# Patient Record
Sex: FEMALE | Race: WHITE | NOT HISPANIC OR LATINO | Employment: OTHER | URBAN - NONMETROPOLITAN AREA
[De-identification: names, ages, dates, MRNs, and addresses within clinical notes are randomized per-mention and may not be internally consistent; named-entity substitution may affect disease eponyms.]

---

## 2017-03-03 ENCOUNTER — OFFICE VISIT (OUTPATIENT)
Dept: MEDICAL GROUP | Facility: PHYSICIAN GROUP | Age: 67
End: 2017-03-03
Payer: MEDICARE

## 2017-03-03 VITALS
BODY MASS INDEX: 28 KG/M2 | OXYGEN SATURATION: 95 % | DIASTOLIC BLOOD PRESSURE: 84 MMHG | TEMPERATURE: 97.3 F | WEIGHT: 158 LBS | HEIGHT: 63 IN | SYSTOLIC BLOOD PRESSURE: 110 MMHG | RESPIRATION RATE: 16 BRPM | HEART RATE: 85 BPM

## 2017-03-03 DIAGNOSIS — R10.13 EPIGASTRIC PRESSURE: ICD-10-CM

## 2017-03-03 DIAGNOSIS — E55.9 VITAMIN D DEFICIENCY DISEASE: ICD-10-CM

## 2017-03-03 DIAGNOSIS — N18.30 CHRONIC KIDNEY DISEASE, STAGE 3 (HCC): ICD-10-CM

## 2017-03-03 PROCEDURE — G8420 CALC BMI NORM PARAMETERS: HCPCS | Performed by: NURSE PRACTITIONER

## 2017-03-03 PROCEDURE — 1101F PT FALLS ASSESS-DOCD LE1/YR: CPT | Performed by: NURSE PRACTITIONER

## 2017-03-03 PROCEDURE — 3014F SCREEN MAMMO DOC REV: CPT | Performed by: NURSE PRACTITIONER

## 2017-03-03 PROCEDURE — 99214 OFFICE O/P EST MOD 30 MIN: CPT | Performed by: NURSE PRACTITIONER

## 2017-03-03 PROCEDURE — G8432 DEP SCR NOT DOC, RNG: HCPCS | Performed by: NURSE PRACTITIONER

## 2017-03-03 PROCEDURE — 3017F COLORECTAL CA SCREEN DOC REV: CPT | Performed by: NURSE PRACTITIONER

## 2017-03-03 PROCEDURE — 4040F PNEUMOC VAC/ADMIN/RCVD: CPT | Performed by: NURSE PRACTITIONER

## 2017-03-03 PROCEDURE — G8482 FLU IMMUNIZE ORDER/ADMIN: HCPCS | Performed by: NURSE PRACTITIONER

## 2017-03-03 PROCEDURE — 1036F TOBACCO NON-USER: CPT | Performed by: NURSE PRACTITIONER

## 2017-03-03 ASSESSMENT — PAIN SCALES - GENERAL: PAINLEVEL: 2=MINIMAL-SLIGHT

## 2017-03-03 NOTE — PROGRESS NOTES
[facility]  Primary Care Office Visit - Problem-Oriented        History:     Hermelinda Mosley is a 66 y.o. female who is here today to discuss Shortness of Breath and GI Problem      Epigastric pressure  This is a new problem has been ongoing for many weeks. Patient reports epigastric discomfort and pressure. She does have a history of Nuñez's esophagus, she is on Nexium 20 mg daily. Her last EGD was in June 2016 which did not show any dysplasia. This discomfort and pressure is exacerbated by large meals and sitting after meals and occasionally with taking deep breathes. She denies any sour taste in her mouth, burning in her throat, nausea, vomiting, diarrhea, melena, syncope. She specifically states that this is not chest pain. She does go to exercise classes during the week and denies chest pain with exertion, she does get winded, she does high intensity interval training. She had been nervous about her decreased bone density at her last visited so had decided to decrease her use of her PPI. Unfortunately, I think that the above symptoms are stemming from uncontrolled Nuñez's esophagus/gastritis, recommend she increase Nexium to 20 mg twice daily or 2 tablets in the morning. We discussed EKG, patient declines today. She was instructed to follow-up with her gastroenterologist is symptoms persist.              Past Medical History   Diagnosis Date   • Hernia of unspecified site of abdominal cavity without mention of obstruction or gangrene repaired   • Vein, varicose stripped   • Screening mammogram 10/30/2008=normal   • Pap smear due   • History of colonoscopy 2005=normal     [facility]   • Unspecified hemorrhoids without mention of complication    • Degeneration of lumbar or lumbosacral intervertebral disc    • Abdominal pain, unspecified site    • Diarrhea    • Recurrent UTI 9/4/2009   • Swelling 9/4/2009   • Irritable bowel syndrome    • Esophageal reflux      Past Surgical History    Procedure Laterality Date   • Cholecystectomy     • Hernia repair     • Vein stripping       Social History     Social History   • Marital Status:      Spouse Name: N/A   • Number of Children: N/A   • Years of Education: N/A     Occupational History   • Not on file.     Social History Main Topics   • Smoking status: Former Smoker     Types: Cigarettes   • Smokeless tobacco: Not on file      Comment: only smokes 1-2 cigs a couple times a week for 1-2 years   • Alcohol Use: 0.0 oz/week     0 Standard drinks or equivalent per week      Comment: occasional   • Drug Use: No   • Sexual Activity: Not on file      Comment:      Other Topics Concern   • Not on file     Social History Narrative     History   Smoking status   • Former Smoker   • Types: Cigarettes   Smokeless tobacco   • Not on file     Comment: only smokes 1-2 cigs a couple times a week for 1-2 years     Family History   Problem Relation Age of Onset   • Hypertension Mother    • Diabetes Mother      pre diabetes   • Diabetes Father    • Hypertension Father      No Known Allergies    Problem List:     Patient Active Problem List    Diagnosis Date Noted   • Epigastric pressure 03/03/2017   • Nuñez's esophagus without dysplasia 10/17/2016   • Varicose veins of legs 10/17/2016   • Left ankle sprain 04/05/2016   • Primary osteoarthritis involving multiple joints 04/05/2016   • Chronic kidney disease, stage 3 04/05/2016   • Factor V Leiden mutation (CMS-[facility]) 04/05/2016   • Left ankle instability 01/05/2016   • Vitamin D deficiency disease 01/05/2016   • Cervical disc disease 01/05/2016   • Osteopenia 09/10/2013   • Swelling  ankles 09/04/2009   • Hemorrhoids 07/31/2009   • Esophageal reflux 07/31/2009   • Degeneration of lumbar or lumbosacral intervertebral disc 07/31/2009   • Irritable bowel syndrome 07/31/2009         Medications:     Current outpatient prescriptions:   •  esomeprazole (NEXIUM) 20 MG capsule, Take 1 Cap by mouth every morning  "before breakfast., Disp: 90 Each, Rfl: 3  •  omeprazole (PRILOSEC) 20 MG delayed-release capsule, Take 20 mg by mouth every day., Disp: , Rfl:   •  Diclofenac Sodium 1 % Gel, Apply 2gm in a thin layer to joint pain in hands up to 4 times per day if needed, Disp: 1 Tube, Rfl: 3  •  pneumococcal 13-Karina Conj Vacc (PREVNAR 13) syringe, 0.5 mL by Intramuscular route Once PRN for up to 1 dose., Disp: 0.5 Each, Rfl: 0  •  cyclobenzaprine (FLEXERIL) 5 MG tablet, Take 1-2 Tabs by mouth 3 times a day as needed for Mild Pain., Disp: 30 Tab, Rfl: 0  •  naproxen (NAPROSYN) 500 MG TABS, Take 1 Tab by mouth 2 times a day, with meals., Disp: 60 Tab, Rfl: 0  •  cyclobenzaprine (FLEXERIL) 10 MG TABS, Take 1 Tab by mouth at bedtime as needed for Mild Pain., Disp: 30 Tab, Rfl: 0  •  hydrocodone-acetaminophen (NORCO) 5-325 MG TABS per tablet, Take 1 Tab by mouth every 6 hours as needed., Disp: 20 Tab, Rfl: 0  •  hydrocortisone (ANUSOL-HC) 25 MG SUPP, Insert 1 Suppository in rectum every 12 hours., Disp: 60 Suppository, Rfl: 3  •  hydrocortisone rectal (PROCTOZONE HC) 2.5 % CREA, Apply to hemorrhoids twice per day, Disp: 30 g, Rfl: 0  •  Naproxen Sodium (ALEVE PO), Take  by mouth., Disp: , Rfl:   •  tramadol (ULTRAM) 50 MG TABS, Take 1 Tab by mouth every 8 hours as needed (moderate to severe pain)., Disp: 60 Tab, Rfl: 0  •  PROTONIX 40 MG TBEC, Take 40 mg by mouth 2 Times a Day., Disp: , Rfl:   •  ANUSOL-HC 25 MG SUPP, Insert 25 mg in rectum every 12 hours., Disp: , Rfl:       Review of Systems:     (positives in bold), all other systems reviewed and WNL     PULM: dyspnea, wheezing, cough, sputum production, hemoptysis  GI: nausea, vomiting, abdominal pain, greasy stools, blood in stool, diarrhea, constipation      Physical Assessment:     VS: /84 mmHg  Pulse 85  Temp(Src) 36.3 °C (97.3 °F)  Resp 16  Ht 1.588 m (5' 2.52\")  Wt 71.668 kg (158 lb)  BMI 28.42 kg/m2  SpO2 95%    General: Well-developed, well-nourished, female   "   Head: PERRL, EOMI. Normocephalic. No facial asymmetry noted.  Cardiovasc:No JVD.  RRR, no MRG. No thrills or bruits. Pulses 2+ and symmetric at all distal extremities.  Pulmonary: Lungs clear bilaterally.  Normal respiratory effort. No wheeze or crackles.   Abdomen: Abdomen soft, NT, ND, NAB. Patient is able to point to the area of discomfort which is inferior to the lower sternal border.  No masses or organomegaly.  Neuro: Alert and oriented  Skin:No rashes noted. Skin warm, dry, intact     Psych: Dressed appropriately for the weather, pleasant and conversant.  Affect, mood & judgment appropriate.    Assessment/Plan:   Hermelinda was seen today for shortness of breath and gi problem.    Diagnoses and all orders for this visit:    Epigastric pressure, new, stable   - Clinically stable, reassurance provided, I do think that her symptoms are related to Nuñez's esophagus and uncontrolled gastritis, recommend she increase her Nexium to 40 mg daily. Recommend small frequent meals. Given patient's concern for vitamin D deficiency and chronic kidney disease related to chronic use of PPIs we will repeat her labs in 2-3 weeks. We discussed EKG, patient declines. She is to follow-up with her gastroenterologist if symptoms persist despite increasing dose of Nexium. ER precautions discussed.    Vitamin D deficiency disease, chronic, unclear control   -     VITAMIN D,25 HYDROXY; Future    Chronic kidney disease, stage 3, chronic, stable   -     COMP METABOLIC PANEL; Future      Patient is agreeable to the above plan and voiced understanding. All questions answered.     Please note that this dictation was created using voice recognition software. I have made every reasonable attempt to correct obvious errors, but I expect that there are errors of grammar and possibly content that I did not discover before finalizing the note.      NEGRITO Alvarez  3/3/2017, 9:26 AM

## 2017-03-03 NOTE — ASSESSMENT & PLAN NOTE
This is a new problem has been ongoing for many weeks. Patient reports epigastric discomfort and pressure. She does have a history of Nuñez's esophagus, she is on Nexium 20 mg daily. Her last EGD was in June 2016 which did not show any dysplasia. This discomfort and pressure is exacerbated by large meals and sitting after meals and occasionally with taking deep breathes. She denies any sour taste in her mouth, burning in her throat, nausea, vomiting, diarrhea, melena, syncope. She specifically states that this is not chest pain. She does go to exercise classes during the week and denies chest pain with exertion, she does get winded, she does high intensity interval training. She had been nervous about her decreased bone density at her last visited so had decided to decrease her use of her PPI. Unfortunately, I think that the above symptoms are stemming from uncontrolled Nuñez's esophagus/gastritis, recommend she increase Nexium to 20 mg twice daily or 2 tablets in the morning. We discussed EKG, patient declines today. She was instructed to follow-up with her gastroenterologist is symptoms persist.

## 2017-03-24 ENCOUNTER — HOSPITAL ENCOUNTER (OUTPATIENT)
Dept: LAB | Facility: MEDICAL CENTER | Age: 67
End: 2017-03-24
Attending: NURSE PRACTITIONER
Payer: MEDICARE

## 2017-03-24 DIAGNOSIS — E55.9 VITAMIN D DEFICIENCY DISEASE: ICD-10-CM

## 2017-03-24 DIAGNOSIS — N18.30 CHRONIC KIDNEY DISEASE, STAGE 3 (HCC): ICD-10-CM

## 2017-03-24 LAB
25(OH)D3 SERPL-MCNC: 37 NG/ML (ref 30–100)
ALBUMIN SERPL BCP-MCNC: 4.5 G/DL (ref 3.2–4.9)
ALBUMIN/GLOB SERPL: 1.8 G/DL
ALP SERPL-CCNC: 66 U/L (ref 30–99)
ALT SERPL-CCNC: 16 U/L (ref 2–50)
ANION GAP SERPL CALC-SCNC: 5 MMOL/L (ref 0–11.9)
AST SERPL-CCNC: 19 U/L (ref 12–45)
BILIRUB SERPL-MCNC: 0.6 MG/DL (ref 0.1–1.5)
BUN SERPL-MCNC: 14 MG/DL (ref 8–22)
CALCIUM SERPL-MCNC: 9.8 MG/DL (ref 8.5–10.5)
CHLORIDE SERPL-SCNC: 105 MMOL/L (ref 96–112)
CO2 SERPL-SCNC: 29 MMOL/L (ref 20–33)
CREAT SERPL-MCNC: 1.04 MG/DL (ref 0.5–1.4)
GLOBULIN SER CALC-MCNC: 2.5 G/DL (ref 1.9–3.5)
GLUCOSE SERPL-MCNC: 77 MG/DL (ref 65–99)
POTASSIUM SERPL-SCNC: 4.4 MMOL/L (ref 3.6–5.5)
PROT SERPL-MCNC: 7 G/DL (ref 6–8.2)
SODIUM SERPL-SCNC: 139 MMOL/L (ref 135–145)

## 2017-03-24 PROCEDURE — 36415 COLL VENOUS BLD VENIPUNCTURE: CPT

## 2017-03-24 PROCEDURE — 82306 VITAMIN D 25 HYDROXY: CPT

## 2017-03-24 PROCEDURE — 80053 COMPREHEN METABOLIC PANEL: CPT

## 2017-05-15 ENCOUNTER — APPOINTMENT (OUTPATIENT)
Dept: RADIOLOGY | Facility: IMAGING CENTER | Age: 67
End: 2017-05-15
Attending: PHYSICIAN ASSISTANT
Payer: MEDICARE

## 2017-05-15 ENCOUNTER — OFFICE VISIT (OUTPATIENT)
Dept: URGENT CARE | Facility: PHYSICIAN GROUP | Age: 67
End: 2017-05-15
Payer: MEDICARE

## 2017-05-15 VITALS
SYSTOLIC BLOOD PRESSURE: 130 MMHG | HEART RATE: 68 BPM | WEIGHT: 160 LBS | HEIGHT: 63 IN | DIASTOLIC BLOOD PRESSURE: 82 MMHG | RESPIRATION RATE: 18 BRPM | TEMPERATURE: 98.3 F | BODY MASS INDEX: 28.35 KG/M2 | OXYGEN SATURATION: 96 %

## 2017-05-15 DIAGNOSIS — M19.042 OSTEOARTHRITIS OF FINGER, LEFT: ICD-10-CM

## 2017-05-15 DIAGNOSIS — M79.645 FINGER PAIN, LEFT: ICD-10-CM

## 2017-05-15 PROCEDURE — 4040F PNEUMOC VAC/ADMIN/RCVD: CPT | Performed by: PHYSICIAN ASSISTANT

## 2017-05-15 PROCEDURE — 1036F TOBACCO NON-USER: CPT | Performed by: PHYSICIAN ASSISTANT

## 2017-05-15 PROCEDURE — G8432 DEP SCR NOT DOC, RNG: HCPCS | Performed by: PHYSICIAN ASSISTANT

## 2017-05-15 PROCEDURE — 1101F PT FALLS ASSESS-DOCD LE1/YR: CPT | Performed by: PHYSICIAN ASSISTANT

## 2017-05-15 PROCEDURE — 99214 OFFICE O/P EST MOD 30 MIN: CPT | Performed by: PHYSICIAN ASSISTANT

## 2017-05-15 PROCEDURE — 3014F SCREEN MAMMO DOC REV: CPT | Performed by: PHYSICIAN ASSISTANT

## 2017-05-15 PROCEDURE — 73130 X-RAY EXAM OF HAND: CPT | Mod: TC,LT | Performed by: PHYSICIAN ASSISTANT

## 2017-05-15 PROCEDURE — G8419 CALC BMI OUT NRM PARAM NOF/U: HCPCS | Performed by: PHYSICIAN ASSISTANT

## 2017-05-15 RX ORDER — MELOXICAM 15 MG/1
15 TABLET ORAL DAILY
Qty: 30 TAB | Refills: 0 | Status: SHIPPED | OUTPATIENT
Start: 2017-05-15 | End: 2018-08-23

## 2017-05-15 NOTE — PROGRESS NOTES
Chief Complaint   Patient presents with   • Finger Pain     4th digit on right hand , swelling, pain  x 6 days       HISTORY OF PRESENT ILLNESS: Patient is a 66 y.o. female who presents today because she has pain and swelling of her left fourth digit. This is been going on for about 45 days. Denies any trauma to the area but was pulling some weeds. Denies any significant redness, the pain is in the joint area. She denies any distal paresthesias. She has been soaking it in hot water and it has not been helping.    Patient Active Problem List    Diagnosis Date Noted   • Epigastric pressure 03/03/2017   • Nuñez's esophagus without dysplasia 10/17/2016   • Varicose veins of legs 10/17/2016   • Left ankle sprain 04/05/2016   • Primary osteoarthritis involving multiple joints 04/05/2016   • Chronic kidney disease, stage 3 04/05/2016   • Factor V Leiden mutation (CMS-[facility]) 04/05/2016   • Left ankle instability 01/05/2016   • Vitamin D deficiency disease 01/05/2016   • Cervical disc disease 01/05/2016   • Osteopenia 09/10/2013   • Swelling  ankles 09/04/2009   • Hemorrhoids 07/31/2009   • Esophageal reflux 07/31/2009   • Degeneration of lumbar or lumbosacral intervertebral disc 07/31/2009   • Irritable bowel syndrome 07/31/2009       Allergies:Review of patient's allergies indicates no known allergies.    Current Outpatient Prescriptions Ordered in [facility]   Medication Sig Dispense Refill   • meloxicam (MOBIC) 15 MG tablet Take 1 Tab by mouth every day. 30 Tab 0   • omeprazole (PRILOSEC) 20 MG delayed-release capsule Take 20 mg by mouth every day.     • Diclofenac Sodium 1 % Gel Apply 2gm in a thin layer to joint pain in hands up to 4 times per day if needed 1 Tube 3   • pneumococcal 13-Karina Conj Vacc (PREVNAR 13) syringe 0.5 mL by Intramuscular route Once PRN for up to 1 dose. 0.5 Each 0   • cyclobenzaprine (FLEXERIL) 5 MG tablet Take 1-2 Tabs by mouth 3 times a day as needed for Mild Pain. 30 Tab 0   • naproxen (NAPROSYN)  500 MG TABS Take 1 Tab by mouth 2 times a day, with meals. 60 Tab 0   • cyclobenzaprine (FLEXERIL) 10 MG TABS Take 1 Tab by mouth at bedtime as needed for Mild Pain. 30 Tab 0   • hydrocodone-acetaminophen (NORCO) 5-325 MG TABS per tablet Take 1 Tab by mouth every 6 hours as needed. 20 Tab 0   • hydrocortisone (ANUSOL-HC) 25 MG SUPP Insert 1 Suppository in rectum every 12 hours. 60 Suppository 3   • hydrocortisone rectal (PROCTOZONE HC) 2.5 % CREA Apply to hemorrhoids twice per day 30 g 0   • esomeprazole (NEXIUM) 20 MG capsule Take 1 Cap by mouth every morning before breakfast. 90 Each 3   • Naproxen Sodium (ALEVE PO) Take  by mouth.     • tramadol (ULTRAM) 50 MG TABS Take 1 Tab by mouth every 8 hours as needed (moderate to severe pain). 60 Tab 0   • PROTONIX 40 MG TBEC Take 40 mg by mouth 2 Times a Day.     • ANUSOL-HC 25 MG SUPP Insert 25 mg in rectum every 12 hours.       No current [facility]-ordered facility-administered medications on file.       Past Medical History   Diagnosis Date   • Hernia of unspecified site of abdominal cavity without mention of obstruction or gangrene repaired   • Vein, varicose stripped   • Screening mammogram 10/30/2008=normal   • Pap smear due   • History of colonoscopy 2005=normal     Dr. Benjamin> [facility]   • Unspecified hemorrhoids without mention of complication    • Degeneration of lumbar or lumbosacral intervertebral disc    • Abdominal pain, unspecified site    • Diarrhea    • Recurrent UTI 9/4/2009   • Swelling 9/4/2009   • Irritable bowel syndrome    • Esophageal reflux        Social History   Substance Use Topics   • Smoking status: Former Smoker     Types: Cigarettes   • Smokeless tobacco: Never Used      Comment: only smokes 1-2 cigs a couple times a week for 1-2 years   • Alcohol Use: 0.0 oz/week     0 Standard drinks or equivalent per week      Comment: occasional       Family Status   Relation Status Death Age   • Mother Alive      is essentially healthy in her  "90's   • Father       kidney failure   • Brother Alive      polycythemia     Family History   Problem Relation Age of Onset   • Hypertension Mother    • Diabetes Mother      pre diabetes   • Diabetes Father    • Hypertension Father        ROS:  Review of Systems   Constitutional: Negative for fever, chills, weight loss and malaise/fatigue.   HENT: Negative for ear pain, nosebleeds, congestion, sore throat and neck pain.    Eyes: Negative for blurred vision.   Respiratory: Negative for cough, sputum production, shortness of breath and wheezing.    Cardiovascular: Negative for chest pain, palpitations, orthopnea and leg swelling.   Gastrointestinal: Negative for heartburn, nausea, vomiting and abdominal pain.       Exam:  Blood pressure 130/82, pulse 68, temperature 36.8 °C (98.3 °F), resp. rate 18, height 1.6 m (5' 2.99\"), weight 72.576 kg (160 lb), SpO2 96 %.  General:  Well nourished, well developed female in NAD  Head:Normocephalic, atraumatic  Eyes: PERRLA, EOM within normal limits, no conjunctival injection, no scleral icterus, visual fields and acuity grossly intact.  Extremities: no clubbing, cyanosis, or edema. She has nonerythematousTender swelling around her fourth digit PIP joint, with reduced range of motion distally, good distal circulation and sensation.    Hand x-ray by radiology interpretation:  No radiographic evidence of acute traumatic bone injury. Degenerative type arthritic changes involving the first carpal-metacarpal joint, distal interphalangeal joint of the index finger, and interphalangeal joint of the thumb.    Please note that this dictation was created using voice recognition software. I have made every reasonable attempt to correct obvious errors, but I expect that there are errors of grammar and possibly content that I did not discover before finalizing the note.    Assessment/Plan:  1. Finger pain, left  DX-HAND 3+ LEFT   2. Osteoarthritis of finger, left  meloxicam (MOBIC) 15 " MG tablet       Followup with primary care in the next 7-10 days if not significantly improving, return to the urgent care or go to the emergency room sooner for any worsening of symptoms.

## 2017-05-15 NOTE — MR AVS SNAPSHOT
"        Hermelinda Mosley   5/15/2017 1:20 PM   Office Visit   MRN: [id number]    Department:  [facility]   Dept Phone:  [phone]    Description:  Female : 1950   Provider:  Santosh Link PA-C           Reason for Visit     Finger Pain 4th digit on right hand , swelling, pain  x 6 days      Allergies as of 5/15/2017     No Known Allergies      You were diagnosed with     Finger pain, left   [369866]       Osteoarthritis of finger, left   [[id number]]         Vital Signs     Blood Pressure Pulse Temperature Respirations Height Weight    130/82 mmHg 68 36.8 °C (98.3 °F) 18 1.6 m (5' 2.99\") 72.576 kg (160 lb)    Body Mass Index Oxygen Saturation Smoking Status             28.35 kg/m2 96% Former Smoker         Basic Information     Date Of Birth Sex Race Ethnicity Preferred Language    1950 Female White Non- English      Your appointments     May 24, 2017  8:50 AM   MA [id number] with RB MG 1   [facility] ([address])    [address] [place] [id number] [phone]           No deodorant, powder, perfume or lotion under the arm or breast area.            May 25, 2017  8:20 AM   Established Patient with SADIE Claudio   [facility]    [address] [place] [id number] [phone]           You will be receiving a confirmation call a few days before your appointment from our automated call confirmation system.              Problem List              ICD-10-CM Priority Class Noted - Resolved    Hemorrhoids K64.9   2009 - Present    Esophageal reflux K21.9   2009 - Present    Degeneration of lumbar or lumbosacral intervertebral disc M51.37   2009 - Present    Irritable bowel syndrome K58.9   2009 - Present    Swelling  ankles R60.9   2009 - Present    Osteopenia M85.80   9/10/2013 - Present    Left ankle instability M25.372   2016 - Present    Vitamin D deficiency disease E55.9   " 1/5/2016 - Present    Cervical disc disease M50.90   1/5/2016 - Present    Left ankle sprain S93.402A   4/5/2016 - Present    Primary osteoarthritis involving multiple joints M15.0   4/5/2016 - Present    Chronic kidney disease, stage 3 N18.3   4/5/2016 - Present    Factor V Leiden mutation (CMS-[facility]) D68.51   4/5/2016 - Present    Nuñez's esophagus without dysplasia K22.70   10/17/2016 - Present    Varicose veins of legs I83.93   10/17/2016 - Present    Epigastric pressure R10.13   3/3/2017 - Present      Health Maintenance        Date Due Completion Dates    IMM DTaP/Tdap/Td Vaccine (1 - Tdap) 6/16/1969 ---    IMM ZOSTER VACCINE 6/16/2010 ---    IMM PNEUMOCOCCAL 65+ (ADULT) LOW/MEDIUM RISK SERIES (2 of 2 - PPSV23) 1/6/2017 1/6/2016    MAMMOGRAM 1/29/2017 1/29/2016, 2/11/2013 (Done), 12/1/2009, 12/1/2009, 10/30/2008, 10/30/2008    Override on 2/11/2013: Done (Dr. Hansen; [place])    PAP SMEAR 1/6/2018 1/6/2015 (Done)    Override on 1/6/2015: Done    BONE DENSITY 1/29/2021 1/29/2016, 6/12/2013    COLONOSCOPY 6/14/2026 6/14/2016            Current Immunizations     13-VALENT PCV PREVNAR 1/6/2016    Influenza TIV (IM) 9/17/2016      Below and/or attached are the medications your provider expects you to take. Review all of your home medications and newly ordered medications with your provider and/or pharmacist. Follow medication instructions as directed by your provider and/or pharmacist. Please keep your medication list with you and share with your provider. Update the information when medications are discontinued, doses are changed, or new medications (including over-the-counter products) are added; and carry medication information at all times in the event of emergency situations     Allergies:  No Known Allergies          Medications  Valid as of: May 15, 2017 -  2:55 PM    Generic Name Brand Name Tablet Size Instructions for use    Cyclobenzaprine HCl (Tab) FLEXERIL 10 MG Take 1 Tab by mouth at bedtime as  needed for Mild Pain.        Cyclobenzaprine HCl (Tab) FLEXERIL 5 MG Take 1-2 Tabs by mouth 3 times a day as needed for Mild Pain.        Diclofenac Sodium (Gel) Diclofenac Sodium 1 % Apply 2gm in a thin layer to joint pain in hands up to 4 times per day if needed        Esomeprazole Magnesium (CAPSULE DELAYED RELEASE) NEXIUM 20 MG Take 1 Cap by mouth every morning before breakfast.        Hydrocodone-Acetaminophen (Tab) NORCO 5-325 MG Take 1 Tab by mouth every 6 hours as needed.        Hydrocortisone (Cream) PROCTOZONE HC 2.5 % Apply to hemorrhoids twice per day        Hydrocortisone Acetate (Suppos) ANUSOL-HC 25 MG Insert 25 mg in rectum every 12 hours.        Hydrocortisone Acetate (Suppos) ANUSOL-HC 25 MG Insert 1 Suppository in rectum every 12 hours.        Meloxicam (Tab) MOBIC 15 MG Take 1 Tab by mouth every day.        Naproxen (Tab) NAPROSYN 500 MG Take 1 Tab by mouth 2 times a day, with meals.        Naproxen Sodium   Take  by mouth.        Omeprazole (CAPSULE DELAYED RELEASE) PRILOSEC 20 MG Take 20 mg by mouth every day.        Pantoprazole Sodium (Tablet Delayed Response) PROTONIX 40 MG Take 40 mg by mouth 2 Times a Day.        Pneumococcal 13-Karina Conj Vacc (Suspension) PREVNAR 13  0.5 mL by Intramuscular route Once PRN for up to 1 dose.        TraMADol HCl (Tab) ULTRAM 50 MG Take 1 Tab by mouth every 8 hours as needed (moderate to severe pain).        .                 Medicines prescribed today were sent to:     [facility] [address] [place] [id number]    Phone: [phone] Fax: [phone]    Open 24 Hours?: No      Medication refill instructions:       If your prescription bottle indicates you have medication refills left, it is not necessary to call your provider’s office. Please contact your pharmacy and they will refill your medication.    If your prescription bottle indicates you do not have any refills left, you may request  refills at any time through one of the following ways: The online [employer] system (except Urgent Care), by calling your provider’s office, or by asking your pharmacy to contact your provider’s office with a refill request. Medication refills are processed only during regular business hours and may not be available until the next business day. Your provider may request additional information or to have a follow-up visit with you prior to refilling your medication.   *Please Note: Medication refills are assigned a new Rx number when refilled electronically. Your pharmacy may indicate that no refills were authorized even though a new prescription for the same medication is available at the pharmacy. Please request the medicine by name with the pharmacy before contacting your provider for a refill.        Your To Do List     Future Labs/Procedures Complete By Expires    DX-HAND 3+ LEFT  As directed 5/15/2018      Other Notes About Your Plan     GYN-Dr Jade Michaels Status: Patient Declined

## 2017-05-24 ENCOUNTER — APPOINTMENT (OUTPATIENT)
Dept: RADIOLOGY | Facility: MEDICAL CENTER | Age: 67
End: 2017-05-24
Attending: NURSE PRACTITIONER
Payer: MEDICARE

## 2017-05-25 ENCOUNTER — APPOINTMENT (OUTPATIENT)
Dept: RADIOLOGY | Facility: IMAGING CENTER | Age: 67
End: 2017-05-25
Attending: NURSE PRACTITIONER
Payer: MEDICARE

## 2017-05-25 ENCOUNTER — OFFICE VISIT (OUTPATIENT)
Dept: MEDICAL GROUP | Facility: PHYSICIAN GROUP | Age: 67
End: 2017-05-25
Payer: MEDICARE

## 2017-05-25 VITALS
SYSTOLIC BLOOD PRESSURE: 124 MMHG | BODY MASS INDEX: 29.44 KG/M2 | HEIGHT: 62 IN | HEART RATE: 80 BPM | DIASTOLIC BLOOD PRESSURE: 70 MMHG | WEIGHT: 160 LBS | OXYGEN SATURATION: 96 % | RESPIRATION RATE: 20 BRPM | TEMPERATURE: 97.7 F

## 2017-05-25 DIAGNOSIS — R06.02 SOB (SHORTNESS OF BREATH): ICD-10-CM

## 2017-05-25 DIAGNOSIS — R07.9 CHEST PAIN, UNSPECIFIED TYPE: ICD-10-CM

## 2017-05-25 DIAGNOSIS — R10.13 EPIGASTRIC PRESSURE: Primary | ICD-10-CM

## 2017-05-25 DIAGNOSIS — N18.30 CHRONIC KIDNEY DISEASE, STAGE 3 (HCC): ICD-10-CM

## 2017-05-25 PROCEDURE — 4040F PNEUMOC VAC/ADMIN/RCVD: CPT | Performed by: NURSE PRACTITIONER

## 2017-05-25 PROCEDURE — 3014F SCREEN MAMMO DOC REV: CPT | Performed by: NURSE PRACTITIONER

## 2017-05-25 PROCEDURE — G8432 DEP SCR NOT DOC, RNG: HCPCS | Performed by: NURSE PRACTITIONER

## 2017-05-25 PROCEDURE — 99214 OFFICE O/P EST MOD 30 MIN: CPT | Performed by: NURSE PRACTITIONER

## 2017-05-25 PROCEDURE — 1101F PT FALLS ASSESS-DOCD LE1/YR: CPT | Performed by: NURSE PRACTITIONER

## 2017-05-25 PROCEDURE — 71020 DX-CHEST-2 VIEWS: CPT | Mod: TC | Performed by: NURSE PRACTITIONER

## 2017-05-25 PROCEDURE — 1036F TOBACCO NON-USER: CPT | Performed by: NURSE PRACTITIONER

## 2017-05-25 PROCEDURE — G8419 CALC BMI OUT NRM PARAM NOF/U: HCPCS | Performed by: NURSE PRACTITIONER

## 2017-05-25 NOTE — ASSESSMENT & PLAN NOTE
"This is a 66-year-old female who is here for follow-up of ongoing epigastric pressure. These episodes have been intermittent and ongoing for nearly a month. She has a history of Nuñez's esophagus and hiatal hernia, she continues on Nexium 40 mg daily which has not relieved her symptoms. She has episodes of pressure and discomfort that happen with both rest and physical activity, occasionally with eating large meals. She does have worsening reflux with dairy products and spacing meals, she tells me that these episodes are distinctly different from her reflux symptoms. She describes these episodes as \"feeling like someone is sitting on my chest\" and \"feeling somewhat labored to breathe.\" She adamantly declines chest pain, diaphoresis, nausea/vomiting with physical exertion. She does do high intensity interval training and only occasionally is able to reproduce these episodes. The episodes relief on her own.     Denies \"crushing\" chest pain, LUZ, numbness/tingling of arm or jaw, N/V, diaphoresis, palpitations, syncope.    Does not smoke, use illicit drugs, does not report routine NSAIDs    Denies history of asthma    She does have a follow-up appointment with her gastroenterologist next week. She is quite anxious about these episodes and we did discuss a referral to cardiology, patient would like to move forward with this. I have got a baseline EKG which does show borderline ST elevation, otherwise snius rhythm, she is not actively having any chest pain symptoms, this was reviewed with Dr. Long. We will also obtain CXR.   "

## 2017-05-26 NOTE — PROGRESS NOTES
"[facility]  Primary Care Office Visit - Problem-Oriented        History:     Hermelinda Mosley is a 66 y.o. female who is here today to discuss Follow-Up      Epigastric pressure  This is a 66-year-old female who is here for follow-up of ongoing epigastric pressure. These episodes have been intermittent and ongoing for nearly a month. She has a history of Nuñez's esophagus and hiatal hernia, she continues on Nexium 40 mg daily which has not relieved her symptoms. She has episodes of pressure and discomfort that happen with both rest and physical activity, occasionally with eating large meals. She does have worsening reflux with dairy products and spacing meals, she tells me that these episodes are distinctly different from her reflux symptoms. She describes these episodes as \"feeling like someone is sitting on my chest\" and \"feeling somewhat labored to breathe.\" She adamantly declines chest pain, diaphoresis, nausea/vomiting with physical exertion. She does do high intensity interval training and only occasionally is able to reproduce these episodes. The episodes relief on her own.     Denies \"crushing\" chest pain, LUZ, numbness/tingling of arm or jaw, N/V, diaphoresis, palpitations, syncope.    Does not smoke, use illicit drugs, does not report routine NSAIDs    Denies history of asthma    She does have a follow-up appointment with her gastroenterologist next week. She is quite anxious about these episodes and we did discuss a referral to cardiology, patient would like to move forward with this. I have got a baseline EKG which does show borderline ST elevation, otherwise snius rhythm, she is not actively having any chest pain symptoms, this was reviewed with Dr. Long. We will also obtain CXR.           Past Medical History   Diagnosis Date   • Hernia of unspecified site of abdominal cavity without mention of obstruction or gangrene repaired   • Vein, varicose stripped   • Screening mammogram " 10/30/2008=normal   • Pap smear due   • History of colonoscopy 2005=normal     Dr. Benjamin> [facility]   • Unspecified hemorrhoids without mention of complication    • Degeneration of lumbar or lumbosacral intervertebral disc    • Abdominal pain, unspecified site    • Diarrhea    • Recurrent UTI 9/4/2009   • Swelling 9/4/2009   • Irritable bowel syndrome    • Esophageal reflux      Past Surgical History   Procedure Laterality Date   • Cholecystectomy     • Hernia repair     • Vein stripping       Social History     Social History   • Marital Status:      Spouse Name: N/A   • Number of Children: N/A   • Years of Education: N/A     Occupational History   • Not on file.     Social History Main Topics   • Smoking status: Former Smoker     Types: Cigarettes   • Smokeless tobacco: Never Used      Comment: only smokes 1-2 cigs a couple times a week for 1-2 years   • Alcohol Use: 0.0 oz/week     0 Standard drinks or equivalent per week      Comment: occasional   • Drug Use: No   • Sexual Activity: Not on file      Comment:      Other Topics Concern   • Not on file     Social History Narrative     History   Smoking status   • Former Smoker   • Types: Cigarettes   Smokeless tobacco   • Never Used     Comment: only smokes 1-2 cigs a couple times a week for 1-2 years     Family History   Problem Relation Age of Onset   • Hypertension Mother    • Diabetes Mother      pre diabetes   • Diabetes Father    • Hypertension Father      No Known Allergies    Problem List:     Patient Active Problem List    Diagnosis Date Noted   • Epigastric pressure 03/03/2017   • Nuñez's esophagus without dysplasia 10/17/2016   • Varicose veins of legs 10/17/2016   • Left ankle sprain 04/05/2016   • Primary osteoarthritis involving multiple joints 04/05/2016   • Chronic kidney disease, stage 3 04/05/2016   • Factor V Leiden mutation (CMS-[facility]) 04/05/2016   • Left ankle instability 01/05/2016   • Vitamin D deficiency disease  01/05/2016   • Cervical disc disease 01/05/2016   • Osteopenia 09/10/2013   • Swelling  ankles 09/04/2009   • Hemorrhoids 07/31/2009   • Esophageal reflux 07/31/2009   • Degeneration of lumbar or lumbosacral intervertebral disc 07/31/2009   • Irritable bowel syndrome 07/31/2009         Medications:     Current outpatient prescriptions:   •  Multiple Vitamins-Minerals (MULTIVITAMIN PO), Take  by mouth., Disp: , Rfl:   •  Cholecalciferol (VITAMIN D PO), Take  by mouth., Disp: , Rfl:   •  Probiotic Product (PROBIOTIC DAILY PO), Take  by mouth., Disp: , Rfl:   •  Ascorbic Acid (VITAMIN C PO), Take  by mouth., Disp: , Rfl:   •  meloxicam (MOBIC) 15 MG tablet, Take 1 Tab by mouth every day., Disp: 30 Tab, Rfl: 0  •  omeprazole (PRILOSEC) 20 MG delayed-release capsule, Take 20 mg by mouth every day., Disp: , Rfl:   •  Naproxen Sodium (ALEVE PO), Take  by mouth., Disp: , Rfl:   •  Diclofenac Sodium 1 % Gel, Apply 2gm in a thin layer to joint pain in hands up to 4 times per day if needed, Disp: 1 Tube, Rfl: 3  •  pneumococcal 13-Karina Conj Vacc (PREVNAR 13) syringe, 0.5 mL by Intramuscular route Once PRN for up to 1 dose., Disp: 0.5 Each, Rfl: 0  •  cyclobenzaprine (FLEXERIL) 5 MG tablet, Take 1-2 Tabs by mouth 3 times a day as needed for Mild Pain., Disp: 30 Tab, Rfl: 0  •  naproxen (NAPROSYN) 500 MG TABS, Take 1 Tab by mouth 2 times a day, with meals., Disp: 60 Tab, Rfl: 0  •  cyclobenzaprine (FLEXERIL) 10 MG TABS, Take 1 Tab by mouth at bedtime as needed for Mild Pain., Disp: 30 Tab, Rfl: 0  •  hydrocodone-acetaminophen (NORCO) 5-325 MG TABS per tablet, Take 1 Tab by mouth every 6 hours as needed., Disp: 20 Tab, Rfl: 0  •  hydrocortisone (ANUSOL-HC) 25 MG SUPP, Insert 1 Suppository in rectum every 12 hours., Disp: 60 Suppository, Rfl: 3  •  hydrocortisone rectal (PROCTOZONE HC) 2.5 % CREA, Apply to hemorrhoids twice per day, Disp: 30 g, Rfl: 0  •  esomeprazole (NEXIUM) 20 MG capsule, Take 1 Cap by mouth every morning before  "breakfast., Disp: 90 Each, Rfl: 3  •  tramadol (ULTRAM) 50 MG TABS, Take 1 Tab by mouth every 8 hours as needed (moderate to severe pain)., Disp: 60 Tab, Rfl: 0  •  PROTONIX 40 MG TBEC, Take 40 mg by mouth 2 Times a Day., Disp: , Rfl:   •  ANUSOL-HC 25 MG SUPP, Insert 25 mg in rectum every 12 hours., Disp: , Rfl:       Review of Systems:     Positives per HPI all other systems reviewed and WNL       Physical Assessment:     VS: /70 mmHg  Pulse 80  Temp(Src) 36.5 °C (97.7 °F)  Resp 20  Ht 1.575 m (5' 2.01\")  Wt 72.576 kg (160 lb)  BMI 29.26 kg/m2  SpO2 96%    General: Well-developed, well-nourished female    Head: PERRL, EOMI. Normocephalic. No facial asymmetry noted.  Cardiovasc:No JVD.  RRR, no MRG. No thrills or bruits. Pulses 2+ and symmetric at all distal extremities. No reproducible symptoms with A/P chest pressure.   Pulmonary: Lungs clear bilaterally.  Normal respiratory effort. No wheeze or crackles.   Extremities: No edema, no TTP bilateral calves. Pedal pulses intact. No joint effusions. LEs warm and well-perfused.  Neuro: Alert and oriented, CNs II-XII intact, no focal deficits.  Skin:No rashes noted. Skin warm, dry, intact    Psych: Dressed appropriately for the weather, pleasant and conversant.  Affect, mood & judgment appropriate.    LABS: Results reviewed and discussed with the patient, all questions answered.        Assessment/Plan:   Hermelinda was seen today for follow-up.    Diagnoses and all orders for this visit:    Epigastric pressure, ongoing, stable   - clinically stable and asymptomatic in the office today, EKG obtained as baseline for referral to cardiology at patient's request. CXR ordered. She needs to keep her appointment with gastro next week for evaluation and possible EGD. Continue omeprazole 40mg daily. ER precautions discussed. Follow up in 1 month, sooner if needed     SOB (shortness of breath), ongoing, intermittent, stable   - treatment plan as above   -     DX-CHEST-2 " VIEWS; Future  -     REFERRAL TO CARDIOLOGY    Chest pain, unspecified type, ongoing, intermittent, stable   - treatment as above   -     REFERRAL TO CARDIOLOGY      Chronic kidney disease, stage 3, stable   - reviewed labs with patient, push fluids, continue to monitor     Patient is agreeable to the above plan and voiced understanding. All questions answered.     Please note that this dictation was created using voice recognition software. I have made every reasonable attempt to correct obvious errors, but I expect that there are errors of grammar and possibly content that I did not discover before finalizing the note.      NEGRITO Alvarez  5/25/2017, 5:01 PM

## 2017-05-30 ENCOUNTER — TELEPHONE (OUTPATIENT)
Dept: MEDICAL GROUP | Facility: PHYSICIAN GROUP | Age: 67
End: 2017-05-30

## 2017-05-30 NOTE — TELEPHONE ENCOUNTER
Received a call from the gastroenterologist today stating that  patient should be seen by cardiology first before GI. I see that cardiology clinic tried to schedule an appointment with her twice. Please let patient know she needs to call cardiology clinic back so that she can schedule an appointment with them.  Maribel Long M.D.

## 2017-05-31 ENCOUNTER — PATIENT OUTREACH (OUTPATIENT)
Dept: HEALTH INFORMATION MANAGEMENT | Facility: OTHER | Age: 67
End: 2017-05-31

## 2017-05-31 NOTE — PROGRESS NOTES
Attempt #:1    WebIZ Checked & Epic Updated: yes  HealthConnect Verified: no  Verify PCP: yes    Communication Preference Obtained: yes     Review Care Team: yes    Annual Wellness Visit Scheduling  1. Scheduling Status:Scheduled     Care Gap Scheduling (Attempt to Schedule EACH Overdue Care Gap!)     Health Maintenance Due   Topic Date Due   • Annual Wellness Visit  Scheduled    • MAMMOGRAM  Scheduled         Devotee Activation: already active  Devotee Sheba: no  Virtual Visits: no  Opt In to Text Messages: no

## 2017-06-01 ENCOUNTER — HOSPITAL ENCOUNTER (OUTPATIENT)
Dept: RADIOLOGY | Facility: MEDICAL CENTER | Age: 67
End: 2017-06-01
Attending: NURSE PRACTITIONER
Payer: MEDICARE

## 2017-06-01 DIAGNOSIS — Z13.9 SCREENING: ICD-10-CM

## 2017-06-01 PROCEDURE — 77063 BREAST TOMOSYNTHESIS BI: CPT

## 2017-06-26 ENCOUNTER — OFFICE VISIT (OUTPATIENT)
Dept: MEDICAL GROUP | Facility: PHYSICIAN GROUP | Age: 67
End: 2017-06-26
Payer: MEDICARE

## 2017-06-26 VITALS
TEMPERATURE: 99 F | DIASTOLIC BLOOD PRESSURE: 86 MMHG | BODY MASS INDEX: 29.44 KG/M2 | HEART RATE: 65 BPM | HEIGHT: 62 IN | SYSTOLIC BLOOD PRESSURE: 130 MMHG | OXYGEN SATURATION: 97 % | WEIGHT: 160 LBS

## 2017-06-26 DIAGNOSIS — M51.37 DEGENERATION OF LUMBAR OR LUMBOSACRAL INTERVERTEBRAL DISC: ICD-10-CM

## 2017-06-26 DIAGNOSIS — D68.51 FACTOR V LEIDEN MUTATION (HCC): ICD-10-CM

## 2017-06-26 DIAGNOSIS — I83.93 VARICOSE VEINS OF LEGS: ICD-10-CM

## 2017-06-26 DIAGNOSIS — R10.13 EPIGASTRIC PRESSURE: ICD-10-CM

## 2017-06-26 DIAGNOSIS — R60.9 SWELLING: ICD-10-CM

## 2017-06-26 DIAGNOSIS — M15.9 PRIMARY OSTEOARTHRITIS INVOLVING MULTIPLE JOINTS: ICD-10-CM

## 2017-06-26 DIAGNOSIS — M25.372 LEFT ANKLE INSTABILITY: ICD-10-CM

## 2017-06-26 DIAGNOSIS — M50.90 CERVICAL DISC DISEASE: ICD-10-CM

## 2017-06-26 DIAGNOSIS — Z00.00 MEDICARE ANNUAL WELLNESS VISIT, INITIAL: Primary | ICD-10-CM

## 2017-06-26 DIAGNOSIS — E55.9 VITAMIN D DEFICIENCY DISEASE: ICD-10-CM

## 2017-06-26 DIAGNOSIS — K22.70 BARRETT'S ESOPHAGUS WITHOUT DYSPLASIA: ICD-10-CM

## 2017-06-26 DIAGNOSIS — N18.30 CHRONIC KIDNEY DISEASE, STAGE 3 (HCC): ICD-10-CM

## 2017-06-26 PROCEDURE — G0438 PPPS, INITIAL VISIT: HCPCS | Performed by: NURSE PRACTITIONER

## 2017-06-26 ASSESSMENT — PATIENT HEALTH QUESTIONNAIRE - PHQ9: CLINICAL INTERPRETATION OF PHQ2 SCORE: 2

## 2017-06-26 NOTE — PATIENT INSTRUCTIONS
Continue with care through [facility].  Next Medicare Annual Wellness Visit is due in one year.    Continue care with specialists you are seeing for your chronic problems.    Attached is some preventative information for you to read.          Fall Prevention and Home Safety  Falls cause injuries and can affect all age groups. It is possible to prevent falls.   HOW TO PREVENT FALLS  · Wear shoes with rubber soles that do not have an opening for your toes.   · Keep the inside and outside of your house well lit.   · Use night lights throughout your home.   · Remove clutter from floors.   · Clean up floor spills.   · Remove throw rugs or fasten them to the floor with carpet tape.   · Do not place electrical cords across pathways.   · Put grab bars by your tub, shower, and toilet. Do not use towel bars as grab bars.   · Put handrails on both sides of the stairway. Fix loose handrails.   · Do not climb on stools or stepladders, if possible.   · Do not wax your floors.   · Repair uneven or unsafe sidewalks, walkways, or stairs.   · Keep items you use a lot within reach.   · Be aware of pets.   · Keep emergency numbers next to the telephone.   · Put smoke detectors in your home and near bedrooms.   Ask your doctor what other things you can do to prevent falls.  Document Released: 10/14/2010 Document Revised: 06/18/2013 Document Reviewed: 03/19/2013  ExitCare® Patient Information ©2013 [employer].    Exercise to Stay Healthy      Exercise helps you become and stay healthy.  EXERCISE IDEAS AND TIPS  Choose exercises that:  · You enjoy.   · Fit into your day.   You do not need to exercise really hard to be healthy. You can do exercises at a slow or medium level and stay healthy. You can:  · Stretch before and after working out.   · Try yoga, Pilates, or malik chi.   · Lift weights.   · Walk fast, swim, jog, run, climb stairs, bicycle, dance, or rollerskate.   · Take aerobic classes.   Exercises that burn about  150 calories:  · Running 1 ½ miles in 15 minutes.   · Playing volleyball for 45 to 60 minutes.   · Washing and waxing a car for 45 to 60 minutes.   · Playing touch football for 45 minutes.   · Walking 1 ¾ miles in 35 minutes.   · Pushing a stroller 1 ½ miles in 30 minutes.   · Playing basketball for 30 minutes.   · Raking leaves for 30 minutes.   · Bicycling 5 miles in 30 minutes.   · Walking 2 miles in 30 minutes.   · Dancing for 30 minutes.   · Shoveling snow for 15 minutes.   · Swimming laps for 20 minutes.   · Walking up stairs for 15 minutes.   · Bicycling 4 miles in 15 minutes.   · Gardening for 30 to 45 minutes.   · Jumping rope for 15 minutes.   · Washing windows or floors for 45 to 60 minutes.     One suggestion is to start walking for 10 minutes after each meal.  This will help with digestion and help you to metabolize your meal.       For Weight Loss -   Recommend portion sizes with more fruits/vegetables/high fiber foods.  Stay away from white bread/pastas/white rice/white potatoes.  Increase Fluid intake to 6-8 glasses of water daily.   Eliminate soda's (diet and regular) from your fluid intake.      Document Released: 01/20/2012 Document Revised: 03/11/2013 Document Reviewed: 01/20/2012  ExitCare® Patient Information ©2013 [employer].    Fat and Cholesterol Control Diet  Cholesterol is a wax-like substance. It comes from your liver and is found in certain foods. There is good (HDL) and bad (LDL) cholesterol. Too much cholesterol in your blood can affect your heart. Certain foods can lower or raise your cholesterol. Eat foods that are low in cholesterol.  Saturated and trans fats are bad fats found in foods that will raise your cholesterol. Do not eat foods that are high in saturated and trans fats.  FOODS HIGHER IN SATURATED AND TRANS FATS  · Dairy products, such as whole milk, eggs, cheese, cream, and butter.   · Fatty meats, such as hot dogs, sausage, and salami.   · Fried foods.   · Trans fats  which are found in margarine and pre-made cookies, crackers, and baked goods.   · Tropical oils, such as coconut and palm oils.   Read package labels at the store. Do not buy products that use saturated or trans fats or hydrogenated oils. Find foods labeled:  · Low-fat.   · Low-saturated fat.   · Trans-fat-free.   · Low-cholesterol.   FOODS LOWER IN CHOLESTEROL  ·  Fruit.   · Vegetables.   · Beans, peas, and lentils.   · Fish.   · Lean meat, such as chicken (without skin) or ground turkey.   · Grains, such as barley, rice, couscous, bulgur wheat, and pasta.   · Heart-healthy tub margarine.   PREPARING YOUR FOOD  · Broil, bake, steam, or roast foods. Do not moncada food.   · Use non-stick cooking sprays.   · Use lemon or herbs to flavor food instead of using butter or stick margarine.   · Use nonfat yogurt, salsa, or low-fat dressings for salads.   Document Released: 06/18/2013 Document Reviewed: 06/18/2013  ExitCare® Patient Information ©2013 [employer].    Recommend annual flu vaccine.  Next due in Fall, 2015.  If you decide not to have the flu vaccine, recommend good handwashing and staying out of crowds during flu season.

## 2017-06-26 NOTE — ASSESSMENT & PLAN NOTE
Chronic condition managed with current medical regimen  Stable per review, unchanged   Continue with current meds  Followed by [facility].

## 2017-06-26 NOTE — ASSESSMENT & PLAN NOTE
Chronic condition managed with current medical regimen  Stable per review, denies aching, burning or pain   Continue with current meds  Followed by [facility].

## 2017-06-26 NOTE — ASSESSMENT & PLAN NOTE
Chronic condition managed with current medical regimen  Stable per review with medication   Continue with current meds  Followed by GI.

## 2017-06-26 NOTE — ASSESSMENT & PLAN NOTE
Has been evaluated by GI, per pt GI not cause  Has an appt with cardiology for w/u, states has improved over the past few wks

## 2017-06-26 NOTE — ASSESSMENT & PLAN NOTE
Per pt states still has intermit episodes of instability with tenderness  States has not fallen   Followed by [facility].

## 2017-06-26 NOTE — ASSESSMENT & PLAN NOTE
Chronic condition managed with current medical regimen  Stable per review, endoscopy done 6/2016   Continue with current meds  Followed by [facility].

## 2017-06-26 NOTE — MR AVS SNAPSHOT
"        Hermelinda Mosley   2017 10:20 AM   Office Visit   MRN: [id number]    Department:  [facility]   Dept Phone:  [phone]    Description:  Female : 1950   Provider:  SADIE Neville           Reason for Visit     Annual Exam initial      Allergies as of 2017     No Known Allergies      You were diagnosed with     Medicare annual wellness visit, initial   [855853]  -  Primary     Nuñez's esophagus without dysplasia   [848542]       Cervical disc disease   [150066]       Chronic kidney disease, stage 3   [500302]       Degeneration of lumbar or lumbosacral intervertebral disc   [722.52.ICD-9-CM]       Epigastric pressure   [760335]       Factor V Leiden mutation (CMS-HCC)   [086871]       Left ankle instability   [074820]       Primary osteoarthritis involving multiple joints   [1637411]       Swelling   [025758]       Vitamin D deficiency disease   [214770]       Varicose veins of legs   [413198]         Vital Signs     Blood Pressure Pulse Temperature Height Weight Body Mass Index    130/86 mmHg 65 37.2 °C (99 °F) 1.575 m (5' 2.01\") 72.576 kg (160 lb) 29.26 kg/m2    Oxygen Saturation Smoking Status                97% Former Smoker          Basic Information     Date Of Birth Sex Race Ethnicity Preferred Language    1950 Female White Non- English      Your appointments     2017  9:40 AM   Established Patient with [facility] [address] [address] [phone]           You will be receiving a confirmation call a few days before your appointment from our automated call confirmation system.            Aug 18, 2017 12:20 PM   NEW PATIENT with Sarai Restrepo M.D.   [facility] (--)    [address] [place] [address] [phone]              Problem List              ICD-10-CM Priority Class Noted - Resolved   " Esophageal reflux K21.9   7/31/2009 - Present    Degeneration of lumbar or lumbosacral intervertebral disc M51.37   7/31/2009 - Present    Irritable bowel syndrome K58.9   7/31/2009 - Present    Osteopenia M85.80   9/10/2013 - Present    Left ankle instability M25.372   1/5/2016 - Present    Vitamin D deficiency disease E55.9   1/5/2016 - Present    Cervical disc disease M50.90   1/5/2016 - Present    Primary osteoarthritis involving multiple joints M15.0   4/5/2016 - Present    Chronic kidney disease, stage 3 N18.3   4/5/2016 - Present    Factor V Leiden mutation (CMS-[facility]) D68.51   4/5/2016 - Present    Nuñez's esophagus without dysplasia K22.70   10/17/2016 - Present    Varicose veins of legs I83.93   10/17/2016 - Present    Epigastric pressure R10.13   3/3/2017 - Present      Health Maintenance        Date Due Completion Dates    PAP SMEAR 1/6/2018 1/6/2015 (Done)    Override on 1/6/2015: Done    MAMMOGRAM 6/1/2018 6/1/2017, 1/29/2016, 2/11/2013 (Done), 12/1/2009, 12/1/2009, 10/30/2008, 10/30/2008    Override on 2/11/2013: Done (Dr. Hansen; [place])    BONE DENSITY 1/29/2021 1/29/2016, 6/12/2013    IMM DTaP/Tdap/Td Vaccine (2 - Td) 2/11/2024 2/11/2014    COLONOSCOPY 6/14/2026 6/14/2016            Current Immunizations     13-VALENT PCV PREVNAR 1/6/2016    Influenza TIV (IM) 9/17/2016    Influenza Vaccine Adult HD 9/13/2016    Pneumococcal polysaccharide vaccine (PPSV-23) 1/27/2017    SHINGLES VACCINE 2/11/2014    Tdap Vaccine 2/11/2014      Below and/or attached are the medications your provider expects you to take. Review all of your home medications and newly ordered medications with your provider and/or pharmacist. Follow medication instructions as directed by your provider and/or pharmacist. Please keep your medication list with you and share with your provider. Update the information when medications are discontinued, doses are changed, or new medications (including over-the-counter products) are  added; and carry medication information at all times in the event of emergency situations     Allergies:  No Known Allergies          Medications  Valid as of: June 26, 2017 - 11:53 AM    Generic Name Brand Name Tablet Size Instructions for use    Ascorbic Acid   Take  by mouth.        Cholecalciferol   Take  by mouth.        Diclofenac Sodium (Gel) Diclofenac Sodium 1 % Apply 2gm in a thin layer to joint pain in hands up to 4 times per day if needed        Meloxicam (Tab) MOBIC 15 MG Take 1 Tab by mouth every day.        Multiple Vitamins-Minerals   Take  by mouth.        Omeprazole (CAPSULE DELAYED RELEASE) PRILOSEC 20 MG Take 20 mg by mouth every day.        Probiotic Product   Take  by mouth.        TraMADol HCl (Tab) ULTRAM 50 MG Take 1 Tab by mouth every 8 hours as needed (moderate to severe pain).        .                 Medicines prescribed today were sent to:     [facility] [address] [place] [address], [place] - [address] [facility]    [address] [facility] [address]    Phone: [phone] Fax: [phone]    Open 24 Hours?: No      Medication refill instructions:       If your prescription bottle indicates you have medication refills left, it is not necessary to call your provider’s office. Please contact your pharmacy and they will refill your medication.    If your prescription bottle indicates you do not have any refills left, you may request refills at any time through one of the following ways: The online [employer] system (except Urgent Care), by calling your provider’s office, or by asking your pharmacy to contact your provider’s office with a refill request. Medication refills are processed only during regular business hours and may not be available until the next business day. Your provider may request additional information or to have a follow-up visit with you prior to refilling your medication.   *Please Note: Medication refills are assigned a new Rx number when refilled electronically. Your  pharmacy may indicate that no refills were authorized even though a new prescription for the same medication is available at the pharmacy. Please request the medicine by name with the pharmacy before contacting your provider for a refill.        Instructions    Continue with care through SADIE Claudio.  Next Medicare Annual Wellness Visit is due in one year.    Continue care with specialists you are seeing for your chronic problems.    Attached is some preventative information for you to read.          Fall Prevention and Home Safety  Falls cause injuries and can affect all age groups. It is possible to prevent falls.   HOW TO PREVENT FALLS  · Wear shoes with rubber soles that do not have an opening for your toes.   · Keep the inside and outside of your house well lit.   · Use night lights throughout your home.   · Remove clutter from floors.   · Clean up floor spills.   · Remove throw rugs or fasten them to the floor with carpet tape.   · Do not place electrical cords across pathways.   · Put grab bars by your tub, shower, and toilet. Do not use towel bars as grab bars.   · Put handrails on both sides of the stairway. Fix loose handrails.   · Do not climb on stools or stepladders, if possible.   · Do not wax your floors.   · Repair uneven or unsafe sidewalks, walkways, or stairs.   · Keep items you use a lot within reach.   · Be aware of pets.   · Keep emergency numbers next to the telephone.   · Put smoke detectors in your home and near bedrooms.   Ask your doctor what other things you can do to prevent falls.  Document Released: 10/14/2010 Document Revised: 06/18/2013 Document Reviewed: 03/19/2013  ExitCare® Patient Information ©2013 [employer].    Exercise to Stay Healthy      Exercise helps you become and stay healthy.  EXERCISE IDEAS AND TIPS  Choose exercises that:  · You enjoy.   · Fit into your day.   You do not need to exercise really hard to be healthy. You can do exercises at a slow or  medium level and stay healthy. You can:  · Stretch before and after working out.   · Try yoga, Pilates, or malik chi.   · Lift weights.   · Walk fast, swim, jog, run, climb stairs, bicycle, dance, or rollerskate.   · Take aerobic classes.   Exercises that burn about 150 calories:  · Running 1 ½ miles in 15 minutes.   · Playing volleyball for 45 to 60 minutes.   · Washing and waxing a car for 45 to 60 minutes.   · Playing touch football for 45 minutes.   · Walking 1 ¾ miles in 35 minutes.   · Pushing a stroller 1 ½ miles in 30 minutes.   · Playing basketball for 30 minutes.   · Raking leaves for 30 minutes.   · Bicycling 5 miles in 30 minutes.   · Walking 2 miles in 30 minutes.   · Dancing for 30 minutes.   · Shoveling snow for 15 minutes.   · Swimming laps for 20 minutes.   · Walking up stairs for 15 minutes.   · Bicycling 4 miles in 15 minutes.   · Gardening for 30 to 45 minutes.   · Jumping rope for 15 minutes.   · Washing windows or floors for 45 to 60 minutes.     One suggestion is to start walking for 10 minutes after each meal.  This will help with digestion and help you to metabolize your meal.       For Weight Loss -   Recommend portion sizes with more fruits/vegetables/high fiber foods.  Stay away from white bread/pastas/white rice/white potatoes.  Increase Fluid intake to 6-8 glasses of water daily.   Eliminate soda's (diet and regular) from your fluid intake.      Document Released: 01/20/2012 Document Revised: 03/11/2013 Document Reviewed: 01/20/2012  ExitCare® Patient Information ©2013 [employer], [facility].    Fat and Cholesterol Control Diet  Cholesterol is a wax-like substance. It comes from your liver and is found in certain foods. There is good (HDL) and bad (LDL) cholesterol. Too much cholesterol in your blood can affect your heart. Certain foods can lower or raise your cholesterol. Eat foods that are low in cholesterol.  Saturated and trans fats are bad fats found in foods that will raise your cholesterol.  Do not eat foods that are high in saturated and trans fats.  FOODS HIGHER IN SATURATED AND TRANS FATS  · Dairy products, such as whole milk, eggs, cheese, cream, and butter.   · Fatty meats, such as hot dogs, sausage, and salami.   · Fried foods.   · Trans fats which are found in margarine and pre-made cookies, crackers, and baked goods.   · Tropical oils, such as coconut and palm oils.   Read package labels at the store. Do not buy products that use saturated or trans fats or hydrogenated oils. Find foods labeled:  · Low-fat.   · Low-saturated fat.   · Trans-fat-free.   · Low-cholesterol.   FOODS LOWER IN CHOLESTEROL  ·  Fruit.   · Vegetables.   · Beans, peas, and lentils.   · Fish.   · Lean meat, such as chicken (without skin) or ground turkey.   · Grains, such as barley, rice, couscous, bulgur wheat, and pasta.   · Heart-healthy tub margarine.   PREPARING YOUR FOOD  · Broil, bake, steam, or roast foods. Do not moncada food.   · Use non-stick cooking sprays.   · Use lemon or herbs to flavor food instead of using butter or stick margarine.   · Use nonfat yogurt, salsa, or low-fat dressings for salads.   Document Released: 06/18/2013 Document Reviewed: 06/18/2013  ExitCare® Patient Information ©2013 [employer], [employer].    Recommend annual flu vaccine.  Next due in Fall, 2015.  If you decide not to have the flu vaccine, recommend good handwashing and staying out of crowds during flu season.               Other Notes About Your Plan     GYN-Dr Hansen           MyChart Access Code: Activation code not generated  Current MyChart Status: Active

## 2017-06-26 NOTE — ASSESSMENT & PLAN NOTE
Chronic condition managed with current medical regimen  Stable per review   Continue with current meds prn  Followed by SADIE Claudio.

## 2017-06-26 NOTE — ASSESSMENT & PLAN NOTE
Chronic condition managed with current medical regimen  3/24/2017   GFR If African American >60 >60 mL/min/1.73 m 2 Final      GFR If Non  53 (A) >60 mL/min/1.73 m 2 Final     Bun 14 8 - 22 mg/dL Final      Creatinine 1.04 0.50 - 1.40 mg/dL Final    Continue with current meds  Followed by SADIE Claudio.

## 2017-06-26 NOTE — PROGRESS NOTES
CC:   Medicare Annual Wellness Visit    HPI:  Hermelinda is a 67 y.o. here for Medicare Annual Wellness Visit    Patient Active Problem List    Diagnosis Date Noted   • Epigastric pressure 03/03/2017   • Nuñez's esophagus without dysplasia 10/17/2016   • Varicose veins of legs 10/17/2016   • Primary osteoarthritis involving multiple joints 04/05/2016   • Chronic kidney disease, stage 3 04/05/2016   • Factor V Leiden mutation (CMS-HCC) 04/05/2016   • Left ankle instability 01/05/2016   • Vitamin D deficiency disease 01/05/2016   • Cervical disc disease 01/05/2016   • Osteopenia 09/10/2013   • Esophageal reflux 07/31/2009   • Degeneration of lumbar or lumbosacral intervertebral disc 07/31/2009   • Irritable bowel syndrome 07/31/2009     Current Outpatient Prescriptions   Medication Sig Dispense Refill   • Multiple Vitamins-Minerals (MULTIVITAMIN PO) Take  by mouth.     • Cholecalciferol (VITAMIN D PO) Take  by mouth.     • Probiotic Product (PROBIOTIC DAILY PO) Take  by mouth.     • Ascorbic Acid (VITAMIN C PO) Take  by mouth.     • meloxicam (MOBIC) 15 MG tablet Take 1 Tab by mouth every day. 30 Tab 0   • omeprazole (PRILOSEC) 20 MG delayed-release capsule Take 20 mg by mouth every day.     • Diclofenac Sodium 1 % Gel Apply 2gm in a thin layer to joint pain in hands up to 4 times per day if needed 1 Tube 3   • tramadol (ULTRAM) 50 MG TABS Take 1 Tab by mouth every 8 hours as needed (moderate to severe pain). 60 Tab 0     No current facility-administered medications for this visit.      Current supplements: no  Chronic narcotic pain medicines: no  Allergies: Review of patient's allergies indicates no known allergies.  Exercise: yes  Current social contact/activities: Has support of family and friends      Screening:  Depression Screening    Little interest or pleasure in doing things?  1 - several days  Feeling down, depressed , or hopeless? 1 - several days  Trouble falling or staying asleep, or sleeping too much?      Feeling tired or having little energy?     Poor appetite or overeating?     Feeling bad about yourself - or that you are a failure or have let yourself or your family down?    Trouble concentrating on things, such as reading the newspaper or watching television?    Moving or speaking so slowly that other people could have noticed.  Or the opposite - being so fidgety or restless that you have been moving around a lot more than usual?     Thoughts that you would be better off dead, or of hurting yourself?     Patient Health Questionnaire Score:    If depressive symptoms identified deferred to follow up visit unless specifically addressed in assessment and plan.    Interpretation of PHQ-9 Total Score   Score Severity   1-4 Minimal Depression   5-9 Mild Depression   10-14 Moderate Depression   15-19 Moderately Severe Depression   20-27 Severe Depression    Screening for Cognitive Impairment    Three Minute Recall (banana, sunrise, fence)  3/3    Draw clock face with all 12 numbers set to the hand to show 10 minures past 11 o'clock  1 5  If cognitive concerns identified deferred to follow up visit unless specifically addressed in assessment and plan.    Fall Risk Assessment    Has the patient had two or more falls in the last year or any fall with injury in the last year?  No  If Fall Risk identified deferred to follow up visit unless specifically addressed in assessment and plan.    Safety Assessment    Throw rugs on floor.  Yes  Handrails on all stairs.  Yes  Good lighting in all hallways.  Yes  Difficulty hearing.  No  Patient counseled about all safety risks that were identified.    Functional Assessment ADLs    Are there any barriers preventing you from cooking for yourself or meeting nutritional needs?  No.    Are there any barriers preventing you from driving safely or obtaining transportation?  No.    Are there any barriers preventing you from using a telephone or calling for help?  No.    Are there any  barriers preventing you from shopping?  No.    Are there any barriers preventing you from taking care of your own finances?  Yes.  manages finances.    Are there any barriers preventing you from managing your medications?  No.    Are currently engaing any exercise or physical activity?  Yes.       Health Maintenance Summary                Annual Wellness Visit Overdue 1950     PAP SMEAR Next Due 1/6/2018      Done 1/6/2015     MAMMOGRAM Next Due 6/1/2018      Done 6/1/2017 MA-MAMMO SCREENING BILAT W/TOMOSYNTHESIS W/CAD     Patient has more history with this topic...    BONE DENSITY Next Due 1/29/2021      Done 1/29/2016 DS-BONE DENSITY STUDY (DEXA)     Patient has more history with this topic...    IMM DTaP/Tdap/Td Vaccine Next Due 2/11/2024      Done 2/11/2014 Imm Admin: Tdap Vaccine    COLONOSCOPY Next Due 6/14/2026      Done 6/14/2016 AMB REFERRAL TO GI FOR COLONOSCOPY          Patient Care Team:  SADIE Claudio as PCP - General (Family Medicine)  Timothy Benjamin M.D. as Consulting Physician (Gastroenterology)        Social History   Substance Use Topics   • Smoking status: Former Smoker -- 0.25 packs/day for 4 years     Types: Cigarettes     Quit date: 01/01/1970   • Smokeless tobacco: Never Used      Comment: only smokes 1-2 cigs a couple times a week for 1-2 years   • Alcohol Use: 0.0 oz/week     0 Standard drinks or equivalent per week      Comment: occasional       Family History   Problem Relation Age of Onset   • Hypertension Mother    • Diabetes Mother      pre diabetes   • Diabetes Father    • Hypertension Father    • Kidney Disease Father      She  has a past medical history of Hernia of unspecified site of abdominal cavity without mention of obstruction or gangrene (repaired); Vein, varicose (stripped); Screening mammogram (10/30/2008=normal); Pap smear (due); History of colonoscopy (2005=normal); Unspecified hemorrhoids without mention of complication; Degeneration of lumbar  "or lumbosacral intervertebral disc; Abdominal pain, unspecified site; Diarrhea; Recurrent UTI (9/4/2009); Swelling (9/4/2009); Irritable bowel syndrome; and Esophageal reflux. She also has no past medical history of Encounter for long-term (current) use of other medications.   Past Surgical History   Procedure Laterality Date   • Cholecystectomy     • Hernia repair     • Vein stripping         ROS:    Ostomy or other tubes or amputations: no  Chronic oxygen use no  Last eye exam 2016  : Denies incontinence.   Gait: Uses no assistive device   Hearing excellent.    Dentition good    Exam: Blood pressure 130/86, pulse 65, temperature 37.2 °C (99 °F), height 1.575 m (5' 2.01\"), weight 72.576 kg (160 lb), SpO2 97 %. Body mass index is 29.26 kg/(m^2).  Alert, oriented in no acute distress.  Eye contact is good, speech goal directed, affect calm      Assessment and Plan. The following treatment and monitoring plan is recommended for all problems as listed below:   Nuñez's esophagus without dysplasia  Chronic condition managed with current medical regimen  Stable per review, endoscopy done 6/2016   Continue with current meds  Followed by MERLY ClaudioRALOK.        Cervical disc disease  Chronic condition managed with current medical regimen  Stable per review   Continue with current meds prn  Followed by MERLY ClaudioRALOK.        Chronic kidney disease, stage 3  Chronic condition managed with current medical regimen  3/24/2017   GFR If African American >60 >60 mL/min/1.73 m 2 Final      GFR If Non  53 (A) >60 mL/min/1.73 m 2 Final     Bun 14 8 - 22 mg/dL Final      Creatinine 1.04 0.50 - 1.40 mg/dL Final    Continue with current meds  Followed by MERLY ClaudioRJESS..        Degeneration of Lumbar or Lumbosacral Intervertebral Disc  Chronic condition managed with current medical regimen  Stable per review, unchanged   Continue with current meds  Followed by MERLY ClaudioRALOK. "        Epigastric pressure  Has been evaluated by GI, per pt GI not cause  Has an appt with cardiology for w/u, states has improved over the past few wks    Esophageal Reflux  Chronic condition managed with current medical regimen  Stable per review   Continue with current meds  Followed by SADIE Claudio.        Factor V Leiden mutation (CMS-[facility])  Pt is fully aware was dx and implications  Has 2 dtrs who are positive    Hemorrhoids  Historical data    Irritable Bowel Syndrome  Chronic condition managed with current medical regimen  Stable per review with medication   Continue with current meds  Followed by GI.        Osteopenia  Next DEXA 2021    Left ankle instability  Per pt states still has intermit episodes of instability with tenderness  States has not fallen   Followed by SADIE Claudio.     Left ankle sprain  Historical data    Primary osteoarthritis involving multiple joints  Chronic condition managed with current medical regimen  Stable per review, unchanged   Continue with current meds  Followed by SADIE Claudio.        Swelling  ankles  resolved    Vitamin D deficiency disease  Chronic condition managed with current medical regimen  Stable per review   Continue with current meds  Followed by SADIE Claudio.         Varicose veins of legs  Chronic condition managed with current medical regimen  Stable per review, denies aching, burning or pain   Continue with current meds  Followed by SADIE Claudio.            Health Care Screening recommendations reviewed with patient today: per Patient Instructions  DPA/Advanced directive: .has NO advanced directive - not interested in additional information    Next office visit for recheck of chronic medical conditions is due with SADIE Claudio 6/27/2017

## 2017-06-26 NOTE — ASSESSMENT & PLAN NOTE
Chronic condition managed with current medical regimen  Stable per review   Continue with current meds  Followed by SADIE Claudio.

## 2017-06-27 ENCOUNTER — OFFICE VISIT (OUTPATIENT)
Dept: MEDICAL GROUP | Facility: PHYSICIAN GROUP | Age: 67
End: 2017-06-27
Payer: MEDICARE

## 2017-06-27 VITALS
HEIGHT: 62 IN | DIASTOLIC BLOOD PRESSURE: 82 MMHG | RESPIRATION RATE: 18 BRPM | OXYGEN SATURATION: 97 % | TEMPERATURE: 98.4 F | SYSTOLIC BLOOD PRESSURE: 130 MMHG | WEIGHT: 160 LBS | BODY MASS INDEX: 29.44 KG/M2 | HEART RATE: 86 BPM

## 2017-06-27 DIAGNOSIS — K22.70 BARRETT'S ESOPHAGUS WITHOUT DYSPLASIA: ICD-10-CM

## 2017-06-27 DIAGNOSIS — R07.89 CHEST PRESSURE: ICD-10-CM

## 2017-06-27 PROCEDURE — 99214 OFFICE O/P EST MOD 30 MIN: CPT | Performed by: FAMILY MEDICINE

## 2017-06-27 RX ORDER — ALBUTEROL SULFATE 90 UG/1
2 AEROSOL, METERED RESPIRATORY (INHALATION) EVERY 6 HOURS PRN
Qty: 8.5 G | Refills: 3 | Status: SHIPPED | OUTPATIENT
Start: 2017-06-27 | End: 2018-08-23

## 2017-06-27 NOTE — MR AVS SNAPSHOT
"Hermelinda Mosley   2017 9:40 AM   Office Visit   MRN: [id number]    Department:  [facility]   Dept Phone:  [phone]    Description:  Female : 1950   Provider:  Maribel Long M.D.           Reason for Visit     Shortness of Breath F/V from       Allergies as of 2017     No Known Allergies      You were diagnosed with     Chest pressure   [072311]         Vital Signs     Blood Pressure Pulse Temperature Respirations Height Weight    130/82 mmHg 86 36.9 °C (98.4 °F) 18 1.575 m (5' 2.01\") 72.576 kg (160 lb)    Body Mass Index Oxygen Saturation Smoking Status             29.26 kg/m2 97% Former Smoker         Basic Information     Date Of Birth Sex Race Ethnicity Preferred Language    1950 Female White Non- English      Your appointments     Aug 18, 2017 12:20 PM   NEW PATIENT with Sarai Restrepo M.D.   [facility]    [address] [place] [address] [phone]            Sep 28, 2017 10:20 AM   Established Patient with Maribel Long M.D.   [address] [address] [phone]           You will be receiving a confirmation call a few days before your appointment from our automated call confirmation system.              Problem List              ICD-10-CM Priority Class Noted - Resolved    Esophageal reflux K21.9   2009 - Present    Degeneration of lumbar or lumbosacral intervertebral disc M51.37   2009 - Present    Irritable bowel syndrome K58.9   2009 - Present    Osteopenia M85.80   9/10/2013 - Present    Left ankle instability M25.372   2016 - Present    Vitamin D deficiency disease E55.9   2016 - Present    Cervical disc disease M50.90   2016 - Present    Primary osteoarthritis involving multiple joints M15.0   2016 - Present    Chronic kidney disease, stage 3 N18.3   2016 - Present    Factor V " Leiden mutation (CMS-[facility]) D68.51   4/5/2016 - Present    Nuñez's esophagus without dysplasia K22.70   10/17/2016 - Present    Varicose veins of legs I83.93   10/17/2016 - Present    Epigastric pressure R10.13   3/3/2017 - Present    Chest pressure R07.89   6/27/2017 - Present      Health Maintenance        Date Due Completion Dates    PAP SMEAR 1/6/2018 1/6/2015 (Done)    Override on 1/6/2015: Done    MAMMOGRAM 6/1/2018 6/1/2017, 1/29/2016, 2/11/2013 (Done), 12/1/2009, 12/1/2009, 10/30/2008, 10/30/2008    Override on 2/11/2013: Done (Dr. Hansen; [employer])    BONE DENSITY 1/29/2021 1/29/2016, 6/12/2013    IMM DTaP/Tdap/Td Vaccine (2 - Td) 2/11/2024 2/11/2014    COLONOSCOPY 6/14/2026 6/14/2016            Current Immunizations     13-VALENT PCV PREVNAR 1/6/2016    Influenza TIV (IM) 9/17/2016    Influenza Vaccine Adult HD 9/13/2016    Pneumococcal polysaccharide vaccine (PPSV-23) 1/27/2017    SHINGLES VACCINE 2/11/2014    Tdap Vaccine 2/11/2014      Below and/or attached are the medications your provider expects you to take. Review all of your home medications and newly ordered medications with your provider and/or pharmacist. Follow medication instructions as directed by your provider and/or pharmacist. Please keep your medication list with you and share with your provider. Update the information when medications are discontinued, doses are changed, or new medications (including over-the-counter products) are added; and carry medication information at all times in the event of emergency situations     Allergies:  No Known Allergies          Medications  Valid as of: June 27, 2017 - 10:40 AM    Generic Name Brand Name Tablet Size Instructions for use    Albuterol Sulfate (Aero Soln) albuterol 108 (90 BASE) MCG/ACT Inhale 2 Puffs by mouth every 6 hours as needed for Shortness of Breath.        Ascorbic Acid   Take  by mouth.        Cholecalciferol   Take  by mouth.        Diclofenac Sodium (Gel) Diclofenac Sodium  1 % Apply 2gm in a thin layer to joint pain in hands up to 4 times per day if needed        Meloxicam (Tab) MOBIC 15 MG Take 1 Tab by mouth every day.        Multiple Vitamins-Minerals   Take  by mouth.        Omeprazole (CAPSULE DELAYED RELEASE) PRILOSEC 20 MG Take 20 mg by mouth every day.        Probiotic Product   Take  by mouth.        TraMADol HCl (Tab) ULTRAM 50 MG Take 1 Tab by mouth every 8 hours as needed (moderate to severe pain).        .                 Medicines prescribed today were sent to:     [address], [place] [address]    [address] [facility] [place] [address]    Phone: [phone] Fax: [phone]    Open 24 Hours?: No      Medication refill instructions:       If your prescription bottle indicates you have medication refills left, it is not necessary to call your provider’s office. Please contact your pharmacy and they will refill your medication.    If your prescription bottle indicates you do not have any refills left, you may request refills at any time through one of the following ways: The online [employer] system (except Urgent Care), by calling your provider’s office, or by asking your pharmacy to contact your provider’s office with a refill request. Medication refills are processed only during regular business hours and may not be available until the next business day. Your provider may request additional information or to have a follow-up visit with you prior to refilling your medication.   *Please Note: Medication refills are assigned a new Rx number when refilled electronically. Your pharmacy may indicate that no refills were authorized even though a new prescription for the same medication is available at the pharmacy. Please request the medicine by name with the pharmacy before contacting your provider for a refill.        Your To Do List     Future Labs/Procedures Complete By Expires    ALPHA-1-ANTITRYPSIN  As directed 6/27/2018     ECHO-REST/STRESS W/O CONTRAST  As directed 6/27/2018    LIPID PROFILE  As directed 6/28/2018      Other Notes About Your Plan     GYN-Dr Hansen           [facility] Access Code: Activation code not generated  Current MyChart Status: Active

## 2017-06-27 NOTE — PROGRESS NOTES
Subjective:   Hermelinda Mosley is a 67 y.o. female here today for persistent dyspnea.     Chest pressure  She has feeling of chest pressure since March. She had an EGD which showed Barretts and is on Nexium, but still has persistent chest discomfort. For three months it was present almost all the time with a feeling of shortness of breath many days when she had to stop and catch her breath. Once even when combing her hair she got out of breath and had to stop. She has the symptoms also when seated, feeling like she couldn't breathe. Once when she ran, she had pain in her right arm and neck, thought it was due to her neck. She has a weird sensatiion in her chest not pain but more shortness of breath is not worse with exercise or activity but happens all the time. She does not wake up at night due to shortness of breath, she does not have any wheezing. She does not smoke.   She has no family history of CAD. Patient does not have DM, HTN a history of dyslipidemia. I will check fasting lipids.  She does have GFR of 53, no significant LE edema except with long travel.     Both her daughters are believed to have the alpha1 anti trypsin gene. I will start broncho dilator and check pfts to see if she has any picture of emphysema. Will check serum a1 antitrypsin.   Due to some changes on EKG, we have a cardiology referral in place.       Nuñez's esophagus without dysplasia  Stable. Last EGD done this year. She will continue on Nexium.   She continues to follow with GI specialist.        Patient was also concerned about a tingly sensation in her scalp, ears, she has no rash. No pain. She wanted to know if her referral to dermatology is appropriate, but I said we'll continue to monitor this. Since there are no lesions on her skin.    Patient seems to have a lot of anxiety around her symptoms. I notice in the past that she has stated that she has 2 daughters were positive for factor V Leiden mutation. Today, she reports to me that  her 2 daughters are positive for alpha-1 anti-trypsin mutation. I will go ahead and order lab and stress test to make sure that there is no underlying cardiac etiology to her chest pressure but if these are normal I would avoid further invasive testing unless with clear objective indication.     Current medicines (including changes today)  Current Outpatient Prescriptions   Medication Sig Dispense Refill   • albuterol 108 (90 BASE) MCG/ACT Aero Soln inhalation aerosol Inhale 2 Puffs by mouth every 6 hours as needed for Shortness of Breath. 8.5 g 3   • Multiple Vitamins-Minerals (MULTIVITAMIN PO) Take  by mouth.     • Cholecalciferol (VITAMIN D PO) Take  by mouth.     • Probiotic Product (PROBIOTIC DAILY PO) Take  by mouth.     • Ascorbic Acid (VITAMIN C PO) Take  by mouth.     • omeprazole (PRILOSEC) 20 MG delayed-release capsule Take 20 mg by mouth every day.     • tramadol (ULTRAM) 50 MG TABS Take 1 Tab by mouth every 8 hours as needed (moderate to severe pain). 60 Tab 0   • meloxicam (MOBIC) 15 MG tablet Take 1 Tab by mouth every day. 30 Tab 0   • Diclofenac Sodium 1 % Gel Apply 2gm in a thin layer to joint pain in hands up to 4 times per day if needed 1 Tube 3     No current facility-administered medications for this visit.     She  has a past medical history of Hernia of unspecified site of abdominal cavity without mention of obstruction or gangrene (repaired); Vein, varicose (stripped); Screening mammogram (10/30/2008=normal); Pap smear (due); History of colonoscopy (2005=normal); Unspecified hemorrhoids without mention of complication; Degeneration of lumbar or lumbosacral intervertebral disc; Abdominal pain, unspecified site; Diarrhea; Recurrent UTI (9/4/2009); Swelling (9/4/2009); Irritable bowel syndrome; and Esophageal reflux. She also has no past medical history of Encounter for long-term (current) use of other medications.    ROS   No chest pain, no shortness of breath, no abdominal pain        "Objective:     Blood pressure 130/82, pulse 86, temperature 36.9 °C (98.4 °F), resp. rate 18, height 1.575 m (5' 2.01\"), weight 72.576 kg (160 lb), SpO2 97 %. Body mass index is 29.26 kg/(m^2).   Physical Exam:  Constitutional: Alert, no distress.  Skin: Warm, dry, good turgor, no rashes in visible areas.  Eye: Equal, round and reactive, conjunctiva clear, lids normal.  ENMT: Lips without lesions, good dentition, oropharynx clear.  Neck: Trachea midline, no masses, no thyromegaly. No cervical or supraclavicular lymphadenopathy  Respiratory: Unlabored respiratory effort, lungs clear to auscultation, no wheezes, no ronchi.  Cardiovascular: Normal S1, S2, no murmur, no edema.  Abdomen: Soft, non-tender, no masses, no hepatosplenomegaly.  Psych: Alert and oriented x3, normal affect and mood.        Assessment and Plan:   The following treatment plan was discussed    1. Chest pressure  Follow-up with cardiology  - albuterol 108 (90 BASE) MCG/ACT Aero Soln inhalation aerosol; Inhale 2 Puffs by mouth every 6 hours as needed for Shortness of Breath.  Dispense: 8.5 g; Refill: 3  - ALPHA-1-ANTITRYPSIN; Future  - ECHO-REST/STRESS W/O CONTRAST; Future  - LIPID PROFILE; Future    2. Nuñez's esophagus  Continue Nexium, follow-up with GI     3. Tingling and crawling sensation of skin  We'll continue to monitor. Check labs.      Followup: Return in about 3 months (around 9/27/2017) for dyspnea on exertion, barretts.           "

## 2017-06-27 NOTE — ASSESSMENT & PLAN NOTE
Stable. Last EGD done this year. She will continue on Nexium.   She continues to follow with GI specialist.

## 2017-06-27 NOTE — ASSESSMENT & PLAN NOTE
She has feeling of chest pressure since March. She had an EGD which showed Barretts and is on Nexium, but still has persistent chest discomfort. For three months it was present almost all the time with a feeling of shortness of breath many days when she had to stop and catch her breath. Once even when combing her hair she got out of breath and had to stop. She has the symptoms also when seated, feeling like she couldn't breathe. Once when she ran, she had pain in her right arm and neck, thought it was due to her neck. She has a weird sensatiion in her chest not pain but more shortness of breath is not worse with exercise or activity but happens all the time. She does not wake up at night due to shortness of breath, she does not have any wheezing. She does not smoke.   She has no family history of CAD. Patient does not have DM, HTN a history of dyslipidemia. I will check fasting lipids.  She does have GFR of 53, no significant LE edema except with long travel.     Both her daughters are believed to have the alpha1 anti trypsin gene. I will start broncho dilator and check pfts to see if she has any picture of emphysema. Will check serum a1 antitrypsin.   Due to some changes on EKG, we have a cardiology referral in place.

## 2017-06-30 ENCOUNTER — HOSPITAL ENCOUNTER (OUTPATIENT)
Dept: LAB | Facility: MEDICAL CENTER | Age: 67
End: 2017-06-30
Attending: FAMILY MEDICINE
Payer: MEDICARE

## 2017-06-30 ENCOUNTER — OFFICE VISIT (OUTPATIENT)
Dept: URGENT CARE | Facility: PHYSICIAN GROUP | Age: 67
End: 2017-06-30
Payer: MEDICARE

## 2017-06-30 VITALS
SYSTOLIC BLOOD PRESSURE: 102 MMHG | BODY MASS INDEX: 28.52 KG/M2 | HEART RATE: 73 BPM | WEIGHT: 155 LBS | HEIGHT: 62 IN | RESPIRATION RATE: 16 BRPM | OXYGEN SATURATION: 98 % | TEMPERATURE: 98.4 F | DIASTOLIC BLOOD PRESSURE: 64 MMHG

## 2017-06-30 DIAGNOSIS — J02.9 SORE THROAT: ICD-10-CM

## 2017-06-30 DIAGNOSIS — R07.89 CHEST PRESSURE: ICD-10-CM

## 2017-06-30 DIAGNOSIS — J06.9 VIRAL URI: ICD-10-CM

## 2017-06-30 LAB
CHOLEST SERPL-MCNC: 195 MG/DL (ref 100–199)
HDLC SERPL-MCNC: 55 MG/DL
INT CON NEG: NEGATIVE
INT CON POS: POSITIVE
LDLC SERPL CALC-MCNC: 110 MG/DL
S PYO AG THROAT QL: NORMAL
TRIGL SERPL-MCNC: 150 MG/DL (ref 0–149)

## 2017-06-30 PROCEDURE — 99214 OFFICE O/P EST MOD 30 MIN: CPT | Performed by: PHYSICIAN ASSISTANT

## 2017-06-30 PROCEDURE — 36415 COLL VENOUS BLD VENIPUNCTURE: CPT

## 2017-06-30 PROCEDURE — 82103 ALPHA-1-ANTITRYPSIN TOTAL: CPT

## 2017-06-30 PROCEDURE — 87880 STREP A ASSAY W/OPTIC: CPT | Performed by: PHYSICIAN ASSISTANT

## 2017-06-30 PROCEDURE — 80061 LIPID PANEL: CPT

## 2017-06-30 ASSESSMENT — ENCOUNTER SYMPTOMS
COUGH: 1
GASTROINTESTINAL NEGATIVE: 1
CARDIOVASCULAR NEGATIVE: 1
FEVER: 0
WHEEZING: 0
RHINORRHEA: 1
SHORTNESS OF BREATH: 0
SORE THROAT: 1
CHILLS: 1
DIZZINESS: 0
MYALGIAS: 1
HEADACHES: 0
SPUTUM PRODUCTION: 0

## 2017-06-30 ASSESSMENT — COPD QUESTIONNAIRES: COPD: 0

## 2017-06-30 NOTE — PROGRESS NOTES
Subjective:      Hermelinda Mosley is a 67 y.o. female who presents with Cough            Cough  This is a new problem. The current episode started 1 to 4 weeks ago. The problem has been gradually worsening. The problem occurs every few minutes. The cough is non-productive. Associated symptoms include chills, myalgias, postnasal drip, rhinorrhea and a sore throat. Pertinent negatives include no ear pain, fever, headaches, nasal congestion, shortness of breath or wheezing. She has tried OTC cough suppressant for the symptoms. The treatment provided mild relief. There is no history of asthma, bronchitis, COPD or pneumonia.       PMH:  has a past medical history of Hernia of unspecified site of abdominal cavity without mention of obstruction or gangrene (repaired); Vein, varicose (stripped); Screening mammogram (10/30/2008=normal); Pap smear (due); History of colonoscopy (2005=normal); Unspecified hemorrhoids without mention of complication; Degeneration of lumbar or lumbosacral intervertebral disc; Abdominal pain, unspecified site; Diarrhea; Recurrent UTI (9/4/2009); Swelling (9/4/2009); Irritable bowel syndrome; and Esophageal reflux. She also has no past medical history of Encounter for long-term (current) use of other medications.  MEDS:   Current outpatient prescriptions:   •  Multiple Vitamins-Minerals (MULTIVITAMIN PO), Take  by mouth., Disp: , Rfl:   •  Cholecalciferol (VITAMIN D PO), Take  by mouth., Disp: , Rfl:   •  Probiotic Product (PROBIOTIC DAILY PO), Take  by mouth., Disp: , Rfl:   •  Ascorbic Acid (VITAMIN C PO), Take  by mouth., Disp: , Rfl:   •  omeprazole (PRILOSEC) 20 MG delayed-release capsule, Take 20 mg by mouth every day., Disp: , Rfl:   •  albuterol 108 (90 BASE) MCG/ACT Aero Soln inhalation aerosol, Inhale 2 Puffs by mouth every 6 hours as needed for Shortness of Breath., Disp: 8.5 g, Rfl: 3  •  meloxicam (MOBIC) 15 MG tablet, Take 1 Tab by mouth every day., Disp: 30 Tab, Rfl: 0  •  Diclofenac  "Sodium 1 % Gel, Apply 2gm in a thin layer to joint pain in hands up to 4 times per day if needed, Disp: 1 Tube, Rfl: 3  •  tramadol (ULTRAM) 50 MG TABS, Take 1 Tab by mouth every 8 hours as needed (moderate to severe pain)., Disp: 60 Tab, Rfl: 0  ALLERGIES: No Known Allergies  SURGHX:   Past Surgical History   Procedure Laterality Date   • Cholecystectomy     • Hernia repair     • Vein stripping       SOCHX:  reports that she quit smoking about 47 years ago. Her smoking use included Cigarettes. She has a 1 pack-year smoking history. She has never used smokeless tobacco. She reports that she drinks alcohol. She reports that she does not use illicit drugs.  FH: family history includes Diabetes in her father and mother; Hypertension in her father and mother; Kidney Disease in her father.      Review of Systems   Constitutional: Positive for chills and malaise/fatigue. Negative for fever.   HENT: Positive for postnasal drip, rhinorrhea and sore throat. Negative for congestion and ear pain.    Respiratory: Positive for cough. Negative for sputum production, shortness of breath and wheezing.    Cardiovascular: Negative.    Gastrointestinal: Negative.    Musculoskeletal: Positive for myalgias. Negative for joint pain.   Neurological: Negative for dizziness and headaches.       Medications, Allergies, and current problem list reviewed today in Epic     Objective:     /64 mmHg  Pulse 73  Temp(Src) 36.9 °C (98.4 °F)  Resp 16  Ht 1.575 m (5' 2\")  Wt 70.308 kg (155 lb)  BMI 28.34 kg/m2  SpO2 98%     Physical Exam   Constitutional: She is oriented to person, place, and time. She appears well-developed and well-nourished. No distress.   HENT:   Head: Normocephalic and atraumatic.   Right Ear: Tympanic membrane and external ear normal.   Left Ear: Tympanic membrane and external ear normal.   Nose: Nose normal.   Mouth/Throat: Oropharynx is clear and moist. No oropharyngeal exudate.   Eyes: Conjunctivae and EOM are " normal. Pupils are equal, round, and reactive to light. Right eye exhibits no discharge. Left eye exhibits no discharge.   Neck: Normal range of motion. Neck supple.   Cardiovascular: Normal rate, regular rhythm and normal heart sounds.    Pulmonary/Chest: Effort normal and breath sounds normal. No respiratory distress. She has no wheezes.   Musculoskeletal: Normal range of motion.   Lymphadenopathy:     She has no cervical adenopathy.   Neurological: She is alert and oriented to person, place, and time.   Skin: Skin is warm and dry. She is not diaphoretic.   Psychiatric: She has a normal mood and affect. Her behavior is normal. Judgment and thought content normal.   Nursing note and vitals reviewed.              Assessment/Plan:     1. Sore throat  POCT Rapid Strep A   2. Viral URI       Rapid strep: Negative  Vitals normal, exam unremarkable, no signs of bacterial process at this time.  OTC meds and conservative measures as discussed  Return to clinic or go to ED if symptoms worsen or persist. Indications for ED discussed at length. Patient voices understanding. Follow-up with your primary care provider in 3-5 days. Red flags discussed.    Please note that this dictation was created using voice recognition software. I have made every reasonable attempt to correct obvious errors, but I expect that there are errors of grammar and possibly content that I did not discover before finalizing the note.

## 2017-06-30 NOTE — MR AVS SNAPSHOT
"        Hermelinda Mosley   2017 10:10 AM   Office Visit   MRN: [id number]    Department:  [facility]   Dept Phone:  [phone]    Description:  Female : 1950   Provider:  Jeffery Farr PA-C           Reason for Visit     Cough sore throat, x2.5 weeks      Allergies as of 2017     No Known Allergies      You were diagnosed with     Sore throat   [907726]       Viral URI   [[id number]]         Vital Signs     Blood Pressure Pulse Temperature Respirations Height Weight    102/64 mmHg 73 36.9 °C (98.4 °F) 16 1.575 m (5' 2\") 70.308 kg (155 lb)    Body Mass Index Oxygen Saturation Smoking Status             28.34 kg/m2 98% Former Smoker         Basic Information     Date Of Birth Sex Race Ethnicity Preferred Language    1950 Female White Non- English      Your appointments     Aug 10, 2017  7:15 AM   Stress Echo with [facility]CHO [facility] EXAM 12   ECHOCARDIOLOGY [facility])    [address] [facility] [address] [address]   [phone]            Aug 18, 2017 12:20 PM   NEW PATIENT with [facility] (--)    [address] [place] [address] [phone]            Sep 28, 2017 10:20 AM   Established Patient with [facility] [address] [address] [phone]           You will be receiving a confirmation call a few days before your appointment from our automated call confirmation system.              Problem List              ICD-10-CM Priority Class Noted - Resolved    Esophageal reflux K21.9   2009 - Present    Degeneration of lumbar or lumbosacral intervertebral disc M51.37   2009 - Present    Irritable bowel syndrome K58.9   2009 - Present    Osteopenia M85.80   9/10/2013 - Present    Left ankle instability M25.372   2016 - Present    Vitamin D deficiency disease E55.9   2016 - Present    Cervical disc disease M50.90 "   1/5/2016 - Present    Primary osteoarthritis involving multiple joints M15.0   4/5/2016 - Present    Chronic kidney disease, stage 3 N18.3   4/5/2016 - Present    Factor V Leiden mutation (CMS-[facility]) D68.51   4/5/2016 - Present    Nuñez's esophagus without dysplasia K22.70   10/17/2016 - Present    Varicose veins of legs I83.93   10/17/2016 - Present    Epigastric pressure R10.13   3/3/2017 - Present    Chest pressure R07.89   6/27/2017 - Present      Health Maintenance        Date Due Completion Dates    PAP SMEAR 1/6/2018 1/6/2015 (Done)    Override on 1/6/2015: Done    MAMMOGRAM 6/1/2018 6/1/2017, 1/29/2016, 2/11/2013 (Done), 12/1/2009, 12/1/2009, 10/30/2008, 10/30/2008    Override on 2/11/2013: Done (Dr. Hansen; [place])    BONE DENSITY 1/29/2021 1/29/2016, 6/12/2013    IMM DTaP/Tdap/Td Vaccine (2 - Td) 2/11/2024 2/11/2014    COLONOSCOPY 6/14/2026 6/14/2016            Results     POCT Rapid Strep A      Component    Rapid Strep Screen    neg    Internal Control Positive    Positive    Internal Control Negative    Negative                        Current Immunizations     13-VALENT PCV PREVNAR 1/6/2016    Influenza TIV (IM) 9/17/2016    Influenza Vaccine Adult HD 9/13/2016    Pneumococcal polysaccharide vaccine (PPSV-23) 1/27/2017    SHINGLES VACCINE 2/11/2014    Tdap Vaccine 2/11/2014      Below and/or attached are the medications your provider expects you to take. Review all of your home medications and newly ordered medications with your provider and/or pharmacist. Follow medication instructions as directed by your provider and/or pharmacist. Please keep your medication list with you and share with your provider. Update the information when medications are discontinued, doses are changed, or new medications (including over-the-counter products) are added; and carry medication information at all times in the event of emergency situations     Allergies:  No Known Allergies          Medications  Valid as  of: June 30, 2017 -  2:54 PM    Generic Name Brand Name Tablet Size Instructions for use    Albuterol Sulfate (Aero Soln) albuterol 108 (90 BASE) MCG/ACT Inhale 2 Puffs by mouth every 6 hours as needed for Shortness of Breath.        Ascorbic Acid   Take  by mouth.        Cholecalciferol   Take  by mouth.        Diclofenac Sodium (Gel) Diclofenac Sodium 1 % Apply 2gm in a thin layer to joint pain in hands up to 4 times per day if needed        Meloxicam (Tab) MOBIC 15 MG Take 1 Tab by mouth every day.        Multiple Vitamins-Minerals   Take  by mouth.        Omeprazole (CAPSULE DELAYED RELEASE) PRILOSEC 20 MG Take 20 mg by mouth every day.        Probiotic Product   Take  by mouth.        TraMADol HCl (Tab) ULTRAM 50 MG Take 1 Tab by mouth every 8 hours as needed (moderate to severe pain).        .                 Medicines prescribed today were sent to:     [facility] [address] [place] [address] [address] [facility]    [address] [place] [address]    Phone: [phone] Fax: [phone]    Open 24 Hours?: No      Medication refill instructions:       If your prescription bottle indicates you have medication refills left, it is not necessary to call your provider’s office. Please contact your pharmacy and they will refill your medication.    If your prescription bottle indicates you do not have any refills left, you may request refills at any time through one of the following ways: The online [employer] system (except Urgent Care), by calling your provider’s office, or by asking your pharmacy to contact your provider’s office with a refill request. Medication refills are processed only during regular business hours and may not be available until the next business day. Your provider may request additional information or to have a follow-up visit with you prior to refilling your medication.   *Please Note: Medication refills are assigned a new Rx number when refilled electronically. Your pharmacy may  indicate that no refills were authorized even though a new prescription for the same medication is available at the pharmacy. Please request the medicine by name with the pharmacy before contacting your provider for a refill.        Other Notes About Your Plan     GYN-Dr Jade Michaels Access Code: Activation code not generated  Current [facility] Status: Active

## 2017-07-02 LAB — A1AT SERPL-MCNC: 83 MG/DL (ref 90–200)

## 2017-07-06 ENCOUNTER — TELEPHONE (OUTPATIENT)
Dept: MEDICAL GROUP | Facility: PHYSICIAN GROUP | Age: 67
End: 2017-07-06

## 2017-07-07 NOTE — TELEPHONE ENCOUNTER
----- Message from Maribel Long M.D. sent at 7/3/2017  3:30 PM PDT -----  Hermelinda,  The alpha-1 antitrypsin is normal elevated. In fact a little bit low, so we'll definitely not an anti-one antitrypsin problem.  Also, her cholesterols have slightly worsened from a year ago. Triglycerides are slightly high at 150 and LDL or bad cholesterol is 10 points above normal at 110. I advised diet changes, decrease fried fatty foods, eat more fresh fruits, vegetables and fish try to get 30 minutes of exercise daily like a walk.  Maribel Long M.D.

## 2017-07-07 NOTE — TELEPHONE ENCOUNTER
Patient has been informed, however, she is unclear about the alpha-1 antitrypsin.  She is wondering if she has this gene or not? Patient states her daughters have this and they are trying to figure out if they got this from her or her .

## 2017-08-10 ENCOUNTER — HOSPITAL ENCOUNTER (OUTPATIENT)
Dept: CARDIOLOGY | Facility: MEDICAL CENTER | Age: 67
End: 2017-08-10
Attending: FAMILY MEDICINE
Payer: MEDICARE

## 2017-08-10 DIAGNOSIS — R07.89 CHEST PRESSURE: ICD-10-CM

## 2017-08-10 LAB — LV EJECT FRACT  99904: 65

## 2017-08-10 PROCEDURE — 93018 CV STRESS TEST I&R ONLY: CPT | Performed by: INTERNAL MEDICINE

## 2017-08-10 PROCEDURE — 93017 CV STRESS TEST TRACING ONLY: CPT

## 2017-08-10 PROCEDURE — 93350 STRESS TTE ONLY: CPT

## 2017-08-10 PROCEDURE — 93350 STRESS TTE ONLY: CPT | Mod: 26 | Performed by: INTERNAL MEDICINE

## 2017-08-18 ENCOUNTER — OFFICE VISIT (OUTPATIENT)
Dept: CARDIOLOGY | Facility: PHYSICIAN GROUP | Age: 67
End: 2017-08-18
Payer: MEDICARE

## 2017-08-18 VITALS
WEIGHT: 159 LBS | SYSTOLIC BLOOD PRESSURE: 110 MMHG | OXYGEN SATURATION: 96 % | BODY MASS INDEX: 29.26 KG/M2 | DIASTOLIC BLOOD PRESSURE: 64 MMHG | HEIGHT: 62 IN | HEART RATE: 81 BPM

## 2017-08-18 DIAGNOSIS — R07.89 CHEST PRESSURE: ICD-10-CM

## 2017-08-18 PROCEDURE — 99214 OFFICE O/P EST MOD 30 MIN: CPT | Performed by: INTERNAL MEDICINE

## 2017-08-18 ASSESSMENT — ENCOUNTER SYMPTOMS
DIZZINESS: 0
NAUSEA: 1
SPUTUM PRODUCTION: 0
HEADACHES: 0
HEARTBURN: 1
BACK PAIN: 0
BRUISES/BLEEDS EASILY: 0
BLOOD IN STOOL: 0
DEPRESSION: 0
WHEEZING: 0
SHORTNESS OF BREATH: 1
CONSTIPATION: 0
LOSS OF CONSCIOUSNESS: 0
EYES NEGATIVE: 1
ABDOMINAL PAIN: 0
PALPITATIONS: 0
NECK PAIN: 0
COUGH: 0
INSOMNIA: 0
NERVOUS/ANXIOUS: 0
CHILLS: 0
FEVER: 0
VOMITING: 0
MYALGIAS: 0

## 2017-08-18 NOTE — PROGRESS NOTES
Subjective:   Hermelinda Mosley is a 67 y.o. female who presents today as a new patient. She was sent by  Marlene in regards to her ongoing chest discomfort associated with breathlessness    She considers herself healthy and active, she joined an exercise program and has been doing it for more than a year. Her  has advanced heart disease and she worries a lot about herself.    She has seen Dr. Benjamin for some time for her reflux. She has Nuñez's esophagus and hiatal hernia and this is bothered her for some time.    However over the course of the last year, she is having more and more heaviness in her chest. This almost exclusively happens at rest. She admits this has worried her greatly, and she backed off on her exerise regimen also, she admits now she is more out of shape, she feels this is what she attributes her breathlessness to    Her Dr. changed her back to omeprazole at higher dose, twice a day. She has had no more chest discomfort but is still winded.  She gets fullness in her chest when she bends over, this is associated with acid in her mouth often.     She also worries that her daughters had factor V Leiden  Uncomplicated stress echo last week, also did her x-ray    Past Medical History   Diagnosis Date   • Hernia of unspecified site of abdominal cavity without mention of obstruction or gangrene repaired   • Vein, varicose stripped   • Screening mammogram 10/30/2008=normal   • Pap smear due   • History of colonoscopy 2005=normal     [facility]. [facility]   • Unspecified hemorrhoids without mention of complication    • Degeneration of lumbar or lumbosacral intervertebral disc    • Abdominal pain, unspecified site    • Diarrhea    • Recurrent UTI 9/4/2009   • Swelling 9/4/2009   • Irritable bowel syndrome    • Esophageal reflux      Past Surgical History   Procedure Laterality Date   • Cholecystectomy     • Hernia repair     • Vein stripping       Family History   Problem Relation Age of  Onset   • Hypertension Mother    • Diabetes Mother      pre diabetes   • Diabetes Father    • Hypertension Father    • Kidney Disease Father      History   Smoking status   • Former Smoker -- 0.25 packs/day for 4 years   • Types: Cigarettes   • Quit date: 01/01/1970   Smokeless tobacco   • Never Used     Comment: only smokes 1-2 cigs a couple times a week for 1-2 years     No Known Allergies  Outpatient Encounter Prescriptions as of 8/18/2017   Medication Sig Dispense Refill   • albuterol 108 (90 BASE) MCG/ACT Aero Soln inhalation aerosol Inhale 2 Puffs by mouth every 6 hours as needed for Shortness of Breath. 8.5 g 3   • Multiple Vitamins-Minerals (MULTIVITAMIN PO) Take  by mouth.     • Cholecalciferol (VITAMIN D PO) Take  by mouth.     • Probiotic Product (PROBIOTIC DAILY PO) Take  by mouth.     • Ascorbic Acid (VITAMIN C PO) Take  by mouth.     • meloxicam (MOBIC) 15 MG tablet Take 1 Tab by mouth every day. 30 Tab 0   • omeprazole (PRILOSEC) 20 MG delayed-release capsule Take 20 mg by mouth every day.     • Diclofenac Sodium 1 % Gel Apply 2gm in a thin layer to joint pain in hands up to 4 times per day if needed 1 Tube 3   • tramadol (ULTRAM) 50 MG TABS Take 1 Tab by mouth every 8 hours as needed (moderate to severe pain). 60 Tab 0     No facility-administered encounter medications on file as of 8/18/2017.     Review of Systems   Constitutional: Positive for malaise/fatigue. Negative for fever and chills.   HENT: Negative for hearing loss.    Eyes: Negative.    Respiratory: Positive for shortness of breath. Negative for cough, sputum production and wheezing.    Cardiovascular: Positive for chest pain and leg swelling. Negative for palpitations.   Gastrointestinal: Positive for heartburn and nausea. Negative for vomiting, abdominal pain, constipation and blood in stool.   Genitourinary: Negative.  Negative for dysuria.   Musculoskeletal: Negative for myalgias, back pain and neck pain.   Neurological: Negative for  "dizziness, loss of consciousness and headaches.   Endo/Heme/Allergies: Does not bruise/bleed easily.   Psychiatric/Behavioral: Negative for depression. The patient is not nervous/anxious and does not have insomnia.    All other systems reviewed and are negative.       Objective:   /64 mmHg  Pulse 81  Ht 1.575 m (5' 2.01\")  Wt 72.122 kg (159 lb)  BMI 29.07 kg/m2  SpO2 96%    Physical Exam   Constitutional: She is oriented to person, place, and time. She appears well-developed and well-nourished.   Moderately anxious, healthy-appearing, overweight   Eyes: EOM are normal. Pupils are equal, round, and reactive to light. No scleral icterus.   Neck: Neck supple. No JVD present.   Cardiovascular: Normal rate and regular rhythm.    Pulmonary/Chest: Breath sounds normal. No respiratory distress.   Abdominal: Bowel sounds are normal. She exhibits no distension.   Musculoskeletal: She exhibits no edema or tenderness.   Neurological: She is alert and oriented to person, place, and time. No cranial nerve deficit. Coordination normal.   Skin: Skin is warm and dry. No rash noted.   Psychiatric: She has a normal mood and affect. Her behavior is normal.       Assessment:     1. Chest pressure         Medical Decision Making:    DATA -   I looked at the images of her stress echo. She did more than average in her exercise capacity treadmill. There are no abnormalities on her echo at all    I also looked at the images of her chest x-ray, normal reassuring     Lipids recently done and are normal as is complete metabolic panel and complete blood count. I do not see genetic markers such as factor V Leiden    Chest discomfort/breathlessness.The differential diagnosis certainly includes underlying pulmonary or heart abnormalities but I think this is much less likely in the setting of her stress test and echo. Reflux/Nuñez's would also be likely    Discussed test results at length, reassurance given  Continue one baby " aspirin  Continue omeprazole  I will call her in one week, if she is still breathless, I would plan to do a CT of the chest to rule out PE, this is less likely in the office after walking her pulse ox stayed close to 100%, did not drop lower on her stress echo. She is not tachycardic  If she has a setback or her symptoms progress, we also discussed the option of coronary angiography and possible percutaneous intervention. She agrees to proceed if warranted    She will also follow-up with gastroenterology in late September  [facility] thereafter based on the above plan

## 2017-09-05 ENCOUNTER — TELEPHONE (OUTPATIENT)
Dept: CARDIOLOGY | Facility: MEDICAL CENTER | Age: 67
End: 2017-09-05

## 2017-09-05 DIAGNOSIS — R06.02 SHORTNESS OF BREATH: ICD-10-CM

## 2017-09-05 DIAGNOSIS — R07.89 CHEST DISCOMFORT: ICD-10-CM

## 2017-09-05 NOTE — TELEPHONE ENCOUNTER
----- Message from Sarai Restrepo M.D. sent at 8/18/2017 12:47 PM PDT -----  Regarding: FT  See my note from August 18, thanks so much

## 2017-09-05 NOTE — TELEPHONE ENCOUNTER
Called patient. She states that she is still having some shortness of breath, but it is better than it was at her appointment.    CT-chest ordered per Dr. Restrepo's office note 8/18/2017. Gave patient Imaging Scheduling phone #[phone].    HUE GODFREY

## 2017-09-12 ENCOUNTER — OFFICE VISIT (OUTPATIENT)
Dept: URGENT CARE | Facility: PHYSICIAN GROUP | Age: 67
End: 2017-09-12
Payer: MEDICARE

## 2017-09-12 ENCOUNTER — HOSPITAL ENCOUNTER (OUTPATIENT)
Dept: RADIOLOGY | Facility: MEDICAL CENTER | Age: 67
End: 2017-09-12
Attending: PHYSICIAN ASSISTANT
Payer: MEDICARE

## 2017-09-12 ENCOUNTER — TELEPHONE (OUTPATIENT)
Dept: URGENT CARE | Facility: PHYSICIAN GROUP | Age: 67
End: 2017-09-12

## 2017-09-12 VITALS
HEIGHT: 66 IN | DIASTOLIC BLOOD PRESSURE: 72 MMHG | SYSTOLIC BLOOD PRESSURE: 130 MMHG | OXYGEN SATURATION: 97 % | WEIGHT: 161.8 LBS | HEART RATE: 81 BPM | TEMPERATURE: 97.8 F | RESPIRATION RATE: 16 BRPM | BODY MASS INDEX: 26 KG/M2

## 2017-09-12 DIAGNOSIS — R60.0 EDEMA OF LEFT LOWER EXTREMITY: ICD-10-CM

## 2017-09-12 PROCEDURE — 93971 EXTREMITY STUDY: CPT | Mod: LT

## 2017-09-12 PROCEDURE — 99214 OFFICE O/P EST MOD 30 MIN: CPT | Performed by: PHYSICIAN ASSISTANT

## 2017-09-12 ASSESSMENT — PAIN SCALES - GENERAL: PAINLEVEL: 4=SLIGHT-MODERATE PAIN

## 2017-09-12 NOTE — TELEPHONE ENCOUNTER
Phone Number Called: [phone] (home)       Message: spoke with patient gave her the information for her ultrasound at [place] 1:45 check in today.  She understood the instructions.    Left Message for patient to call back: no

## 2017-09-12 NOTE — PROGRESS NOTES
Chief Complaint   Patient presents with   • Other     hard bump on her lower leg, since she had her stress test       HISTORY OF PRESENT ILLNESS: Patient is a 67 y.o. female who presents today for the following:    Patient comes in for evaluation pain in her left lower extremity with associated localized swelling. Patient noticed an area on the medial distal aspect of the left lower leg was swollen and painful. She has another area on her left ankle is swollen and painful. She denies any injuries. She does report a history of factor V Leiden mutation. She has been having intermittent shortness of breath and has a CTA chest scheduled for Friday.    Patient Active Problem List    Diagnosis Date Noted   • Chest pressure 06/27/2017   • Epigastric pressure 03/03/2017   • Nuñez's esophagus without dysplasia 10/17/2016   • Varicose veins of legs 10/17/2016   • Primary osteoarthritis involving multiple joints 04/05/2016   • Chronic kidney disease, stage 3 04/05/2016   • Factor V Leiden mutation (CMS-[facility]) 04/05/2016   • Left ankle instability 01/05/2016   • Vitamin D deficiency disease 01/05/2016   • Cervical disc disease 01/05/2016   • Osteopenia 09/10/2013   • Esophageal reflux 07/31/2009   • Degeneration of lumbar or lumbosacral intervertebral disc 07/31/2009   • Irritable bowel syndrome 07/31/2009       Allergies:Review of patient's allergies indicates no known allergies.    Current Outpatient Prescriptions Ordered in [facility]   Medication Sig Dispense Refill   • albuterol 108 (90 BASE) MCG/ACT Aero Soln inhalation aerosol Inhale 2 Puffs by mouth every 6 hours as needed for Shortness of Breath. 8.5 g 3   • Multiple Vitamins-Minerals (MULTIVITAMIN PO) Take  by mouth.     • Cholecalciferol (VITAMIN D PO) Take  by mouth.     • Probiotic Product (PROBIOTIC DAILY PO) Take  by mouth.     • Ascorbic Acid (VITAMIN C PO) Take  by mouth.     • meloxicam (MOBIC) 15 MG tablet Take 1 Tab by mouth every day. 30 Tab 0   • omeprazole  (PRILOSEC) 20 MG delayed-release capsule Take 20 mg by mouth every day.     • Diclofenac Sodium 1 % Gel Apply 2gm in a thin layer to joint pain in hands up to 4 times per day if needed 1 Tube 3   • tramadol (ULTRAM) 50 MG TABS Take 1 Tab by mouth every 8 hours as needed (moderate to severe pain). 60 Tab 0     No current Epic-ordered facility-administered medications on file.        Past Medical History:   Diagnosis Date   • Recurrent UTI 2009   • Swelling 2009   • Abdominal pain, unspecified site    • Degeneration of lumbar or lumbosacral intervertebral disc    • Diarrhea    • Esophageal reflux    • Hernia of unspecified site of abdominal cavity without mention of obstruction or gangrene repaired   • History of colonoscopy =normal    [facility]. [facility]> [facility]   • Irritable bowel syndrome    • Pap smear due   • Screening mammogram 10/30/2008=normal   • Unspecified hemorrhoids without mention of complication    • Vein, varicose stripped       Social History   Substance Use Topics   • Smoking status: Former Smoker     Packs/day: 0.25     Years: 4.00     Types: Cigarettes     Quit date: 1970   • Smokeless tobacco: Never Used      Comment: only smokes 1-2 cigs a couple times a week for 1-2 years   • Alcohol use 0.0 oz/week      Comment: occasional       Family Status   Relation Status   • Mother     97   • Father  at age 47    kidney failure   • Brother Alive    polycythemia     Family History   Problem Relation Age of Onset   • Hypertension Mother    • Diabetes Mother      pre diabetes   • Diabetes Father    • Hypertension Father    • Kidney Disease Father        ROS:   Review of Systems   Constitutional: Negative for fever, chills, weight loss and malaise/fatigue.   HENT: Negative for ear pain, nosebleeds, congestion, sore throat and neck pain.    Eyes: Negative for blurred vision.   Respiratory: Negative for cough, sputum production, and wheezing.    Cardiovascular: Negative for  "chest pain, palpitations, orthopnea and leg swelling.   Gastrointestinal: Negative for heartburn, nausea, vomiting and abdominal pain.   Genitourinary: Negative for dysuria, urgency and frequency.       Exam:  Blood pressure 130/72, pulse 81, temperature 36.6 °C (97.8 °F), resp. rate 16, height 1.664 m (5' 5.5\"), weight 73.4 kg (161 lb 12.8 oz), SpO2 97 %.  General: Well developed, well nourished. No distress.  HEENT:Head is grossly normal.  Pulmonary: No respiratory distress noted.  Cardiovascular: Pedal pulses are strong and equal bilaterally.  Neurologic: No sensory deficit noted.  Extremities: Trace edema noted of the medial distal third of the left lower extremity with associated tenderness. No calf tenderness noted. Full range of normal gait.  Skin: Warm, dry, good turgor. No rashes in visible areas.   Psych: Normal mood. Alert and oriented x3. Judgment and insight is normal.    Assessment/Plan:  Ordered labs for patient's CTA on Friday. Will have ultrasound scheduled as an outpatient this afternoon.  1. Edema of left lower extremity  COMP METABOLIC PANEL    US-EXTREMITY VENOUS UNILATERAL-LOWER LEFT     9/12/17 1530 hrs.  Left a message on patient's voicemail to call the clinic regarding results  Discussed results with Olga (pharmacist at extension [phone])  Recommendation is anti-inflammatories and heat and follow up for repeat ultrasound for any worsening symptoms.  Impression       1.  No evidence of left  lower extremity deep venous thrombosis.    2.  Acute thrombus in the left greater saphenous vein in the lower leg. The thrombus is greater than 5 cm from the common femoral vein. The thrombus extends for greater than 5 cm.      1548  Discussed ultrasound results with patient. Answered all questions. Patient understands to follow-up for any worsening symptoms.  "

## 2017-09-13 ENCOUNTER — HOSPITAL ENCOUNTER (OUTPATIENT)
Dept: LAB | Facility: MEDICAL CENTER | Age: 67
End: 2017-09-13
Attending: PHYSICIAN ASSISTANT
Payer: MEDICARE

## 2017-09-13 DIAGNOSIS — R60.0 EDEMA OF LEFT LOWER EXTREMITY: ICD-10-CM

## 2017-09-13 LAB
ALBUMIN SERPL BCP-MCNC: 4 G/DL (ref 3.2–4.9)
ALBUMIN/GLOB SERPL: 1.4 G/DL
ALP SERPL-CCNC: 57 U/L (ref 30–99)
ALT SERPL-CCNC: 19 U/L (ref 2–50)
ANION GAP SERPL CALC-SCNC: 7 MMOL/L (ref 0–11.9)
AST SERPL-CCNC: 19 U/L (ref 12–45)
BILIRUB SERPL-MCNC: 0.6 MG/DL (ref 0.1–1.5)
BUN SERPL-MCNC: 14 MG/DL (ref 8–22)
CALCIUM SERPL-MCNC: 9.6 MG/DL (ref 8.5–10.5)
CHLORIDE SERPL-SCNC: 105 MMOL/L (ref 96–112)
CO2 SERPL-SCNC: 27 MMOL/L (ref 20–33)
CREAT SERPL-MCNC: 1.03 MG/DL (ref 0.5–1.4)
GFR SERPL CREATININE-BSD FRML MDRD: 53 ML/MIN/1.73 M 2
GLOBULIN SER CALC-MCNC: 2.9 G/DL (ref 1.9–3.5)
GLUCOSE SERPL-MCNC: 83 MG/DL (ref 65–99)
POTASSIUM SERPL-SCNC: 4.2 MMOL/L (ref 3.6–5.5)
PROT SERPL-MCNC: 6.9 G/DL (ref 6–8.2)
SODIUM SERPL-SCNC: 139 MMOL/L (ref 135–145)

## 2017-09-13 PROCEDURE — 36415 COLL VENOUS BLD VENIPUNCTURE: CPT

## 2017-09-13 PROCEDURE — 80053 COMPREHEN METABOLIC PANEL: CPT

## 2017-09-14 ENCOUNTER — HOSPITAL ENCOUNTER (OUTPATIENT)
Dept: RADIOLOGY | Facility: MEDICAL CENTER | Age: 67
End: 2017-09-14
Attending: FAMILY MEDICINE
Payer: MEDICARE

## 2017-09-14 ENCOUNTER — TELEPHONE (OUTPATIENT)
Dept: MEDICAL GROUP | Facility: PHYSICIAN GROUP | Age: 67
End: 2017-09-14

## 2017-09-14 DIAGNOSIS — I82.812 SUPERFICIAL THROMBOSIS OF LEFT LOWER EXTREMITY: ICD-10-CM

## 2017-09-14 DIAGNOSIS — E88.01 LOW PLASMA ALPHA-1 ANTITRYPSIN (HCC): ICD-10-CM

## 2017-09-14 PROCEDURE — 93971 EXTREMITY STUDY: CPT | Mod: LT

## 2017-09-14 NOTE — TELEPHONE ENCOUNTER
Please help schedule 48 hour and 1 week follow up of US of left leg to evaluate progression of superficial thrombosis seen on 9/12/17.   Could see if we can use an urgent care radiology US slot per Stormy.  Thank you!!!!  Maribel Long M.D.

## 2017-09-15 ENCOUNTER — TELEPHONE (OUTPATIENT)
Dept: CARDIOLOGY | Facility: MEDICAL CENTER | Age: 67
End: 2017-09-15

## 2017-09-15 ENCOUNTER — TELEPHONE (OUTPATIENT)
Dept: URGENT CARE | Facility: PHYSICIAN GROUP | Age: 67
End: 2017-09-15

## 2017-09-15 ENCOUNTER — HOSPITAL ENCOUNTER (OUTPATIENT)
Dept: LAB | Facility: MEDICAL CENTER | Age: 67
End: 2017-09-15
Attending: FAMILY MEDICINE
Payer: MEDICARE

## 2017-09-15 ENCOUNTER — HOSPITAL ENCOUNTER (OUTPATIENT)
Dept: RADIOLOGY | Facility: MEDICAL CENTER | Age: 67
End: 2017-09-15
Attending: INTERNAL MEDICINE
Payer: MEDICARE

## 2017-09-15 DIAGNOSIS — I26.99 PE (PULMONARY THROMBOEMBOLISM) (HCC): ICD-10-CM

## 2017-09-15 DIAGNOSIS — E88.01 LOW PLASMA ALPHA-1 ANTITRYPSIN (HCC): ICD-10-CM

## 2017-09-15 DIAGNOSIS — R06.02 SHORTNESS OF BREATH: ICD-10-CM

## 2017-09-15 DIAGNOSIS — R07.89 CHEST DISCOMFORT: ICD-10-CM

## 2017-09-15 DIAGNOSIS — R91.8 MULTIPLE PULMONARY NODULES: ICD-10-CM

## 2017-09-15 DIAGNOSIS — I82.812 SUPERFICIAL THROMBOSIS OF LEFT LOWER EXTREMITY: ICD-10-CM

## 2017-09-15 DIAGNOSIS — I26.99 OTHER ACUTE PULMONARY EMBOLISM: ICD-10-CM

## 2017-09-15 LAB
INR PPP: 1.95 (ref 0.87–1.13)
PROTHROMBIN TIME: 22.8 SEC (ref 12–14.6)

## 2017-09-15 PROCEDURE — 36415 COLL VENOUS BLD VENIPUNCTURE: CPT

## 2017-09-15 PROCEDURE — 71275 CT ANGIOGRAPHY CHEST: CPT

## 2017-09-15 PROCEDURE — 700117 HCHG RX CONTRAST REV CODE 255: Performed by: INTERNAL MEDICINE

## 2017-09-15 PROCEDURE — 82103 ALPHA-1-ANTITRYPSIN TOTAL: CPT

## 2017-09-15 PROCEDURE — 85303 CLOT INHIBIT PROT C ACTIVITY: CPT

## 2017-09-15 PROCEDURE — 85610 PROTHROMBIN TIME: CPT

## 2017-09-15 PROCEDURE — 81241 F5 GENE: CPT

## 2017-09-15 RX ADMIN — IOHEXOL 62 ML: 350 INJECTION, SOLUTION INTRAVENOUS at 08:14

## 2017-09-15 NOTE — TELEPHONE ENCOUNTER
Discussed below case with Dr. Bloch.    Patient was started on Xarelto 15 mg twice daily 9/12/17. Patient understands to take this for 3 weeks and will be switched to 20 mg daily for approximately 6 weeks total of anticoagulation. Patient to have a repeat ultrasound at 48 hours and one week from original imaging. Ultrasounds will be coordinated by her primary care provider as will be the change of dosing to the 20 mg dose. Discussed everything with the patient on the phone. Patient verbalizes understanding and agrees with plan of care. Patient's primary care provider will assume care from this point.

## 2017-09-15 NOTE — TELEPHONE ENCOUNTER
Sarai Restrepo M.D.   You 4 minutes ago (9:04 AM)      Can you please get her consultation with pulmonary in regards to her abnormal CT and shortness of breath (Routing comment)      =================================================    Referral to pulmonology initiated.    HUE GODFREY

## 2017-09-15 NOTE — TELEPHONE ENCOUNTER
Discussed anticoagulation referral with the patient. Patient will cancel her ultrasound scheduled for next week.

## 2017-09-15 NOTE — TELEPHONE ENCOUNTER
Referral to pulmonology   Randolph Sheffield   Sent: Fri September 15, 2017  9:33 AM           Message     The urgent referral to pulmonology has been forwarded to the patient contact center.   The contact number is [phone]   ===============================================    Called patient and advised her of the above information.    HUE RN

## 2017-09-15 NOTE — TELEPHONE ENCOUNTER
I reviewed her CAT scan  She has a small subsegmental pulmonary blisters  I left a message that she should stay on anticoagulation as prescribed by the Coumadin clinic, at least 6 months    The other point is that she does have intrinsic lung disease  This may be causing her symptoms/breathlessness    I will arrange a consult with pulmonary if she hasn't seen them already

## 2017-09-15 NOTE — TELEPHONE ENCOUNTER
Reviewed Dr. Restrepo's message regarding patient's PE.    Referring patient to the anticoagulation clinic for further management. Patient is currently on xarelto 15 mg twice daily.

## 2017-09-17 ENCOUNTER — TELEPHONE (OUTPATIENT)
Dept: MEDICAL GROUP | Facility: PHYSICIAN GROUP | Age: 67
End: 2017-09-17

## 2017-09-17 LAB
A1AT SERPL-MCNC: 84 MG/DL (ref 90–200)
PROT C ACT/NOR PPP: 210 % (ref 83–168)

## 2017-09-18 ENCOUNTER — APPOINTMENT (OUTPATIENT)
Dept: PULMONOLOGY | Facility: HOSPICE | Age: 67
End: 2017-09-18
Payer: MEDICARE

## 2017-09-18 ENCOUNTER — OFFICE VISIT (OUTPATIENT)
Dept: PULMONOLOGY | Facility: HOSPICE | Age: 67
End: 2017-09-18
Payer: MEDICARE

## 2017-09-18 VITALS
OXYGEN SATURATION: 95 % | DIASTOLIC BLOOD PRESSURE: 70 MMHG | TEMPERATURE: 97.2 F | HEIGHT: 62 IN | WEIGHT: 162.2 LBS | HEART RATE: 111 BPM | SYSTOLIC BLOOD PRESSURE: 126 MMHG | BODY MASS INDEX: 29.85 KG/M2 | RESPIRATION RATE: 16 BRPM

## 2017-09-18 DIAGNOSIS — D68.51 FACTOR 5 LEIDEN MUTATION, HETEROZYGOUS (HCC): ICD-10-CM

## 2017-09-18 DIAGNOSIS — I82.90 THROMBOSIS: ICD-10-CM

## 2017-09-18 DIAGNOSIS — I26.99 PULMONARY EMBOLISM, OTHER: ICD-10-CM

## 2017-09-18 DIAGNOSIS — R06.02 SOB (SHORTNESS OF BREATH): ICD-10-CM

## 2017-09-18 PROCEDURE — 99204 OFFICE O/P NEW MOD 45 MIN: CPT | Performed by: INTERNAL MEDICINE

## 2017-09-18 RX ORDER — AZITHROMYCIN 250 MG/1
TABLET, FILM COATED ORAL
Qty: 6 TAB | Refills: 0 | Status: SHIPPED | OUTPATIENT
Start: 2017-09-18 | End: 2017-12-19

## 2017-09-18 NOTE — TELEPHONE ENCOUNTER
----- Message from Rozina Stroud P.A.-C. sent at 9/15/2017  2:35 PM PDT -----  Regarding: RE: small PE  Perfect. Referral has been placed. She is going to cancel the last US.    Thanks for your help!  Petrona    ----- Message -----  From: Michael J Bloch, M.D.  Sent: 9/15/2017   1:45 PM  To: Maribel Long M.D., #  Subject: small PE                                         Rozina:    Not sure if you saw, but it looks like she has a small PE.  I would probably continue her anticoag for at least 3 months (glad we decided to start it).    As such, I don't think she needs another duplex at present.      I would also recommend that you refer her to the anticoagulation clinic to make sure she understands risks and benefits of anticoagulation, assess adherence, etc.    Bloch  ----- Message -----  From: Maribel Long M.D.  Sent: 9/14/2017   7:32 AM  To: Rozina Stroud P.A.-C., #  Subject: RE: superficila thrombosis                       Stormy updated me on plan for Hermelinda Mosley.    48hr and 1 week follow up US ordered also in addition to chest CT. My MA is working with her to get these scheduled.   Labs ordered for protein C&S, Factor V, PT/INR, repeat of alpha 1 antitripsyn.  She was given 3 weeks sample of xarelto. Left a vm for her reminding her to pick this up.     Hx: Reports personal hx of factor V Leiden and thrombophlebitis with one of her pregnancies. No other hx of DVT noted in EMR    Reports daughters with alpha 1 antitrypsin deficiency and son with hemochromatosis.    Maribel Long M.D.        ----- Message -----  From: Michael J Bloch, M.D.  Sent: 9/13/2017   1:52 PM  To: Maribel Long M.D., #  Subject: RE: superficila thrombosis                       Just to clarify -   No previous h/o DVT, correct?  Does she have a personal history or a family history of factor V.    Options for treatment of her superficial thrombosis are:  1) Short course of anticoagulation - around 6 weeks.  2) nsaids, heat and  repeat duplex at 48 hours and again at one week to make sure that there is not progression.    It looks like patient complained of SOB when she saw her cardiologist earlier this week.      She is scheduled for a CT on friday.    Given this, assuming that she is at low risk for bleeding, I would generally favor anticoagulation at least until we have results of her CT and f/u duplex. Of course, patient preference is important as well.    Rozina (or Maribel) please give me a call at [phone] to discuss.    Thanks  Bloch  ----- Message -----  From: Olga Juárez, PharmD  Sent: 9/12/2017   3:28 PM  To: Michael J Bloch, M.D.  Subject: superficila thrombosis                           Dr. Bloch,    Meg Stroud called today regarding this pt. Pt has hx of factor V and today was diagnosed with acute thrombus in the left greater saphenous vein in the lower leg. The thrombus is greater than 5 cm from the common femoral vein. The thrombus extends for greater than 5 cm.     I told her we typically do not anticoagulate superficial clots > 5 cm from deep vein, tx with NSAID and warm compress. Do you agree?     Thanks,    Olga Espinoza

## 2017-09-18 NOTE — TELEPHONE ENCOUNTER
Thank you Petrona and Dr. Bloch for the great coordination of care!   I see her on Sept 28th, will follow the plan of switching her to 20mg xarelto daily on October 6th and continuing for total 3 months for treatment of the superficial clot and small PE.   She also has a follow up with Dr. Bray with pulmonology and Dr Restrepo.   Thank you!  Maribel

## 2017-09-19 ENCOUNTER — HOSPITAL ENCOUNTER (OUTPATIENT)
Dept: RADIOLOGY | Facility: MEDICAL CENTER | Age: 67
End: 2017-09-19
Attending: INTERNAL MEDICINE
Payer: MEDICARE

## 2017-09-19 ENCOUNTER — HOSPITAL ENCOUNTER (OUTPATIENT)
Dept: LAB | Facility: MEDICAL CENTER | Age: 67
End: 2017-09-19
Attending: INTERNAL MEDICINE
Payer: MEDICARE

## 2017-09-19 ENCOUNTER — TELEPHONE (OUTPATIENT)
Dept: PULMONOLOGY | Facility: HOSPICE | Age: 67
End: 2017-09-19

## 2017-09-19 DIAGNOSIS — I26.99 PULMONARY EMBOLISM, OTHER: ICD-10-CM

## 2017-09-19 PROCEDURE — 82103 ALPHA-1-ANTITRYPSIN TOTAL: CPT

## 2017-09-19 PROCEDURE — 93970 EXTREMITY STUDY: CPT

## 2017-09-19 PROCEDURE — 82104 ALPHA-1-ANTITRYPSIN PHENO: CPT

## 2017-09-19 PROCEDURE — 36415 COLL VENOUS BLD VENIPUNCTURE: CPT

## 2017-09-19 PROCEDURE — 93970 EXTREMITY STUDY: CPT | Mod: 26 | Performed by: SURGERY

## 2017-09-19 NOTE — TELEPHONE ENCOUNTER
Julia maldonado/ roxy flannery called to inform Dr. Montano of the pt's Lower Extremity DVT study:  No DVT found but there is still STV in the left calf.     Last OV: 9/18/17- Dr. Montano   Next OV: 11/28/17  Pulmonary embolism    Dr. Montano is attached to this message for she saw the pt yesterday.

## 2017-09-19 NOTE — PROGRESS NOTES
Chief Complaint   Patient presents with   • Establish Care     New patient is here for Pulmonary Nodules       HPI: This patient is a 67 y.o. Female who is referred for abnormal chest CAT scan. She was in her usual state of good health until May 2017 when she noted mild exertional dyspnea and chest discomfort. She saw her cardiologist and underwent an exercise treadmill test which was normal. Following her treadmill test, she noted left leg edema, and underwent duplex ultrasound showing an acute thrombus of the left greater saphenous vein. She subsequently had a chest CAT scan performed September 15, 2017 confirming a small right lower lobe pulmonary embolism. Additionally there was associated scattered groundglass opacities, and tree-in-bud nodularity. She denies hemoptysis, sputum, fevers or chills. She has had a nonproductive cough. She has a history of factor V Leiden deficiency, had superficial phlebitis in 1976, however and denies any history of deep venous thrombosis or prior pulmonary embolism. She was started on Xarelto, and has noted increased streaking in her left leg.  There is a family history of alpha-1 antitrypsin deficiency however the patient herself is unaware of any pulmonary disease.    Past Medical History:   Diagnosis Date   • Recurrent UTI 9/4/2009   • Swelling 9/4/2009   • Abdominal pain, unspecified site    • Alpha 1-antitrypsin PiMS phenotype    • Back pain    • Bronchitis    • Chickenpox    • Degeneration of lumbar or lumbosacral intervertebral disc    • Diarrhea    • DVT (deep venous thrombosis) (CMS-[facility])    • Esophageal reflux    • Armenian measles    • Hernia of unspecified site of abdominal cavity without mention of obstruction or gangrene repaired   • History of colonoscopy 2005=normal    Dr. Benjamin> [facility]   • Influenza    • Irritable bowel syndrome    • Mumps    • Pap smear due   • Pneumonia    • Restless leg syndrome    • Screening mammogram 10/30/2008=normal   •  Tonsillitis    • Unspecified hemorrhoids without mention of complication    • Vein, varicose stripped       Social History     Social History   • Marital status:      Spouse name: N/A   • Number of children: N/A   • Years of education: N/A     Occupational History   • Not on file.     Social History Main Topics   • Smoking status: Former Smoker     Packs/day: 0.25     Years: 4.00     Types: Cigarettes     Quit date: 1/1/1969   • Smokeless tobacco: Never Used      Comment: only smokes 1-2 cigs a couple times a week for 1-2 years   • Alcohol use 0.0 - 1.2 oz/week      Comment: occasional   • Drug use: No   • Sexual activity: Yes     Partners: Male      Comment:      Other Topics Concern   • Not on file     Social History Narrative   • No narrative on file       Family History   Problem Relation Age of Onset   • Hypertension Mother    • Diabetes Mother      pre diabetes   • Diabetes Father    • Hypertension Father    • Kidney Disease Father    • Respiratory Disease Brother        Current Outpatient Prescriptions on File Prior to Visit   Medication Sig Dispense Refill   • albuterol 108 (90 BASE) MCG/ACT Aero Soln inhalation aerosol Inhale 2 Puffs by mouth every 6 hours as needed for Shortness of Breath. 8.5 g 3   • Multiple Vitamins-Minerals (MULTIVITAMIN PO) Take  by mouth.     • Cholecalciferol (VITAMIN D PO) Take  by mouth.     • Probiotic Product (PROBIOTIC DAILY PO) Take  by mouth.     • Ascorbic Acid (VITAMIN C PO) Take 500 mg by mouth every day.     • omeprazole (PRILOSEC) 20 MG delayed-release capsule Take 20 mg by mouth every day.     • meloxicam (MOBIC) 15 MG tablet Take 1 Tab by mouth every day. 30 Tab 0   • Diclofenac Sodium 1 % Gel Apply 2gm in a thin layer to joint pain in hands up to 4 times per day if needed 1 Tube 3   • tramadol (ULTRAM) 50 MG TABS Take 1 Tab by mouth every 8 hours as needed (moderate to severe pain). 60 Tab 0     No current facility-administered medications on file prior  "to visit.        Allergies: Review of patient's allergies indicates no known allergies.    ROS:   Constitutional: Denies fevers, chills, night sweats, fatigue or weight loss  Eyes: Denies vision loss, pain, drainage, double vision  Ears, Nose, Throat: Denies earache, difficulty hearing, tinnitus, nasal congestion, hoarseness  Cardiovascular: Denies chest pain, tightness, palpitations, orthopnea, +LE edema  Respiratory:As in history of present illness  Sleep: Denies daytime sleepiness, snoring, apneas, insomnia, morning headaches  GI: Denies heartburn, dysphagia, nausea, abdominal pain, diarrhea or constipation  : Denies frequent urination, hematuria, discharge or painful urination  Musculoskeletal: Denies back pain, painful joints, sore muscles  Neurological: Denies weakness or headaches  Skin: No rashes    Blood pressure 126/70, pulse (!) 111, temperature 36.2 °C (97.2 °F), resp. rate 16, height 1.575 m (5' 2\"), weight 73.6 kg (162 lb 3.2 oz), SpO2 95 %.    Physical Exam:  Appearance: Well-nourished, well-developed, in no acute distress  HEENT: Normocephalic, atraumatic, white sclera, PERRLA, oropharynx clear  Neck: No adenopathy or masses  Respiratory: no intercostal retractions or accessory muscle use  Lungs auscultation: Clear to auscultation bilaterally  Cardiovascular: Regular rate rhythm. No murmurs, rubs or gallops.  LLE edema with erythematous streaking  Abdomen: soft, nondistended  Gait: Normal  Digits: No clubbing, cyanosis  Motor: No focal deficits  Orientation: Oriented to time, person and place    Diagnosis:  1. Pulmonary embolism, other  AMB PULMONARY FUNCTION TEST/LAB    ALPHA-1 ANTITRYPSIN PHENOTYPE    LE VENOUS DUPLEX    azithromycin (ZITHROMAX) 250 MG Tab   2. Thrombosis     3. SOB (shortness of breath)     4. Factor 5 Leiden mutation, heterozygous (CMS-[facility])         Plan:  The patient has acute pulmonary embolism, was appropriately started on Xarelto. She is establishing with anticoagulation " clinic. She has underlying factor V Leiden deficiency.  She has had increased edema in lower left lower extremity with streaking, and we will update her duplex ultrasound immediately.   She has also developed a nonproductive cough, with chest CAT scan showing scattered groundglass opacities, which may be secondary to pulmonary thromboembolism, infection, less likely hypersensitivity pneumonitis or interstitial lung disease.  Recommend Z-Dread empirically.  Obtain alpha-1 antitrypsin phenotype and level.  Obtain full pulmonary function testing with DLCO.  Return for after other testing.

## 2017-09-20 LAB — F5 P.R506Q BLD/T QL: ABNORMAL

## 2017-09-21 ENCOUNTER — TELEPHONE (OUTPATIENT)
Dept: MEDICAL GROUP | Facility: PHYSICIAN GROUP | Age: 67
End: 2017-09-21

## 2017-09-21 DIAGNOSIS — D68.51 HETEROZYGOUS FACTOR V LEIDEN MUTATION (HCC): ICD-10-CM

## 2017-09-21 NOTE — TELEPHONE ENCOUNTER
Lab confirms heterozygous mutation of Factor V leiden.   Referred to hematology for further evaluation and testing per patient request.    Talked with patient and updated her on most recent US results ordered by Dr. Montano 9/19 showing no DVT b/l and the subacute superficial clot on LLE.   Maribel

## 2017-09-22 ENCOUNTER — ANTICOAGULATION VISIT (OUTPATIENT)
Dept: MEDICAL GROUP | Facility: PHYSICIAN GROUP | Age: 67
End: 2017-09-22
Payer: MEDICARE

## 2017-09-22 ENCOUNTER — TELEPHONE (OUTPATIENT)
Dept: VASCULAR LAB | Facility: MEDICAL CENTER | Age: 67
End: 2017-09-22

## 2017-09-22 VITALS — DIASTOLIC BLOOD PRESSURE: 80 MMHG | SYSTOLIC BLOOD PRESSURE: 130 MMHG | HEART RATE: 81 BPM

## 2017-09-22 DIAGNOSIS — Z79.01 LONG TERM (CURRENT) USE OF ANTICOAGULANTS: ICD-10-CM

## 2017-09-22 DIAGNOSIS — D68.51 FACTOR V LEIDEN MUTATION (HCC): ICD-10-CM

## 2017-09-22 PROBLEM — I26.99 PULMONARY EMBOLISM (HCC): Status: ACTIVE | Noted: 2017-09-22

## 2017-09-22 LAB
A1AT PHENOTYP SERPL-IMP: ABNORMAL
A1AT SERPL-MCNC: 78 MG/DL (ref 90–200)
INR PPP: 1.2 (ref 2–3.5)

## 2017-09-22 PROCEDURE — 85610 PROTHROMBIN TIME: CPT | Performed by: NURSE PRACTITIONER

## 2017-09-22 NOTE — TELEPHONE ENCOUNTER
Initial anticoag note and most recent pulmonary clinic note reviewed.  Patient started on anticoagulation for superficial vein thrombosis and small PE.   Patient with known h/o factor V leiden, but no documented h/o DVT/PE  Does not appear to have been provoked by transient risk factor    Will continue with at least 3 months of anticoagulation and then discuss whether or not to continue anticoagulation and/or perform further w/u with pcp and pulmonary    Michael Bloch, MD  Anticoagulation Clinic    Cc:    MARIA ELENA Montano

## 2017-09-22 NOTE — PROGRESS NOTES
Anticoagulation Summary  As of 9/22/2017    INR goal:      TTR:   --   Today's INR:   1.2   Maintenance plan:   No maintenance plan   Plan last modified:   Fernando Charles, PharmD (9/22/2017)   Next INR check:   10/13/2017   Target end date:       Indications    Factor V Leiden mutation (CMS-HCC) [D68.51]  Pulmonary embolism (CMS-HCC) [I26.99] [I26.99]  Long term (current) use of anticoagulants [Z79.01] [Z79.01]             Anticoagulation Episode Summary     INR check location:       Preferred lab:       Send INR reminders to:       Comments:   Xarelto       Anticoagulation Care Providers     Provider Role Specialty Phone number    [facility] Responsible  [phone]    Maribel Long M.D. Responsible Family Medicine [phone]        Anticoagulation Patient Findings     Lab Results   Component Value Date/Time    SODIUM 139 09/13/2017 09:20 AM    POTASSIUM 4.2 09/13/2017 09:20 AM    CHLORIDE 105 09/13/2017 09:20 AM    CO2 27 09/13/2017 09:20 AM    GLUCOSE 83 09/13/2017 09:20 AM    BUN 14 09/13/2017 09:20 AM    CREATININE 1.03 09/13/2017 09:20 AM    CREATININE 0.96 12/30/2009 11:55 AM    BUNCREATRAT 20 12/30/2009 11:55 AM    GLOMRATE 59 (L) 12/30/2009 11:55 AM      Current Outpatient Prescriptions on File Prior to Visit   Medication Sig Dispense Refill   • OMEGA-3 KRILL OIL PO Take 1 tablet by mouth every day.     • Lifitegrast (XIIDRA) 5 % Solution 1 Drop by Ophthalmic route 2 Times a Day.     • azithromycin (ZITHROMAX) 250 MG Tab Take 2 tablets on day 1, then take 1 tablet a day for 4 days. 6 Tab 0   • albuterol 108 (90 BASE) MCG/ACT Aero Soln inhalation aerosol Inhale 2 Puffs by mouth every 6 hours as needed for Shortness of Breath. 8.5 g 3   • Multiple Vitamins-Minerals (MULTIVITAMIN PO) Take  by mouth.     • Cholecalciferol (VITAMIN D PO) Take  by mouth.     • Probiotic Product (PROBIOTIC DAILY PO) Take  by mouth.     • Ascorbic Acid (VITAMIN C PO) Take 500 mg by mouth every day.      • meloxicam (MOBIC) 15 MG tablet Take 1 Tab by mouth every day. 30 Tab 0   • omeprazole (PRILOSEC) 20 MG delayed-release capsule Take 20 mg by mouth every day.     • Diclofenac Sodium 1 % Gel Apply 2gm in a thin layer to joint pain in hands up to 4 times per day if needed 1 Tube 3   • tramadol (ULTRAM) 50 MG TABS Take 1 Tab by mouth every 8 hours as needed (moderate to severe pain). 60 Tab 0     No current facility-administered medications on file prior to visit.        Hermelinda Mosley referred to the [facility] for small nonocclusive right lower lobe pulmonary embolus, Factor V leiden and Subacute superficial venous thrombosis. Started to get SOB in April 2017 but no notable provoking events.  She was started on Xarelto 15mg bid for 21 days and then will start 20mg once daily. She also has a appt with Hem and pulmonary function testing in the hear future. Looks like Dr. Bloch and PCP have recommended about 3 months of therapy initially.      Pt gave verbal consent  to leave a  with detailed medical information regarding INR and dose of warfarin.    Pt tolerating medication well, no s/s of bleeding.     Med rec done with pt (see above),   Mobic but only uses it PRN, and none over the last few weeks. She will not use it if possible.   She will also stop KRILL OIL.      Pt education done for Xarelto.     Follow up appointment in 3 week(s) with labs     Fernando Charles, PharmD Bloch

## 2017-09-26 ENCOUNTER — NON-PROVIDER VISIT (OUTPATIENT)
Dept: PULMONOLOGY | Facility: HOSPICE | Age: 67
End: 2017-09-26
Payer: MEDICARE

## 2017-09-26 DIAGNOSIS — I26.99 PULMONARY EMBOLISM, OTHER: ICD-10-CM

## 2017-09-26 PROCEDURE — 94729 DIFFUSING CAPACITY: CPT | Performed by: INTERNAL MEDICINE

## 2017-09-26 PROCEDURE — 94060 EVALUATION OF WHEEZING: CPT | Performed by: INTERNAL MEDICINE

## 2017-09-26 PROCEDURE — 94726 PLETHYSMOGRAPHY LUNG VOLUMES: CPT | Performed by: INTERNAL MEDICINE

## 2017-09-26 ASSESSMENT — PULMONARY FUNCTION TESTS
FEV1/FVC: 81.49
FVC: 3.73
FEV1/FVC: 80
FEV1_PERCENT_PREDICTED: 136
FEV1/FVC_PERCENT_PREDICTED: 76
FEV1/FVC_PERCENT_PREDICTED: 108
FEV1/FVC_PERCENT_CHANGE: 0
FEV1_PERCENT_CHANGE: -2
FEV1_PREDICTED: 2.16
FEV1_PERCENT_CHANGE: 0
FEV1: 2.97
FVC_PERCENT_PREDICTED: 126
FEV1/FVC_PERCENT_PREDICTED: 105
FVC_PREDICTED: 2.85
FEV1: 2.95
FVC_PERCENT_PREDICTED: 130
FVC: 3.62
FEV1_PERCENT_PREDICTED: 137

## 2017-09-26 NOTE — PROCEDURES
Good patient effort & cooperation.  The results of this test meet the ATS/ERS standards for acceptability and repeatability.  A bronchodilator of Ventolin HFA- 2puffs via spacer were administered.  The DLCO was uncorrected for Hgb.    The FVC 3.62 L or 126%, FEV1 is 2.95 L or 136%, FEV1/FVC 82%. %. DLCO 139%. No bronchodilator response.    Interpretation:  Normal pulmonary function.  Normal flow volume loop.  Normal gas transfer.

## 2017-09-28 ENCOUNTER — OFFICE VISIT (OUTPATIENT)
Dept: MEDICAL GROUP | Facility: PHYSICIAN GROUP | Age: 67
End: 2017-09-28
Payer: MEDICARE

## 2017-09-28 VITALS
HEIGHT: 62 IN | HEART RATE: 72 BPM | WEIGHT: 160.2 LBS | BODY MASS INDEX: 29.48 KG/M2 | SYSTOLIC BLOOD PRESSURE: 130 MMHG | RESPIRATION RATE: 20 BRPM | DIASTOLIC BLOOD PRESSURE: 80 MMHG | TEMPERATURE: 99.6 F | OXYGEN SATURATION: 97 %

## 2017-09-28 DIAGNOSIS — D68.51 FACTOR V LEIDEN MUTATION (HCC): ICD-10-CM

## 2017-09-28 DIAGNOSIS — H53.9 VISION CHANGES: ICD-10-CM

## 2017-09-28 DIAGNOSIS — I26.99 OTHER ACUTE PULMONARY EMBOLISM WITHOUT ACUTE COR PULMONALE (HCC): ICD-10-CM

## 2017-09-28 PROCEDURE — 99214 OFFICE O/P EST MOD 30 MIN: CPT | Performed by: FAMILY MEDICINE

## 2017-09-28 NOTE — ASSESSMENT & PLAN NOTE
Blurry vision and feeling of pressure behind her left eye recently.   She is worried due to having recent small PE and superficial blood clot in Left Leg currently on xarelto. She has no vision loss, was evaluated by her optometrist. Will place referral to ophthalmology for further evaluation including visual field testing to rule out blood clot in vasculature of eye.

## 2017-09-28 NOTE — ASSESSMENT & PLAN NOTE
67F presents for follow up after small PE found when CT chest was ordered by her cardiologist Dr. Restrepo. She had a normal stress test.   She has Factor V Leiden mutation with no hx of DVTs she had a superficial thrombophebilits in the past and a recent left LE superficial thrombophlebitis. She had repeat US of bilateral LE with no DVT and no progression of the superficial clot. She was started on xarelto 15 bid on 9/12/17 Will switch to 20mg once on October 6th after 3 weeks.   a day for total at least 3 months possibly 6 months.   She still has shortness of breath.

## 2017-09-28 NOTE — ASSESSMENT & PLAN NOTE
Patient is heterozygous for Factor V leiden. She also has two daughters who are positive.   She has no hx of DVT but recent left LE superficial clot and small PE found on CT done for shortness of breath.   She is on xarelto now will continue for at least 3 months.   She has an appointment coming up with hematololgy to further evaluate.

## 2017-09-29 ENCOUNTER — ANTICOAGULATION MONITORING (OUTPATIENT)
Dept: MEDICAL GROUP | Facility: PHYSICIAN GROUP | Age: 67
End: 2017-09-29

## 2017-09-29 DIAGNOSIS — I26.99 OTHER ACUTE PULMONARY EMBOLISM WITHOUT ACUTE COR PULMONALE (HCC): ICD-10-CM

## 2017-09-29 DIAGNOSIS — Z79.01 LONG TERM (CURRENT) USE OF ANTICOAGULANTS: ICD-10-CM

## 2017-09-29 DIAGNOSIS — D68.51 FACTOR V LEIDEN MUTATION (HCC): ICD-10-CM

## 2017-10-02 NOTE — PROGRESS NOTES
Subjective:   Hermelinda Mosley is a 67 y.o. female here today for evaluation and management of:     Pulmonary embolism (CMS-HCC) [I26.99]  67F presents for follow up after small PE found when CT chest was ordered by her cardiologist Dr. Restrepo. She had a normal stress test.   She has Factor V Leiden mutation with no hx of DVTs she had a superficial thrombophebilits in the past and a recent left LE superficial thrombophlebitis. She had repeat US of bilateral LE with no DVT and no progression of the superficial clot. She was started on xarelto 15 bid on 9/12/17 Will switch to 20mg once on October 6th after 3 weeks.   a day for total at least 3 months possibly 6 months.   She still has shortness of breath.     Factor V Leiden mutation (CMS-HCC)  Patient is heterozygous for Factor V leiden. She also has two daughters who are positive.   She has no hx of DVT but recent left LE superficial clot and small PE found on CT done for shortness of breath.   She is on xarelto now will continue for at least 3 months.   She has an appointment coming up with hematololgy to further evaluate.         Vision changes  Blurry vision and feeling of pressure behind her left eye recently.   She is worried due to having recent small PE and superficial blood clot in Left Leg currently on xarelto. She has no vision loss, was evaluated by her optometrist. Will place referral to ophthalmology for further evaluation including visual field testing to rule out blood clot in vasculature of eye.          Current medicines (including changes today)  Current Outpatient Prescriptions   Medication Sig Dispense Refill   • rivaroxaban (XARELTO) 20 MG Tab tablet Take 1 Tab by mouth with dinner. 90 Tab 1   • OMEGA-3 KRILL OIL PO Take 1 tablet by mouth every day.     • Lifitegrast (XIIDRA) 5 % Solution 1 Drop by Ophthalmic route 2 Times a Day.     • azithromycin (ZITHROMAX) 250 MG Tab Take 2 tablets on day 1, then take 1 tablet a day for 4 days. 6 Tab 0  "  • albuterol 108 (90 BASE) MCG/ACT Aero Soln inhalation aerosol Inhale 2 Puffs by mouth every 6 hours as needed for Shortness of Breath. 8.5 g 3   • Multiple Vitamins-Minerals (MULTIVITAMIN PO) Take  by mouth.     • Cholecalciferol (VITAMIN D PO) Take  by mouth.     • Probiotic Product (PROBIOTIC DAILY PO) Take  by mouth.     • Ascorbic Acid (VITAMIN C PO) Take 500 mg by mouth every day.     • meloxicam (MOBIC) 15 MG tablet Take 1 Tab by mouth every day. 30 Tab 0   • omeprazole (PRILOSEC) 20 MG delayed-release capsule Take 20 mg by mouth every day.     • Diclofenac Sodium 1 % Gel Apply 2gm in a thin layer to joint pain in hands up to 4 times per day if needed 1 Tube 3   • tramadol (ULTRAM) 50 MG TABS Take 1 Tab by mouth every 8 hours as needed (moderate to severe pain). 60 Tab 0     No current facility-administered medications for this visit.      She  has a past medical history of Abdominal pain, unspecified site; Alpha 1-antitrypsin PiMS phenotype; Back pain; Bronchitis; Chickenpox; Degeneration of lumbar or lumbosacral intervertebral disc; Diarrhea; DVT (deep venous thrombosis) (CMS-[facility]); Esophageal reflux; [place]dein[place] measles; Hernia of unspecified site of abdominal cavity without mention of obstruction or gangrene (repaired); History of colonoscopy (2005=normal); Influenza; Irritable bowel syndrome; Mumps; Pap smear (due); Pneumonia; Recurrent UTI (9/4/2009); Restless leg syndrome; Screening mammogram (10/30/2008=normal); Swelling (9/4/2009); Tonsillitis; Unspecified hemorrhoids without mention of complication; and Vein, varicose (stripped). She also has no past medical history of Encounter for long-term (current) use of other medications.    ROS  No chest pain, no shortness of breath, no abdominal pain       Objective:     Blood pressure 130/80, pulse 72, temperature 37.6 °C (99.6 °F), resp. rate 20, height 1.575 m (5' 2\"), weight 72.7 kg (160 lb 3.2 oz), SpO2 97 %. Body mass index is 29.3 kg/m².   Physical " Exam:  Constitutional: Alert, no distress.  Skin: Warm, dry, good turgor, no rashes in visible areas.  Eye: Equal, round and reactive, conjunctiva clear, lids normal.  ENMT: Lips without lesions, good dentition, oropharynx clear.  Neck: Trachea midline, no masses, no thyromegaly. No cervical or supraclavicular lymphadenopathy  Respiratory: Unlabored respiratory effort, lungs clear to auscultation, no wheezes, no ronchi.  Cardiovascular: Normal S1, S2, no murmur, no edema.  Abdomen: Soft, non-tender, no masses, no hepatosplenomegaly.  Psych: Alert and oriented x3, normal affect and mood.        Assessment and Plan:   The following treatment plan was discussed    1. Other acute pulmonary embolism without acute cor pulmonale (CMS-HCC)  Continue on xarelto for total of 6 months.     2. Factor V Leiden mutation (CMS-HCC)  Chronic condition, she had a small PE recently. Is on 6 months of oral anticoagulation.     3. Vision changes  Will refer to ophthalmology in light of blood coagulation abnormality and vision changes noted by patient.   - REFERRAL TO OPHTHALMOLOGY      Followup: Return in about 3 months (around 12/28/2017) for factor V, alpha 1 antitrypsin, SOB, vision changes. .

## 2017-10-03 NOTE — TELEPHONE ENCOUNTER
Just ABA  Recently saw this patient with small PE found on CT ordered by you, and she is doing well on xarelto. She has follow up with pulm.   Advised her to cancel her cardiology appt with you. Thank you,  Maribel

## 2017-10-10 ENCOUNTER — HOSPITAL ENCOUNTER (OUTPATIENT)
Dept: LAB | Facility: MEDICAL CENTER | Age: 67
End: 2017-10-10
Attending: NURSE PRACTITIONER
Payer: MEDICARE

## 2017-10-10 ENCOUNTER — HOSPITAL ENCOUNTER (OUTPATIENT)
Dept: LAB | Facility: MEDICAL CENTER | Age: 67
End: 2017-10-10
Attending: FAMILY MEDICINE
Payer: MEDICARE

## 2017-10-10 DIAGNOSIS — D68.51 HETEROZYGOUS FACTOR V LEIDEN MUTATION (HCC): ICD-10-CM

## 2017-10-10 DIAGNOSIS — D68.51 FACTOR V LEIDEN MUTATION (HCC): ICD-10-CM

## 2017-10-10 LAB
ALBUMIN SERPL BCP-MCNC: 4.2 G/DL (ref 3.2–4.9)
ALBUMIN/GLOB SERPL: 1.6 G/DL
ALP SERPL-CCNC: 53 U/L (ref 30–99)
ALT SERPL-CCNC: 16 U/L (ref 2–50)
ANION GAP SERPL CALC-SCNC: 7 MMOL/L (ref 0–11.9)
AST SERPL-CCNC: 17 U/L (ref 12–45)
BASOPHILS # BLD AUTO: 0.8 % (ref 0–1.8)
BASOPHILS # BLD: 0.04 K/UL (ref 0–0.12)
BILIRUB SERPL-MCNC: 0.4 MG/DL (ref 0.1–1.5)
BUN SERPL-MCNC: 15 MG/DL (ref 8–22)
CALCIUM SERPL-MCNC: 9.4 MG/DL (ref 8.5–10.5)
CHLORIDE SERPL-SCNC: 104 MMOL/L (ref 96–112)
CO2 SERPL-SCNC: 26 MMOL/L (ref 20–33)
CREAT SERPL-MCNC: 0.84 MG/DL (ref 0.5–1.4)
EOSINOPHIL # BLD AUTO: 0.07 K/UL (ref 0–0.51)
EOSINOPHIL NFR BLD: 1.4 % (ref 0–6.9)
ERYTHROCYTE [DISTWIDTH] IN BLOOD BY AUTOMATED COUNT: 38.1 FL (ref 35.9–50)
GFR SERPL CREATININE-BSD FRML MDRD: >60 ML/MIN/1.73 M 2
GLOBULIN SER CALC-MCNC: 2.6 G/DL (ref 1.9–3.5)
GLUCOSE SERPL-MCNC: 107 MG/DL (ref 65–99)
HCT VFR BLD AUTO: 45.3 % (ref 37–47)
HCYS SERPL-SCNC: 11.33 UMOL/L
HGB BLD-MCNC: 15 G/DL (ref 12–16)
IMM GRANULOCYTES # BLD AUTO: 0.01 K/UL (ref 0–0.11)
IMM GRANULOCYTES NFR BLD AUTO: 0.2 % (ref 0–0.9)
LYMPHOCYTES # BLD AUTO: 1.23 K/UL (ref 1–4.8)
LYMPHOCYTES NFR BLD: 25.1 % (ref 22–41)
MCH RBC QN AUTO: 28.2 PG (ref 27–33)
MCHC RBC AUTO-ENTMCNC: 33.1 G/DL (ref 33.6–35)
MCV RBC AUTO: 85.3 FL (ref 81.4–97.8)
MONOCYTES # BLD AUTO: 0.32 K/UL (ref 0–0.85)
MONOCYTES NFR BLD AUTO: 6.5 % (ref 0–13.4)
NEUTROPHILS # BLD AUTO: 3.23 K/UL (ref 2–7.15)
NEUTROPHILS NFR BLD: 66 % (ref 44–72)
NRBC # BLD AUTO: 0 K/UL
NRBC BLD AUTO-RTO: 0 /100 WBC
PLATELET # BLD AUTO: 269 K/UL (ref 164–446)
PMV BLD AUTO: 10.6 FL (ref 9–12.9)
POTASSIUM SERPL-SCNC: 3.8 MMOL/L (ref 3.6–5.5)
PROT SERPL-MCNC: 6.8 G/DL (ref 6–8.2)
RBC # BLD AUTO: 5.31 M/UL (ref 4.2–5.4)
SODIUM SERPL-SCNC: 137 MMOL/L (ref 135–145)
WBC # BLD AUTO: 4.9 K/UL (ref 4.8–10.8)

## 2017-10-10 PROCEDURE — 80053 COMPREHEN METABOLIC PANEL: CPT

## 2017-10-10 PROCEDURE — 36415 COLL VENOUS BLD VENIPUNCTURE: CPT

## 2017-10-10 PROCEDURE — 86147 CARDIOLIPIN ANTIBODY EA IG: CPT | Mod: 91

## 2017-10-10 PROCEDURE — 83090 ASSAY OF HOMOCYSTEINE: CPT | Mod: GA

## 2017-10-10 PROCEDURE — 85025 COMPLETE CBC W/AUTO DIFF WBC: CPT

## 2017-10-12 LAB
CARDIOLIPIN IGA SER IA-ACNC: 3 APL (ref 0–11)
CARDIOLIPIN IGG SER IA-ACNC: 3 GPL (ref 0–14)
CARDIOLIPIN IGM SER IA-ACNC: 0 MPL (ref 0–12)

## 2017-10-13 ENCOUNTER — ANTICOAGULATION VISIT (OUTPATIENT)
Dept: MEDICAL GROUP | Facility: PHYSICIAN GROUP | Age: 67
End: 2017-10-13
Payer: MEDICARE

## 2017-10-13 VITALS — SYSTOLIC BLOOD PRESSURE: 123 MMHG | HEART RATE: 84 BPM | DIASTOLIC BLOOD PRESSURE: 87 MMHG

## 2017-10-13 DIAGNOSIS — D68.51 FACTOR V LEIDEN MUTATION (HCC): ICD-10-CM

## 2017-10-13 DIAGNOSIS — I26.99 OTHER ACUTE PULMONARY EMBOLISM WITHOUT ACUTE COR PULMONALE (HCC): ICD-10-CM

## 2017-10-13 DIAGNOSIS — Z79.01 LONG TERM CURRENT USE OF ANTICOAGULANT THERAPY: ICD-10-CM

## 2017-10-13 LAB — INR PPP: 1 (ref 2–3.5)

## 2017-10-13 PROCEDURE — 85610 PROTHROMBIN TIME: CPT | Performed by: NURSE PRACTITIONER

## 2017-10-13 NOTE — PROGRESS NOTES
Anticoagulation Summary  As of 10/13/2017    INR goal:      TTR:   --   Today's INR:   1.0   Maintenance plan:   No maintenance plan   Plan last modified:   Fernando Charles, PharmD (9/22/2017)   Next INR check:   12/1/2017   Target end date:   12/7/2017    Indications    Factor V Leiden mutation (CMS-[facility]) [D68.51]  Pulmonary embolism (CMS-HCC) [I26.99] [I26.99]  Long term (current) use of anticoagulants [Z79.01] [Z79.01]             Anticoagulation Episode Summary     INR check location:       Preferred lab:       Send INR reminders to:       Comments:   Xarelto       Anticoagulation Care Providers     Provider Role Specialty Phone number    [facility] Responsible  [phone]    Maribel Long M.D. Responsible Family Medicine [phone]        Anticoagulation Patient Findings    Health Status Since Last Assessment  Any new relevant medical problems, ED visits/hospitalizations, no  Any embolic events (stroke / TIA / systemic embolism), no    Adherence with DOAC Therapy  1 or more missed doses in an average week , no      Bleeding Risk Assessment  Severe epistaxis, no Hemoptysis, no  Excessive or unusual bruising / hematomas , no  GIB / melena / BRBPR / hematemesis , no  Hematuria? Abnormal vaginal bleeding , no  Concerning daily headache or subdural hematoma symptoms , no  Decreasing hemoglobin or new anemia , no  Latest hemoglobin:     Lab Results   Component Value Date/Time    WBC 4.9 10/10/2017 01:57 PM    WBC 4.2 12/30/2009 11:55 AM    RBC 5.31 10/10/2017 01:57 PM    RBC 5.58 (H) 12/30/2009 11:55 AM    HEMOGLOBIN 15.0 10/10/2017 01:57 PM    HEMATOCRIT 45.3 10/10/2017 01:57 PM    MCV 85.3 10/10/2017 01:57 PM    MCV 87 12/30/2009 11:55 AM    MCH 28.2 10/10/2017 01:57 PM    MCH 29.3 12/30/2009 11:55 AM    MCHC 33.1 (L) 10/10/2017 01:57 PM    MPV 10.6 10/10/2017 01:57 PM    NEUTSPOLYS 66.00 10/10/2017 01:57 PM    LYMPHOCYTES 25.10 10/10/2017 01:57 PM    MONOCYTES 6.50 10/10/2017 01:57 PM     EOSINOPHILS 1.40 10/10/2017 01:57 PM    BASOPHILS 0.80 10/10/2017 01:57 PM        EtOH overuse, no  Falls, presyncope, syncope, or seizures, no  Uncontrolled hypertension, no  CREATININE CLEARANCE /    Creatinine Clearance/Renal Function  Latest eGFR / creatinine:  Lab Results   Component Value Date/Time    SODIUM 137 10/10/2017 01:57 PM    POTASSIUM 3.8 10/10/2017 01:57 PM    CHLORIDE 104 10/10/2017 01:57 PM    CO2 26 10/10/2017 01:57 PM    GLUCOSE 107 (H) 10/10/2017 01:57 PM    BUN 15 10/10/2017 01:57 PM    CREATININE 0.84 10/10/2017 01:57 PM    CREATININE 0.96 12/30/2009 11:55 AM    BUNCREATRAT 20 12/30/2009 11:55 AM    GLOMRATE 59 (L) 12/30/2009 11:55 AM      • Is eGFR less than 50ml/min, WNL  If YES, calculate CrCl (see back)  Any recent dehydrating illness or medications added/changed? i.e. diuretics    Drug Interactions  ASA / other antiplatelets., no  NSAID, no  Other drug interactions, off Mobic (Review med list / OTCs;)  Current Outpatient Prescriptions on File Prior to Visit   Medication Sig Dispense Refill   • rivaroxaban (XARELTO) 20 MG Tab tablet Take 1 Tab by mouth with dinner. 90 Tab 1   • OMEGA-3 KRILL OIL PO Take 1 tablet by mouth every day.     • Lifitegrast (XIIDRA) 5 % Solution 1 Drop by Ophthalmic route 2 Times a Day.     • azithromycin (ZITHROMAX) 250 MG Tab Take 2 tablets on day 1, then take 1 tablet a day for 4 days. 6 Tab 0   • albuterol 108 (90 BASE) MCG/ACT Aero Soln inhalation aerosol Inhale 2 Puffs by mouth every 6 hours as needed for Shortness of Breath. 8.5 g 3   • Multiple Vitamins-Minerals (MULTIVITAMIN PO) Take  by mouth.     • Cholecalciferol (VITAMIN D PO) Take  by mouth.     • Probiotic Product (PROBIOTIC DAILY PO) Take  by mouth.     • Ascorbic Acid (VITAMIN C PO) Take 500 mg by mouth every day.     • meloxicam (MOBIC) 15 MG tablet Take 1 Tab by mouth every day. 30 Tab 0   • omeprazole (PRILOSEC) 20 MG delayed-release capsule Take 20 mg by mouth every day.     • Diclofenac  Sodium 1 % Gel Apply 2gm in a thin layer to joint pain in hands up to 4 times per day if needed 1 Tube 3   • tramadol (ULTRAM) 50 MG TABS Take 1 Tab by mouth every 8 hours as needed (moderate to severe pain). 60 Tab 0     No current facility-administered medications on file prior to visit.        Examination  Blood pressure: wnl     Final Assessment and Recommendations:  Patient appears stable from the anticoagulation standpoint  Benefits of continued DOAC therapy outweigh risks for this patient  Recommend continue current DOAC at same dose of 20mg once daily       Follow up:  Will follow up with patient in 2 months after she has a appt with Hem/Onc and Pulm.        Fernando Charles, PharmD

## 2017-10-17 ENCOUNTER — OFFICE VISIT (OUTPATIENT)
Dept: PULMONOLOGY | Facility: HOSPICE | Age: 67
End: 2017-10-17
Payer: MEDICARE

## 2017-10-17 VITALS
OXYGEN SATURATION: 96 % | RESPIRATION RATE: 16 BRPM | HEART RATE: 78 BPM | TEMPERATURE: 97.2 F | HEIGHT: 63 IN | DIASTOLIC BLOOD PRESSURE: 72 MMHG | BODY MASS INDEX: 28.95 KG/M2 | SYSTOLIC BLOOD PRESSURE: 122 MMHG | WEIGHT: 163.4 LBS

## 2017-10-17 DIAGNOSIS — D68.51 FACTOR V LEIDEN MUTATION (HCC): ICD-10-CM

## 2017-10-17 DIAGNOSIS — I26.99 OTHER ACUTE PULMONARY EMBOLISM WITHOUT ACUTE COR PULMONALE (HCC): ICD-10-CM

## 2017-10-17 DIAGNOSIS — E88.01 HETEROZYGOUS ALPHA 1-ANTITRYPSIN DEFICIENCY (HCC): ICD-10-CM

## 2017-10-17 PROCEDURE — 99214 OFFICE O/P EST MOD 30 MIN: CPT | Performed by: INTERNAL MEDICINE

## 2017-10-17 NOTE — PROGRESS NOTES
Chief Complaint   Patient presents with   • Results     PFT results.     HPI: This patient is a 67 y.o. female who is referred for abnormal chest CAT scan. She was in her usual state of good health until May 2017 when she noted mild exertional dyspnea and chest discomfort. She saw her cardiologist and underwent an exercise treadmill test which was normal. Following her treadmill test, she noted left leg edema, and underwent duplex ultrasound showing an acute thrombus of the left greater saphenous vein. She subsequently had a chest CAT scan performed September 15, 2017 confirming a small right lower lobe pulmonary embolism. Additionally there was associated scattered groundglass opacities, and tree-in-bud nodularity. She denies hemoptysis, sputum, fevers or chills. Her exertional dyspnea is stable. She has a history of factor V Leiden deficiency, had superficial phlebitis in 1976, however and denies any history of deep venous thrombosis or prior pulmonary embolism. She is now on treatment with Xarelto. She is pending evaluation by hematology, Dr. Cervantes. Pulmonary function testing was performed showing normal lung function and DLCO. Alpha-1 antitrypsin level was low at 78 mg/dL with MZ phenotype.        Past Medical History:   Diagnosis Date   • Recurrent UTI 9/4/2009   • Swelling 9/4/2009   • Abdominal pain, unspecified site    • Alpha 1-antitrypsin PiMS phenotype    • Back pain    • Bronchitis    • Chickenpox    • Degeneration of lumbar or lumbosacral intervertebral disc    • Diarrhea    • DVT (deep venous thrombosis) (CMS-[facility])    • Esophageal reflux    • [place] measles    • Hernia of unspecified site of abdominal cavity without mention of obstruction or gangrene repaired   • History of colonoscopy 2005=normal    Dr. [facility]> [facility]   • Influenza    • Irritable bowel syndrome    • Mumps    • Pap smear due   • Pneumonia    • Restless leg syndrome    • Screening mammogram 10/30/2008=normal   • Tonsillitis     • Unspecified hemorrhoids without mention of complication    • Vein, varicose stripped       Social History     Social History   • Marital status:      Spouse name: N/A   • Number of children: N/A   • Years of education: N/A     Occupational History   • Not on file.     Social History Main Topics   • Smoking status: Former Smoker     Packs/day: 0.25     Years: 4.00     Types: Cigarettes     Quit date: 1/1/1969   • Smokeless tobacco: Never Used      Comment: only smokes 1-2 cigs a couple times a week for 1-2 years   • Alcohol use 0.0 - 1.2 oz/week      Comment: occasional   • Drug use: No   • Sexual activity: Yes     Partners: Male      Comment:      Other Topics Concern   • Not on file     Social History Narrative   • No narrative on file       Family History   Problem Relation Age of Onset   • Hypertension Mother    • Diabetes Mother      pre diabetes   • Diabetes Father    • Hypertension Father    • Kidney Disease Father    • Respiratory Disease Brother        Current Outpatient Prescriptions on File Prior to Visit   Medication Sig Dispense Refill   • rivaroxaban (XARELTO) 20 MG Tab tablet Take 1 Tab by mouth with dinner. 90 Tab 1   • OMEGA-3 KRILL OIL PO Take 1 tablet by mouth every day.     • Lifitegrast (XIIDRA) 5 % Solution 1 Drop by Ophthalmic route 2 Times a Day.     • Multiple Vitamins-Minerals (MULTIVITAMIN PO) Take  by mouth.     • Cholecalciferol (VITAMIN D PO) Take  by mouth.     • Probiotic Product (PROBIOTIC DAILY PO) Take  by mouth.     • Ascorbic Acid (VITAMIN C PO) Take 500 mg by mouth every day.     • omeprazole (PRILOSEC) 20 MG delayed-release capsule Take 20 mg by mouth every day.     • azithromycin (ZITHROMAX) 250 MG Tab Take 2 tablets on day 1, then take 1 tablet a day for 4 days. 6 Tab 0   • albuterol 108 (90 BASE) MCG/ACT Aero Soln inhalation aerosol Inhale 2 Puffs by mouth every 6 hours as needed for Shortness of Breath. 8.5 g 3   • meloxicam (MOBIC) 15 MG tablet Take 1 Tab  "by mouth every day. 30 Tab 0   • Diclofenac Sodium 1 % Gel Apply 2gm in a thin layer to joint pain in hands up to 4 times per day if needed 1 Tube 3   • tramadol (ULTRAM) 50 MG TABS Take 1 Tab by mouth every 8 hours as needed (moderate to severe pain). 60 Tab 0     No current facility-administered medications on file prior to visit.        Allergies: Review of patient's allergies indicates no known allergies.    ROS:   Constitutional: Denies fevers, chills, night sweats, fatigue or weight loss  Eyes: Denies vision loss, pain, drainage, double vision  Ears, Nose, Throat: Denies earache, difficulty hearing, tinnitus, nasal congestion, hoarseness  Cardiovascular: Denies chest pain, tightness, palpitations, orthopnea or edema  Respiratory: As in history of present illness  Sleep: Denies daytime sleepiness, snoring, apneas, insomnia, morning headaches  GI: Denies heartburn, dysphagia, nausea, abdominal pain, diarrhea or constipation  : Denies frequent urination, hematuria, discharge or painful urination  Musculoskeletal: Denies back pain, painful joints, sore muscles  Neurological: Denies weakness or headaches  Skin: No rashes    Blood pressure 122/72, pulse 78, temperature 36.2 °C (97.2 °F), resp. rate 16, height 1.6 m (5' 3\"), weight 74.1 kg (163 lb 6.4 oz), SpO2 96 %.    Physical Exam:  Appearance: Well-nourished, well-developed, in no acute distress  HEENT: Normocephalic, atraumatic, white sclera, PERRLA, oropharynx clear  Neck: No adenopathy or masses  Respiratory: no intercostal retractions or accessory muscle use  Lungs auscultation: Clear to auscultation bilaterally  Cardiovascular: Regular rate rhythm. No murmurs, rubs or gallops.  No LE edema  Abdomen: soft, nondistended  Gait: Normal  Digits: No clubbing, cyanosis  Motor: No focal deficits  Orientation: Oriented to time, person and place    Diagnosis:  1. Other acute pulmonary embolism without acute cor pulmonale (CMS-HCC)  CT-CTA CHEST PULMONARY ARTERY W/ " RECONS   2. Factor V Leiden mutation (CMS-HCC)     3. Heterozygous alpha 1-antitrypsin deficiency (CMS-HCC)         Plan:  The patient is appropriately on anticoagulation with Xarelto for her pulmonary embolism. She has factor V Leiden mutation. Her pulmonary function testing is normal. She is pending hematology opinion regarding duration of treatment. Suggest at least 6 months of anticoagulation. We will repeat chest CAT scan in 6 months to reassess her pulmonary embolism.  She is heterozygote for alpha-1 antitrypsin deficiency, with normal pulmonary function testing. She is a nonsmoker. We'll continue to monitor annual PFTs, however no specific treatment is required at this stage.  Return in about 6 months (around 4/17/2018) for after CT scan.

## 2017-10-20 ENCOUNTER — OFFICE VISIT (OUTPATIENT)
Dept: HEMATOLOGY ONCOLOGY | Facility: MEDICAL CENTER | Age: 67
End: 2017-10-20
Payer: MEDICARE

## 2017-10-20 VITALS
HEART RATE: 79 BPM | OXYGEN SATURATION: 98 % | WEIGHT: 163.69 LBS | HEIGHT: 62 IN | BODY MASS INDEX: 30.12 KG/M2 | SYSTOLIC BLOOD PRESSURE: 126 MMHG | RESPIRATION RATE: 14 BRPM | DIASTOLIC BLOOD PRESSURE: 74 MMHG | TEMPERATURE: 99 F

## 2017-10-20 DIAGNOSIS — R06.02 SOB (SHORTNESS OF BREATH): ICD-10-CM

## 2017-10-20 DIAGNOSIS — I80.02 SUPERFICIAL THROMBOPHLEBITIS OF LEFT LEG: ICD-10-CM

## 2017-10-20 DIAGNOSIS — D68.51 FACTOR V LEIDEN MUTATION (HCC): ICD-10-CM

## 2017-10-20 PROCEDURE — 99204 OFFICE O/P NEW MOD 45 MIN: CPT | Performed by: INTERNAL MEDICINE

## 2017-10-20 ASSESSMENT — PAIN SCALES - GENERAL: PAINLEVEL: NO PAIN

## 2017-10-20 NOTE — PROGRESS NOTES
Consult Note: Hematology    Date of consultation: 10/20/2017 9:06 AM    Referring provider: Ruchi Long M.D.    Reason for consultation: Factor V Leiden heterozygous mutation with history of PE    History of presenting illness:     Dear Maribel,    Thank you very much for allowing me to see  Hermelinda Mosley today . As you know she  is a 67 y.o. year old female who presented with symptomatic saphenous vein thrombophlebitis extending around 5 cm according to the ultrasound back from 9/12/2017. She has prior history of varicosities and vein stripping. A short follow-up ultrasound continued to show persistent thrombus in the great saphenous vein . She then developed shortness of breath and had a CT angiogram of the lungs done on 9:15, which showed a small nonocclusive right lower lobe PE. She had some groundglass bilateral upper lobe opacities. She had small bilateral pulmonary nodules . According to her, she had some dyspnea symptoms over the past few months. She was tested heterozygous factor V Leiden mutation positive. Protein C levels were actually elevated.    Interestingly, there is significant family history of heterozygous factor V Leiden mutation. Her daughter who is accompanying her also developed provoked PE in the postoperative setting. The patient Is alpha one antitrypsin PI MS phenotype. She is being followed up by pulmonary. She has a 6 month follow-up CT scan ordered. She has not had any bleeding issues, however, she has questions about the duration of anticoagulation and does not want to be on it long-term.    Past Medical History:    Past Medical History:   Diagnosis Date   • Abdominal pain, unspecified site    • Alpha 1-antitrypsin PiMS phenotype    • Back pain    • Bronchitis    • Chickenpox    • Degeneration of lumbar or lumbosacral intervertebral disc    • Diarrhea    • DVT (deep venous thrombosis) (CMS-[facility])    • Esophageal reflux    • [place] measles    • Hernia of unspecified site of  abdominal cavity without mention of obstruction or gangrene repaired   • History of colonoscopy 2005=normal    Dr. [facility]> [facility]   • Influenza    • Irritable bowel syndrome    • Mumps    • Pap smear due   • Pneumonia    • Recurrent UTI 9/4/2009   • Restless leg syndrome    • Screening mammogram 10/30/2008=normal   • Superficial thrombophlebitis of left leg 10/20/2017   • Swelling 9/4/2009   • Tonsillitis    • Unspecified hemorrhoids without mention of complication    • Vein, varicose stripped       Past surgical history:    Past Surgical History:   Procedure Laterality Date   • CHOLECYSTECTOMY     • HERNIA REPAIR     • VEIN STRIPPING         Allergies:  Review of patient's allergies indicates no known allergies.    Medications:    Current Outpatient Prescriptions   Medication Sig Dispense Refill   • rivaroxaban (XARELTO) 20 MG Tab tablet Take 1 Tab by mouth with dinner. 90 Tab 1   • Multiple Vitamins-Minerals (MULTIVITAMIN PO) Take  by mouth.     • Cholecalciferol (VITAMIN D PO) Take  by mouth.     • Probiotic Product (PROBIOTIC DAILY PO) Take  by mouth.     • Ascorbic Acid (VITAMIN C PO) Take 500 mg by mouth every day.     • omeprazole (PRILOSEC) 20 MG delayed-release capsule Take 20 mg by mouth every day.     • OMEGA-3 KRILL OIL PO Take 1 tablet by mouth every day.     • Lifitegrast (XIIDRA) 5 % Solution 1 Drop by Ophthalmic route 2 Times a Day.     • azithromycin (ZITHROMAX) 250 MG Tab Take 2 tablets on day 1, then take 1 tablet a day for 4 days. 6 Tab 0   • albuterol 108 (90 BASE) MCG/ACT Aero Soln inhalation aerosol Inhale 2 Puffs by mouth every 6 hours as needed for Shortness of Breath. 8.5 g 3   • meloxicam (MOBIC) 15 MG tablet Take 1 Tab by mouth every day. 30 Tab 0   • Diclofenac Sodium 1 % Gel Apply 2gm in a thin layer to joint pain in hands up to 4 times per day if needed 1 Tube 3   • tramadol (ULTRAM) 50 MG TABS Take 1 Tab by mouth every 8 hours as needed (moderate to severe pain). 60 Tab 0      No current facility-administered medications for this visit.        Social History:     Social History     Social History   • Marital status:      Spouse name: N/A   • Number of children: N/A   • Years of education: N/A     Occupational History   • Not on file.     Social History Main Topics   • Smoking status: Former Smoker     Packs/day: 0.25     Years: 4.00     Types: Cigarettes     Quit date: 1/1/1969   • Smokeless tobacco: Never Used      Comment: only smokes 1-2 cigs a couple times a week for 1-2 years   • Alcohol use 0.0 - 1.2 oz/week      Comment: occasional   • Drug use: No   • Sexual activity: Yes     Partners: Male      Comment:      Other Topics Concern   • Not on file     Social History Narrative   • No narrative on file       Family History:   Significant family history of venous thrombosis embolism involving 2daughters.  Family History   Problem Relation Age of Onset   • Hypertension Mother    • Diabetes Mother      pre diabetes   • Diabetes Father    • Hypertension Father    • Kidney Disease Father    • Respiratory Disease Brother        Review of Systems:  All other review of systems are negative except what was mentioned above in the HPI.    Constitutional: No fever, chills, weight loss ,malaise/fatigue.    HEENT: No new auditory or visual complaints. No sore throat and neck pain.     Respiratory:No new cough, sputum production, shortness of breath and wheezing.    Cardiovascular: No new chest pain, palpitations, orthopnea and leg swelling.    Gastrointestinal: No heartburn, nausea, vomiting ,abdominal pain, hematochezia or melena     Genitourinary: Negative for dysuria, hematuria    Musculoskeletal: No new arthralgias or myalgias   Skin: Negative for rash and itching.    Neurological: Negative for focal weakness or headaches.    Endo/Heme/Allergies: No abnormal bleed/bruise.    Psychiatric/Behavioral: No new depression/anxiety.    Physical Exam:  Vitals:   /74   Pulse 79   " Temp 37.2 °C (99 °F)   Resp 14   Ht 1.575 m (5' 2\")   Wt 74.2 kg (163 lb 11.1 oz)   SpO2 98%   Breastfeeding? No   BMI 29.94 kg/m²     General: Not in acute distress, alert and oriented x 3  HEENT: No pallor, icterus. Oropharynx clear.   Neck: Supple, no palpable masses.  Lymph nodes: No palpable cervical, supraclavicular, axillary or inguinal lymphadenopathy.    CVS: regular rate and rhythm, no rubs or gallops  RESP: Clear to auscultate bilaterally, no wheezing or crackles.   ABD: Soft, non tender, non distended, positive bowel sounds, no palpable organomegaly  EXT: No edema or cyanosis. No calf tenderness. She has some varicosities  CNS: Alert and oriented x3, No focal deficits.  Skin- No rash      Labs:   No results for input(s): RBC, HEMOGLOBIN, HEMATOCRIT, PLATELETCT, PROTHROMBTM, APTT, INR, IRON, FERRITIN, TOTIRONBC in the last 72 hours.  Lab Results   Component Value Date/Time    SODIUM 137 10/10/2017 01:57 PM    POTASSIUM 3.8 10/10/2017 01:57 PM    CHLORIDE 104 10/10/2017 01:57 PM    CO2 26 10/10/2017 01:57 PM    GLUCOSE 107 (H) 10/10/2017 01:57 PM    BUN 15 10/10/2017 01:57 PM    CREATININE 0.84 10/10/2017 01:57 PM    CREATININE 0.96 12/30/2009 11:55 AM    BUNCREATRAT 20 12/30/2009 11:55 AM    GLOMRATE 59 (L) 12/30/2009 11:55 AM        Assessment and Plan:   Unprovoked saphenous vein thrombophlebitis and incidental finding of low volume, PE in a patient with heterozygous factor V Leiden mutation. She does not have obvious triggering factors like obesity, recent surgery or long distance travel. She is tolerating xarelto reasonably well. She does have prior history of varicose vein stripping, which can sometimes increase the risk of DVTs in the future, even though this is not clearly proven.    The patient has been tested positive for heterozygous factor V Leiden mutation. I will also check for prothrombin gene mutation. She had mild elevation of the homocysteine level, which is probably not " significant. I will check a fasting level. Anticardiolipin screen was negative.  Had a long discussion with the patient and her daughter. Informed her that we have to individualize anticoagulation depending on the patient's risk profile. The fact that she had an incidental PE from superficial thrombophlebitis indicates that she probably has undiagnosed DVT. Given the unprovoked nature, heterozygous factor V Leiden mutation, and significant family history of venous thrombolysis on, I am inclined to recommend long term anti-coagulation. I recommended her to continue xarelto for 6 months. I will schedule her for an ultrasound of the lower extremity in April and see her back with the results. Consideration can be given for dose reducing her xarelto to 10 mg following the recent trial data. Informed her that this should reduce the risk of any bleeding complications while effectively prophylaxing against future episode. We will discuss this more after her next appointment. She apparently is a light smoker and I instructed her to quit smoking. She already have a follow-up CT scan scheduled in 6 months for small pulmonary nodule. She is up-to-date with her screening and does not appear to have any overt malignancies at this time.      She agreed and verbalized her agreement and understanding with the current plan.  I answered all questions and concerns she has at this time.              Thank you for allowing me to participate in her care.    Please note that this dictation was created using voice recognition software. I have made every reasonable attempt to correct obvious errors, but I expect that there are errors of grammar and possibly content that I did not discover before finalizing the note.      SIGNATURES:  Vitaly Cervantes    CC:  LO Villatoro Cheruba, M.D.

## 2017-12-01 ENCOUNTER — ANTICOAGULATION VISIT (OUTPATIENT)
Dept: MEDICAL GROUP | Facility: PHYSICIAN GROUP | Age: 67
End: 2017-12-01
Payer: MEDICARE

## 2017-12-01 ENCOUNTER — HOSPITAL ENCOUNTER (OUTPATIENT)
Dept: LAB | Facility: MEDICAL CENTER | Age: 67
End: 2017-12-01
Attending: INTERNAL MEDICINE
Payer: MEDICARE

## 2017-12-01 DIAGNOSIS — D68.51 FACTOR V LEIDEN MUTATION (HCC): ICD-10-CM

## 2017-12-01 DIAGNOSIS — I26.99 OTHER ACUTE PULMONARY EMBOLISM WITHOUT ACUTE COR PULMONALE (HCC): ICD-10-CM

## 2017-12-01 DIAGNOSIS — Z79.01 LONG TERM CURRENT USE OF ANTICOAGULANT THERAPY: ICD-10-CM

## 2017-12-01 LAB
HCYS SERPL-SCNC: 12.52 UMOL/L
INR PPP: 1.1 (ref 2–3.5)

## 2017-12-01 PROCEDURE — 99211 OFF/OP EST MAY X REQ PHY/QHP: CPT | Performed by: NURSE PRACTITIONER

## 2017-12-01 PROCEDURE — 36415 COLL VENOUS BLD VENIPUNCTURE: CPT

## 2017-12-01 PROCEDURE — 83090 ASSAY OF HOMOCYSTEINE: CPT | Mod: GA

## 2017-12-01 PROCEDURE — 81240 F2 GENE: CPT

## 2017-12-01 PROCEDURE — 85610 PROTHROMBIN TIME: CPT | Performed by: NURSE PRACTITIONER

## 2017-12-01 NOTE — PROGRESS NOTES
Anticoagulation Summary  As of 12/1/2017    INR goal:      TTR:   --   Today's INR:   1.1   Maintenance plan:   No maintenance plan   Plan last modified:   Fernando Charles, PharmD (9/22/2017)   Next INR check:   6/1/2018   Target end date:   Indefinite    Indications    Factor V Leiden mutation (CMS-HCC) [D68.51]  Pulmonary embolism (CMS-HCC) [I26.99] [I26.99]  Long term (current) use of anticoagulants [Z79.01] [Z79.01]             Anticoagulation Episode Summary     INR check location:       Preferred lab:       Send INR reminders to:       Comments:   Xarelto       Anticoagulation Care Providers     Provider Role Specialty Phone number    [facility] Responsible  [phone]    Maribel Long M.D. Responsible Family Medicine [phone]        Anticoagulation Patient Findings     In clinic for f/u with Xarelto.     Health Status Since Last Assessment  Any new relevant medical problems, ED visits/hospitalizations, no  Any embolic events (stroke / TIA / systemic embolism), no    Adherence with DOAC Therapy  1 or more missed doses in an average week , no  BLEEDING RISK ASSESSMENT NB:    Bleeding Risk Assessment  Severe epistaxis, no Hemoptysis no  Excessive or unusual bruising / hematomas no  GIB / melena / BRBPR / hematemesis no  Hematuria? Abnormal vaginal bleeding no  Concerning daily headache or subdural hematoma symptoms no  Decreasing hemoglobin or new anemia no  Latest hemoglobin:     Lab Results   Component Value Date/Time    WBC 4.9 10/10/2017 01:57 PM    WBC 4.2 12/30/2009 11:55 AM    RBC 5.31 10/10/2017 01:57 PM    RBC 5.58 (H) 12/30/2009 11:55 AM    HEMOGLOBIN 15.0 10/10/2017 01:57 PM    HEMATOCRIT 45.3 10/10/2017 01:57 PM    MCV 85.3 10/10/2017 01:57 PM    MCV 87 12/30/2009 11:55 AM    MCH 28.2 10/10/2017 01:57 PM    MCH 29.3 12/30/2009 11:55 AM    MCHC 33.1 (L) 10/10/2017 01:57 PM    MPV 10.6 10/10/2017 01:57 PM    NEUTSPOLYS 66.00 10/10/2017 01:57 PM    LYMPHOCYTES 25.10  10/10/2017 01:57 PM    MONOCYTES 6.50 10/10/2017 01:57 PM    EOSINOPHILS 1.40 10/10/2017 01:57 PM    BASOPHILS 0.80 10/10/2017 01:57 PM        EtOH overuse, no  Falls, presyncope, syncope, or seizures, no  Uncontrolled hypertension, no  CREATININE CLEARANCE /    Creatinine Clearance/Renal Function  Latest eGFR / creatinine:  Lab Results   Component Value Date/Time    SODIUM 137 10/10/2017 01:57 PM    POTASSIUM 3.8 10/10/2017 01:57 PM    CHLORIDE 104 10/10/2017 01:57 PM    CO2 26 10/10/2017 01:57 PM    GLUCOSE 107 (H) 10/10/2017 01:57 PM    BUN 15 10/10/2017 01:57 PM    CREATININE 0.84 10/10/2017 01:57 PM    CREATININE 0.96 12/30/2009 11:55 AM    BUNCREATRAT 20 12/30/2009 11:55 AM    GLOMRATE 59 (L) 12/30/2009 11:55 AM      • Is eGFR less than 50ml/min WNL, she will get labs today   If YES, calculate CrCl (see back)  Any recent dehydrating illness or medications added/changed? i.e. diuretics    Drug Interactions  ASA / other antiplatelets., no  NSAID, off mobic, only PRN discussed risk   Other drug interactions, some fish oil, talked about bleed risk  (Review med list / OTCs;)  Current Outpatient Prescriptions on File Prior to Visit   Medication Sig Dispense Refill   • rivaroxaban (XARELTO) 20 MG Tab tablet Take 1 Tab by mouth with dinner. 90 Tab 1   • OMEGA-3 KRILL OIL PO Take 1 tablet by mouth every day.     • Lifitegrast (XIIDRA) 5 % Solution 1 Drop by Ophthalmic route 2 Times a Day.     • azithromycin (ZITHROMAX) 250 MG Tab Take 2 tablets on day 1, then take 1 tablet a day for 4 days. 6 Tab 0   • albuterol 108 (90 BASE) MCG/ACT Aero Soln inhalation aerosol Inhale 2 Puffs by mouth every 6 hours as needed for Shortness of Breath. 8.5 g 3   • Multiple Vitamins-Minerals (MULTIVITAMIN PO) Take  by mouth.     • Cholecalciferol (VITAMIN D PO) Take  by mouth.     • Probiotic Product (PROBIOTIC DAILY PO) Take  by mouth.     • Ascorbic Acid (VITAMIN C PO) Take 500 mg by mouth every day.     • meloxicam (MOBIC) 15 MG tablet  Take 1 Tab by mouth every day. 30 Tab 0   • omeprazole (PRILOSEC) 20 MG delayed-release capsule Take 20 mg by mouth every day.     • Diclofenac Sodium 1 % Gel Apply 2gm in a thin layer to joint pain in hands up to 4 times per day if needed 1 Tube 3   • tramadol (ULTRAM) 50 MG TABS Take 1 Tab by mouth every 8 hours as needed (moderate to severe pain). 60 Tab 0     No current facility-administered medications on file prior to visit.        Examination  Blood pressure: WNl     Final Assessment and Recommendations:  Patient appears stable from the anticoagulation standpoint  Benefits of continued DOAC therapy outweigh risks for this patient  Recommend continue current DOAC at same dose, at some point we might want to decrease the dose, hard to say in her case as she has genetic reasons why she is clotting and higher risk. At this point best to stay with the 20mg tab.   Her oncologist thinks it is best to stay on Xarelto at this point. See note from 10/20/2017 for details.       Other Actions:   The rationale for continued DOAC therapy  The potential for minor, major or life-threatening bleeding  Dosing instructions, adherence, risks of non-adherence, handling missed doses  Avoiding OTC ASA & NSAIDs & minimizing EtOH to reduce bleeding risks    Follow up:  Will follow up with patient in 6 months    Fernando Charles, AliciaD

## 2017-12-06 LAB — F2 C.20210G>A GENO BLD/T: NEGATIVE

## 2017-12-19 ENCOUNTER — OFFICE VISIT (OUTPATIENT)
Dept: MEDICAL GROUP | Facility: PHYSICIAN GROUP | Age: 67
End: 2017-12-19
Payer: MEDICARE

## 2017-12-19 VITALS
DIASTOLIC BLOOD PRESSURE: 72 MMHG | BODY MASS INDEX: 30.18 KG/M2 | HEIGHT: 62 IN | SYSTOLIC BLOOD PRESSURE: 128 MMHG | WEIGHT: 164 LBS | RESPIRATION RATE: 16 BRPM | HEART RATE: 74 BPM | TEMPERATURE: 98.2 F | OXYGEN SATURATION: 98 %

## 2017-12-19 DIAGNOSIS — R79.89 ELEVATED HOMOCYSTEINE: ICD-10-CM

## 2017-12-19 DIAGNOSIS — D68.51 FACTOR V LEIDEN MUTATION (HCC): ICD-10-CM

## 2017-12-19 DIAGNOSIS — K64.8 OTHER HEMORRHOIDS: ICD-10-CM

## 2017-12-19 DIAGNOSIS — I26.99 OTHER ACUTE PULMONARY EMBOLISM WITHOUT ACUTE COR PULMONALE (HCC): ICD-10-CM

## 2017-12-19 DIAGNOSIS — E66.9 OBESITY (BMI 30-39.9): ICD-10-CM

## 2017-12-19 DIAGNOSIS — N18.30 CHRONIC KIDNEY DISEASE, STAGE 3 (HCC): ICD-10-CM

## 2017-12-19 DIAGNOSIS — M25.372 LEFT ANKLE INSTABILITY: ICD-10-CM

## 2017-12-19 PROCEDURE — 99214 OFFICE O/P EST MOD 30 MIN: CPT | Mod: 25 | Performed by: FAMILY MEDICINE

## 2017-12-19 RX ORDER — HYDROCORTISONE ACETATE 25 MG/1
25 SUPPOSITORY RECTAL EVERY 12 HOURS
Qty: 10 SUPPOSITORY | Refills: 3 | Status: SHIPPED | OUTPATIENT
Start: 2017-12-19 | End: 2018-08-23

## 2017-12-19 RX ORDER — FOLIC ACID 1 MG/1
1 TABLET ORAL DAILY
Qty: 30 TAB | Refills: 3 | Status: SHIPPED | OUTPATIENT
Start: 2017-12-19 | End: 2018-08-23

## 2017-12-19 RX ORDER — CYANOCOBALAMIN 1000 UG/ML
1000 INJECTION, SOLUTION INTRAMUSCULAR; SUBCUTANEOUS ONCE
Status: COMPLETED | OUTPATIENT
Start: 2017-12-19 | End: 2017-12-19

## 2017-12-19 RX ADMIN — CYANOCOBALAMIN 1000 MCG: 1000 INJECTION, SOLUTION INTRAMUSCULAR; SUBCUTANEOUS at 11:47

## 2017-12-19 NOTE — ASSESSMENT & PLAN NOTE
Patient is heterozygous for Factor V Leiden. She also has two daughters who are positive for this.   She has no known hx of DVT but recent lower extremity superficial thrombosis and a small PE found on CT done for shortness of breath indicates possible undiagnosed DVT.   She is on xarelto for 6 months, currently on 20 mg. Was evaluated by her hematologist Dr. Cervantes and is going to have repeat US of extremity in April and has a CT scan for follow up. She also has follow up with pulmonology.   She is concerned about continuing long term anticoagulation, this will be on ongoing evaluation.   Her fasting homocysteine levels ordered by Dr. Cervantes also are elevated. I advised her to start folic acid, Vitamin B6 and B 12 and eat  Diet low in saturated fat, with plenty of fruit and vegetables.

## 2017-12-19 NOTE — ASSESSMENT & PLAN NOTE
She had hemorrhoids in the past that resolved with suppositories. Recently she developed some burning pain and itching with hemorrhoids. She used anusol and OTC treatments and the burning resolved but still has itching.   Will try suppository again.   Encouraged weight loss, hydration, regular exercise and fiber in diet.

## 2017-12-19 NOTE — ASSESSMENT & PLAN NOTE
Very pleasant 67 year old woman here to follow up after seeing her hematologist and pulmonologist regarding a small PE found on CT chest for shortness of breath. She is currently on anticoagulation with xarelto 20 mg, will discuss with her hematologist lowering dose to 10 mg and also continuation longterm on xarelto at follow up visit in April after repeat chest CT and LE ultrasound. Long term anticoagulation may be protective as she has factor V leiden mutation as do two of her daughters and a grand daughter, one who had a recent provoked DVT after knee surgery, though they are heterozygous for the mutation.

## 2017-12-19 NOTE — ASSESSMENT & PLAN NOTE
She still has episodes when the left ankle gives way, and sometimes has pain in her left ankle.   This started after a fall in the shower September 2014. She went to PT and did exercises for her ankle at home. She was evaluated by orthopedics and MRI. Imaging showed no osseous abnormality but does show chronic sprain of ligaments.     MRI 2014  1.  Chronic sprain of the distal anterior tibiofibular, calcaneofibular and posterior talofibular ligaments.  2.  Soft tissue swelling/edema involving the lateral aspect of the ankle.  3.  No evidence of osteochondral injury of the talar dome.  4.  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 This can be seen with posterior ankle impingement/os trigonum syndrome. There is however no other evidence of posterior ankle impingement.    Encouraged her to continue with stability exercises at home. Will refer to PT if needed.

## 2017-12-20 NOTE — PROGRESS NOTES
Subjective:   Hermelinda Mosley is a 67 y.o. female here today for evaluation and management of:     Factor V Leiden mutation (CMS-[facility])  Patient is heterozygous for Factor V Leiden. She also has two daughters who are positive for this.   She has no known hx of DVT but recent lower extremity superficial thrombosis and a small PE found on CT done for shortness of breath indicates possible undiagnosed DVT.   She is on xarelto for 6 months, currently on 20 mg. Was evaluated by her hematologist Dr. Cervantes and is going to have repeat US of extremity in April and has a CT scan for follow up. She also has follow up with pulmonology.   She is concerned about continuing long term anticoagulation, this will be on ongoing evaluation.   Her fasting homocysteine levels ordered by Dr. Cervantes also are elevated. I advised her to start folic acid, Vitamin B6 and B 12 and eat  Diet low in saturated fat, with plenty of fruit and vegetables.       Chronic kidney disease, stage 3  Recent GFR improved to >60  Continue with hydration, low salt diet.     Pulmonary embolism (CMS-[facility]) [I26.99]  Very pleasant 67 year old woman here to follow up after seeing her hematologist and pulmonologist regarding a small PE found on CT chest for shortness of breath. She is currently on anticoagulation with xarelto 20 mg, will discuss with her hematologist lowering dose to 10 mg and also continuation longterm on xarelto at follow up visit in April after repeat chest CT and LE ultrasound. Long term anticoagulation may be protective as she has factor V leiden mutation as do two of her daughters and a grand daughter, one who had a recent provoked DVT after knee surgery, though they are heterozygous for the mutation.     Left ankle instability  She still has episodes when the left ankle gives way, and sometimes has pain in her left ankle.   This started after a fall in the shower September 2014. She went to PT and did exercises for her ankle at home. She was  evaluated by orthopedics and MRI. Imaging showed no osseous abnormality but does show chronic sprain of ligaments.     MRI 2014  1.  Chronic sprain of the distal anterior tibiofibular, calcaneofibular and posterior talofibular ligaments.  2.  Soft tissue swelling/edema involving the lateral aspect of the ankle.  3.  No evidence of osteochondral injury of the talar dome.  4.  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 This can be seen with posterior ankle impingement/os trigonum syndrome. There is however no other evidence of posterior ankle impingement.    Encouraged her to continue with stability exercises at home. Will refer to PT if needed.     Other hemorrhoids  She had hemorrhoids in the past that resolved with suppositories. Recently she developed some burning pain and itching with hemorrhoids. She used anusol and OTC treatments and the burning resolved but still has itching.   Will try suppository again.   Encouraged weight loss, hydration, regular exercise and fiber in diet.          Current medicines (including changes today)  Current Outpatient Prescriptions   Medication Sig Dispense Refill   • folic acid (FOLVITE) 1 MG Tab Take 1 Tab by mouth every day. 30 Tab 3   • pyridoxine 100 MG tablet Take 1 Tab by mouth every day. 30 Tab 11   • hydrocortisone (ANUSOL-HC) 25 MG Suppos Insert 1 Suppository in rectum every 12 hours. 10 Suppository 3   • rivaroxaban (XARELTO) 20 MG Tab tablet Take 1 Tab by mouth with dinner. 90 Tab 1   • OMEGA-3 KRILL OIL PO Take 1 tablet by mouth every day.     • Lifitegrast (XIIDRA) 5 % Solution 1 Drop by Ophthalmic route 2 Times a Day.     • Multiple Vitamins-Minerals (MULTIVITAMIN PO) Take  by mouth.     • Cholecalciferol (VITAMIN D PO) Take  by mouth.     • Probiotic Product (PROBIOTIC DAILY PO) Take  by mouth.     • Ascorbic Acid (VITAMIN C PO) Take 500 mg by mouth every day.     • omeprazole (PRILOSEC) 20 MG  "delayed-release capsule Take 20 mg by mouth every day.     • albuterol 108 (90 BASE) MCG/ACT Aero Soln inhalation aerosol Inhale 2 Puffs by mouth every 6 hours as needed for Shortness of Breath. 8.5 g 3   • meloxicam (MOBIC) 15 MG tablet Take 1 Tab by mouth every day. 30 Tab 0     No current facility-administered medications for this visit.      She  has a past medical history of Abdominal pain, unspecified site; Alpha 1-antitrypsin PiMS phenotype; Back pain; Bronchitis; Chickenpox; Degeneration of lumbar or lumbosacral intervertebral disc; Diarrhea; DVT (deep venous thrombosis) (CMS-[facility]); Esophageal reflux; [place] measles; Hernia of unspecified site of abdominal cavity without mention of obstruction or gangrene (repaired); History of colonoscopy (2005=normal); Influenza; Irritable bowel syndrome; Mumps; Pap smear (due); Pneumonia; Recurrent UTI (9/4/2009); Restless leg syndrome; Screening mammogram (10/30/2008=normal); Superficial thrombophlebitis of left leg (10/20/2017); Swelling (9/4/2009); Tonsillitis; Unspecified hemorrhoids without mention of complication; and Vein, varicose (stripped). She also has no past medical history of Encounter for long-term (current) use of other medications.    ROS  No chest pain, no shortness of breath, no abdominal pain       Objective:     Blood pressure 128/72, pulse 74, temperature 36.8 °C (98.2 °F), resp. rate 16, height 1.575 m (5' 2\"), weight 74.4 kg (164 lb), SpO2 98 %. Body mass index is 30 kg/m².   Physical Exam:  Constitutional: Alert, no distress.  Skin: Warm, dry, good turgor, no rashes in visible areas.  Eye: Equal, round and reactive, conjunctiva clear, lids normal.  ENMT: Lips without lesions, good dentition, oropharynx clear.  Neck: Trachea midline, no masses, no thyromegaly. No cervical or supraclavicular lymphadenopathy  Respiratory: Unlabored respiratory effort, lungs clear to auscultation, no wheezes, no ronchi.  Cardiovascular: Normal S1, S2, no murmur, no " edema.  Abdomen: Soft, non-tender, no masses, no hepatosplenomegaly.  Psych: Alert and oriented x3, normal affect and mood.        Assessment and Plan:   The following treatment plan was discussed    1. Factor V Leiden mutation (CMS-HCC)  Stable on xarelto, follow up with hematology    2. Chronic kidney disease, stage 3  - COMP METABOLIC PANEL; Future    3. Other acute pulmonary embolism without acute cor pulmonale (CMS-HCC)  - folic acid (FOLVITE) 1 MG Tab; Take 1 Tab by mouth every day.  Dispense: 30 Tab; Refill: 3  - cyanocobalamin (VITAMIN B-12) injection 1,000 mcg; 1 mL by Intramuscular route Once.  - pyridoxine 100 MG tablet; Take 1 Tab by mouth every day.  Dispense: 30 Tab; Refill: 11    4. Obesity (BMI 30-39.9)  - Patient identified as having weight management issue.  Appropriate orders and counseling given.    5. Left ankle instability  Chronic, no changes    6. Other hemorrhoids  Rx anusol    7. Elevated homocysteine (CMS-HCC)  - folic acid (FOLVITE) 1 MG Tab; Take 1 Tab by mouth every day.  Dispense: 30 Tab; Refill: 3  - cyanocobalamin (VITAMIN B-12) injection 1,000 mcg; 1 mL by Intramuscular route Once.  - pyridoxine 100 MG tablet; Take 1 Tab by mouth every day.  Dispense: 30 Tab; Refill: 11      Followup: Return in about 5 months (around 5/19/2018) for chronic anticoagulation, CKD, ankle instability.

## 2018-03-22 ENCOUNTER — TELEPHONE (OUTPATIENT)
Dept: HEMATOLOGY ONCOLOGY | Facility: MEDICAL CENTER | Age: 68
End: 2018-03-22

## 2018-03-22 ENCOUNTER — TELEPHONE (OUTPATIENT)
Dept: PULMONOLOGY | Facility: HOSPICE | Age: 68
End: 2018-03-22

## 2018-03-22 NOTE — TELEPHONE ENCOUNTER
Pt calling with questions about taking her Xeralto. She states she keeps forgetting to take it at night, but she would like to know if she can take it in the morning instead because it will be easier for her to remember. Spoke with Destiny and since the Xeralto has been prescribed by another MD we have asked her to speak with that MD. Pt voiced understanding.

## 2018-03-22 NOTE — TELEPHONE ENCOUNTER
Patient called our office today with questions about her Xeralto. She said that she is currently taking it in the evenings and missed her dose last night. She says that she took her Tuesday night dose but wants to know if it would be alright for her to take this medication in the morning versus the evening so it is easier to remember. She would also like to know if she should wait until tonight to take her next dose or if she could take it this morning.

## 2018-03-22 NOTE — TELEPHONE ENCOUNTER
Spoke to ENEDELIA Reese regarding this patient's Xarelto rx. Per Kenyetta, it is OK for the patient to transition to a morning schedule. Patient will take her Xarelto now and then start her morning regimen tomorrow morning. Updated Dr. Cervantes who will address sending her to the anticoagulation clinic at her next f/v on 4/19/2018. Verified that the patient has enough Xarelto to make it to her follow-up visit. Patient denies any further questions or concerns.

## 2018-04-09 ENCOUNTER — HOSPITAL ENCOUNTER (OUTPATIENT)
Dept: LAB | Facility: MEDICAL CENTER | Age: 68
End: 2018-04-09
Attending: INTERNAL MEDICINE
Payer: MEDICARE

## 2018-04-09 LAB
ANION GAP SERPL CALC-SCNC: 5 MMOL/L (ref 0–11.9)
BUN SERPL-MCNC: 16 MG/DL (ref 8–22)
CALCIUM SERPL-MCNC: 9.5 MG/DL (ref 8.5–10.5)
CHLORIDE SERPL-SCNC: 105 MMOL/L (ref 96–112)
CO2 SERPL-SCNC: 29 MMOL/L (ref 20–33)
CREAT SERPL-MCNC: 1.07 MG/DL (ref 0.5–1.4)
GLUCOSE SERPL-MCNC: 70 MG/DL (ref 65–99)
POTASSIUM SERPL-SCNC: 4.1 MMOL/L (ref 3.6–5.5)
SODIUM SERPL-SCNC: 139 MMOL/L (ref 135–145)

## 2018-04-09 PROCEDURE — 36415 COLL VENOUS BLD VENIPUNCTURE: CPT

## 2018-04-09 PROCEDURE — 80048 BASIC METABOLIC PNL TOTAL CA: CPT

## 2018-04-10 ENCOUNTER — HOSPITAL ENCOUNTER (OUTPATIENT)
Dept: RADIOLOGY | Facility: MEDICAL CENTER | Age: 68
End: 2018-04-10
Attending: INTERNAL MEDICINE
Payer: MEDICARE

## 2018-04-10 DIAGNOSIS — I26.99 OTHER ACUTE PULMONARY EMBOLISM WITHOUT ACUTE COR PULMONALE (HCC): ICD-10-CM

## 2018-04-10 DIAGNOSIS — D68.51 FACTOR V LEIDEN MUTATION (HCC): ICD-10-CM

## 2018-04-10 PROCEDURE — 93971 EXTREMITY STUDY: CPT | Mod: LT

## 2018-04-10 PROCEDURE — 71275 CT ANGIOGRAPHY CHEST: CPT

## 2018-04-10 PROCEDURE — 700117 HCHG RX CONTRAST REV CODE 255: Performed by: INTERNAL MEDICINE

## 2018-04-10 RX ADMIN — IOHEXOL 100 ML: 350 INJECTION, SOLUTION INTRAVENOUS at 10:50

## 2018-04-19 ENCOUNTER — OFFICE VISIT (OUTPATIENT)
Dept: PULMONOLOGY | Facility: HOSPICE | Age: 68
End: 2018-04-19
Payer: MEDICARE

## 2018-04-19 ENCOUNTER — OFFICE VISIT (OUTPATIENT)
Dept: HEMATOLOGY ONCOLOGY | Facility: MEDICAL CENTER | Age: 68
End: 2018-04-19
Payer: MEDICARE

## 2018-04-19 VITALS
HEART RATE: 70 BPM | HEIGHT: 62 IN | BODY MASS INDEX: 31.38 KG/M2 | WEIGHT: 170.53 LBS | OXYGEN SATURATION: 98 % | TEMPERATURE: 98.2 F | RESPIRATION RATE: 16 BRPM | DIASTOLIC BLOOD PRESSURE: 72 MMHG | SYSTOLIC BLOOD PRESSURE: 116 MMHG

## 2018-04-19 VITALS
WEIGHT: 170.3 LBS | TEMPERATURE: 97.5 F | OXYGEN SATURATION: 95 % | HEART RATE: 80 BPM | DIASTOLIC BLOOD PRESSURE: 78 MMHG | RESPIRATION RATE: 14 BRPM | BODY MASS INDEX: 31.34 KG/M2 | HEIGHT: 62 IN | SYSTOLIC BLOOD PRESSURE: 120 MMHG

## 2018-04-19 DIAGNOSIS — Z86.711 HISTORY OF PULMONARY EMBOLISM: ICD-10-CM

## 2018-04-19 DIAGNOSIS — D68.51 FACTOR V LEIDEN MUTATION (HCC): ICD-10-CM

## 2018-04-19 DIAGNOSIS — Z14.8 ALPHA-1-ANTITRYPSIN DEFICIENCY CARRIER: ICD-10-CM

## 2018-04-19 DIAGNOSIS — I26.99 OTHER ACUTE PULMONARY EMBOLISM WITHOUT ACUTE COR PULMONALE (HCC): ICD-10-CM

## 2018-04-19 DIAGNOSIS — Z86.718 HISTORY OF DVT (DEEP VEIN THROMBOSIS): ICD-10-CM

## 2018-04-19 PROCEDURE — 99214 OFFICE O/P EST MOD 30 MIN: CPT | Performed by: INTERNAL MEDICINE

## 2018-04-19 ASSESSMENT — PAIN SCALES - GENERAL: PAINLEVEL: NO PAIN

## 2018-04-19 NOTE — PROGRESS NOTES
Date of visit: 4/19/2018  11:50 AM      Chief Complaint- Factor V Leiden heterozygous mutation with history of PE      Identification/Prior relevant history:   presented with symptomatic saphenous vein thrombophlebitis extending around 5 cm according to the ultrasound back from 9/12/2017. She has prior history of varicosities and vein stripping. A short follow-up ultrasound continued to show persistent thrombus in the great saphenous vein . She then developed shortness of breath and had a CT angiogram of the lungs done on 9:15, which showed a small nonocclusive right lower lobe PE. She had some groundglass bilateral upper lobe opacities. She had small bilateral pulmonary nodules .  She was tested heterozygous factor V Leiden mutation positive. Protein C levels were actually elevated.    . She has significant family history of heterozygous factor V Leiden mutation. Her daughter who is accompanying her also developed provoked PE in the postoperative setting. The patient Is alpha one antitrypsin PI MS phenotype. She is being followed up by pulmonary    Interim history-  /[id number] CT angiogram of the chest shows resolution of the PE. She is otherwise feeling at her baseline. She is tolerating tamoxifen well with no bleeding issues. She is contracted about continuing long-term anticoagulation.    No evidence of left  lower extremity deep venous thrombosis. Previously described thrombus in a branch of the greater saphenous vein is not appreciated on the current exam.    Past Medical History:      Past Medical History:   Diagnosis Date   • Abdominal pain, unspecified site    • Alpha 1-antitrypsin PiMS phenotype    • Back pain    • Bronchitis    • Chickenpox    • Degeneration of lumbar or lumbosacral intervertebral disc    • Diarrhea    • DVT (deep venous thrombosis) (CMS-HCC)    • Esophageal reflux    • [place] measles    • Hernia of unspecified site of abdominal cavity without mention of obstruction or gangrene  repaired   • History of colonoscopy 2005=normal    Dr. [facility]> [facility]   • Influenza    • Irritable bowel syndrome    • Mumps    • Pap smear due   • Pneumonia    • Recurrent UTI 9/4/2009   • Restless leg syndrome    • Screening mammogram 10/30/2008=normal   • Superficial thrombophlebitis of left leg 10/20/2017   • Swelling 9/4/2009   • Tonsillitis    • Unspecified hemorrhoids without mention of complication    • Vein, varicose stripped       Past surgical history:       Past Surgical History:   Procedure Laterality Date   • CHOLECYSTECTOMY     • HERNIA REPAIR     • VEIN STRIPPING         Allergies:       Patient has no known allergies.    Medications:         Current Outpatient Prescriptions   Medication Sig Dispense Refill   • folic acid (FOLVITE) 1 MG Tab Take 1 Tab by mouth every day. 30 Tab 3   • pyridoxine 100 MG tablet Take 1 Tab by mouth every day. 30 Tab 11   • hydrocortisone (ANUSOL-HC) 25 MG Suppos Insert 1 Suppository in rectum every 12 hours. 10 Suppository 3   • rivaroxaban (XARELTO) 20 MG Tab tablet Take 1 Tab by mouth with dinner. 90 Tab 1   • OMEGA-3 KRILL OIL PO Take 1 tablet by mouth every day.     • Lifitegrast (XIIDRA) 5 % Solution 1 Drop by Ophthalmic route 2 Times a Day.     • albuterol 108 (90 BASE) MCG/ACT Aero Soln inhalation aerosol Inhale 2 Puffs by mouth every 6 hours as needed for Shortness of Breath. 8.5 g 3   • Multiple Vitamins-Minerals (MULTIVITAMIN PO) Take  by mouth.     • Cholecalciferol (VITAMIN D PO) Take  by mouth.     • Probiotic Product (PROBIOTIC DAILY PO) Take  by mouth.     • Ascorbic Acid (VITAMIN C PO) Take 500 mg by mouth every day.     • meloxicam (MOBIC) 15 MG tablet Take 1 Tab by mouth every day. 30 Tab 0   • omeprazole (PRILOSEC) 20 MG delayed-release capsule Take 20 mg by mouth every day.       No current facility-administered medications for this visit.          Social History:     Social History     Social History   • Marital status:      Spouse  "name: N/A   • Number of children: N/A   • Years of education: N/A     Occupational History   • Not on file.     Social History Main Topics   • Smoking status: Former Smoker     Packs/day: 0.25     Years: 4.00     Types: Cigarettes     Quit date: 1/1/1969   • Smokeless tobacco: Never Used      Comment: only smokes 1-2 cigs a couple times a week for 1-2 years   • Alcohol use 0.0 - 1.2 oz/week      Comment: occasional   • Drug use: No   • Sexual activity: Yes     Partners: Male      Comment:      Other Topics Concern   • Not on file     Social History Narrative   • No narrative on file       Family History:      Family History   Problem Relation Age of Onset   • Hypertension Mother    • Diabetes Mother      pre diabetes   • Diabetes Father    • Hypertension Father    • Kidney Disease Father    • Respiratory Disease Brother        Review of Systems:  All other review of systems are negative except what was mentioned above in the HPI.    Constitutional: Negative for fever, chills, weight loss and malaise/fatigue.    HEENT: No new auditory or visual complaints. No sore throat and neck pain.     Respiratory: Negative for cough, sputum production, shortness of breath and wheezing.    Cardiovascular: Negative for chest pain, palpitations, orthopnea and leg swelling.    Gastrointestinal: Negative for heartburn, nausea, vomiting and abdominal pain.    Genitourinary: Negative for dysuria, hematuria    Musculoskeletal: No new arthralgias or myalgias   Skin: Negative for rash and itching.    Neurological: Negative for focal weakness and headaches.    Endo/Heme/Allergies: No abnormal bleed/bruise.    Psychiatric/Behavioral: No new depression/anxiety.    Physical Exam:  Vitals: /72   Pulse 70   Temp 36.8 °C (98.2 °F)   Resp 16   Ht 1.575 m (5' 2\")   Wt 77.3 kg (170 lb 8.4 oz)   SpO2 98%   BMI 31.19 kg/m²     General: Not in acute distress, alert and oriented x 3  HEENT: No pallor, icterus. Oropharynx clear. "   Neck: Supple, no palpable masses.  Lymph nodes: No palpable cervical, supraclavicular, axillary or inguinal lymphadenopathy.    CVS: regular rate and rhythm, no rubs or gallops  RESP: Clear to auscultate bilaterally, no wheezing or crackles.   ABD: Soft, non tender, non distended, positive bowel sounds, no palpable organomegaly  EXT: No edema or cyanosis  CNS: Alert and oriented x3, No focal deficits.  Skin- No rash      Labs:   No visits with results within 1 Week(s) from this visit.   Latest known visit with results is:   Hospital Outpatient Visit on 04/09/2018   Component Date Value Ref Range Status   • Sodium 04/09/2018 139  135 - 145 mmol/L Final   • Potassium 04/09/2018 4.1  3.6 - 5.5 mmol/L Final   • Chloride 04/09/2018 105  96 - 112 mmol/L Final   • Co2 04/09/2018 29  20 - 33 mmol/L Final   • Glucose 04/09/2018 70  65 - 99 mg/dL Final   • Bun 04/09/2018 16  8 - 22 mg/dL Final   • Creatinine 04/09/2018 1.07  0.50 - 1.40 mg/dL Final   • Calcium 04/09/2018 9.5  8.5 - 10.5 mg/dL Final   • Anion Gap 04/09/2018 5.0  0.0 - 11.9 Final   • GFR If  04/09/2018 >60  >60 mL/min/1.73 m 2 Final   • GFR If Non  04/09/2018 51* >60 mL/min/1.73 m 2 Final             Assessment and Plan:  Unprovoked saphenous vein thrombophlebitis and incidental finding of low volume, PE in a patient with heterozygous factor V Leiden mutation. She does not have obvious triggering factors like obesity, recent surgery or long distance travel.  . She has been on anticoagulation with rituximab for 6 months. CT scan shows resolution of prior PE. She has significant family history of venous thromboembolism.Given the unprovoked nature, heterozygous factor V Leiden mutation, and significant family history of venous thrombolysis on, I am inclined to recommend long term anti-coagulation. . She is currently not smoking. Also discussed the data for trying lower dose Xarelto 10 mg in patients with equipoise after 6 months  of anticoagulation.    Since she is not having any adverse bleeding issues from the xarelto, recommend long-term anticoagulation. She may also have a higher risk of future VTE secondary to prior varicose vein stripping and also from alpha-1 antitrypsin phenotype., However, if she develops any significant bleeding issues, she should naturally come off of anticoagulation.    She agreed and verbalized  agreement and understanding with the current plan.  I answered all questions and concerns at this time         Please note that this dictation was created using voice recognition software. I have made every reasonable attempt to correct obvious errors, but I expect that there are errors of grammar and possibly content that I did not discover before finalizing the note.      SIGNATURES:  Vitaly Cervantes    CC:  Maribel Long M.D.  No ref. provider found

## 2018-04-19 NOTE — PROGRESS NOTES
Chief Complaint   Patient presents with   • Results     CT results     HPI: This patient is a 67 y.o. female who returns for DVT/PE. She was in her usual state of good health until May 2017 when she noted mild exertional dyspnea and chest discomfort. She saw her cardiologist and underwent an exercise treadmill test which was normal. Following her treadmill test, she noted left leg edema, and underwent duplex ultrasound showing an acute thrombus of the left greater saphenous vein. Subsequent CAT scan September 15, 2017 confirmed a small right lower lobe pulmonary embolism. Additionally there was associated scattered groundglass opacities, and tree-in-bud nodularity. She has been asymptomatic. She has factor V Leiden deficiency, had superficial phlebitis in 1976, however denies any prior history of deep venous thrombosis or pulmonary embolism. She is on Xarelto, follows with, Dr. Cervantes, hematologist. Pulmonary function testing show normal lung function and DLCO. Alpha-1 antitrypsin level was low at 78 mg/dL with MZ phenotype.  Follow-up ultrasound of the lower extremities on April 10 showed resolution of DVT. Chest CAT scan also shows resolution of PE and groundglass, nodular opacities.       Past Medical History:   Diagnosis Date   • Abdominal pain, unspecified site    • Alpha 1-antitrypsin PiMS phenotype    • Back pain    • Bronchitis    • Chickenpox    • Degeneration of lumbar or lumbosacral intervertebral disc    • Diarrhea    • DVT (deep venous thrombosis) (CMS-HCC)    • Esophageal reflux    • [place] measles    • Hernia of unspecified site of abdominal cavity without mention of obstruction or gangrene repaired   • History of colonoscopy 2005=normal    Dr. [facility]> [facility]   • Influenza    • Irritable bowel syndrome    • Mumps    • Pap smear due   • Pneumonia    • Recurrent UTI 9/4/2009   • Restless leg syndrome    • Screening mammogram 10/30/2008=normal   • Superficial thrombophlebitis of left leg  10/20/2017   • Swelling 9/4/2009   • Tonsillitis    • Unspecified hemorrhoids without mention of complication    • Vein, varicose stripped       Social History     Social History   • Marital status:      Spouse name: N/A   • Number of children: N/A   • Years of education: N/A     Occupational History   • Not on file.     Social History Main Topics   • Smoking status: Former Smoker     Packs/day: 0.25     Years: 4.00     Types: Cigarettes     Quit date: 1/1/1969   • Smokeless tobacco: Never Used      Comment: only smokes 1-2 cigs a couple times a week for 1-2 years   • Alcohol use 0.0 - 1.2 oz/week      Comment: occasional   • Drug use: No   • Sexual activity: Yes     Partners: Male      Comment:      Other Topics Concern   • Not on file     Social History Narrative   • No narrative on file       Family History   Problem Relation Age of Onset   • Hypertension Mother    • Diabetes Mother      pre diabetes   • Diabetes Father    • Hypertension Father    • Kidney Disease Father    • Respiratory Disease Brother        Current Outpatient Prescriptions on File Prior to Visit   Medication Sig Dispense Refill   • folic acid (FOLVITE) 1 MG Tab Take 1 Tab by mouth every day. 30 Tab 3   • rivaroxaban (XARELTO) 20 MG Tab tablet Take 1 Tab by mouth with dinner. 90 Tab 1   • OMEGA-3 KRILL OIL PO Take 1 tablet by mouth every day.     • Lifitegrast (XIIDRA) 5 % Solution 1 Drop by Ophthalmic route 2 Times a Day.     • Multiple Vitamins-Minerals (MULTIVITAMIN PO) Take  by mouth.     • Cholecalciferol (VITAMIN D PO) Take  by mouth.     • Probiotic Product (PROBIOTIC DAILY PO) Take  by mouth.     • Ascorbic Acid (VITAMIN C PO) Take 500 mg by mouth every day.     • omeprazole (PRILOSEC) 20 MG delayed-release capsule Take 20 mg by mouth every day.     • pyridoxine 100 MG tablet Take 1 Tab by mouth every day. 30 Tab 11   • hydrocortisone (ANUSOL-HC) 25 MG Suppos Insert 1 Suppository in rectum every 12 hours. 10 Suppository 3  "  • albuterol 108 (90 BASE) MCG/ACT Aero Soln inhalation aerosol Inhale 2 Puffs by mouth every 6 hours as needed for Shortness of Breath. 8.5 g 3   • meloxicam (MOBIC) 15 MG tablet Take 1 Tab by mouth every day. 30 Tab 0     No current facility-administered medications on file prior to visit.        Allergies: Patient has no known allergies.    ROS:   Constitutional: Denies fevers, chills, night sweats, fatigue or weight loss  Eyes: Denies vision loss, pain, drainage, double vision  Ears, Nose, Throat: Denies earache, difficulty hearing, tinnitus, nasal congestion, hoarseness  Cardiovascular: Denies chest pain, tightness, palpitations, orthopnea or edema  Respiratory: Denies shortness of breath, cough, wheezing, hemoptysis  Sleep: Denies daytime sleepiness, snoring, apneas, insomnia, morning headaches  GI: Denies heartburn, dysphagia, nausea, abdominal pain, diarrhea or constipation  : Denies frequent urination, hematuria, discharge or painful urination  Musculoskeletal: Denies back pain, painful joints, sore muscles  Neurological: Denies weakness or headaches  Skin: No rashes    Blood pressure 120/78, pulse 80, temperature 36.4 °C (97.5 °F), resp. rate 14, height 1.575 m (5' 2\"), weight 77.2 kg (170 lb 4.8 oz), SpO2 95 %.    Physical Exam:  Appearance: Well-nourished, well-developed, in no acute distress  HEENT: Normocephalic, atraumatic, white sclera, PERRLA, oropharynx clear  Neck: No adenopathy or masses  Respiratory: no intercostal retractions or accessory muscle use  Lungs auscultation: Clear to auscultation bilaterally  Cardiovascular: Regular rate rhythm. No murmurs, rubs or gallops.  No LE edema  Abdomen: soft, nondistended  Gait: Normal  Digits: No clubbing, cyanosis  Motor: No focal deficits  Orientation: Oriented to time, person and place    Diagnosis:  1. History of pulmonary embolism      resolved     2. History of DVT (deep vein thrombosis)      resolved   3. Factor V Leiden mutation (CMS-[facility])   "   4. Alpha-1-antitrypsin deficiency carrier (CMS-HCC)  AMB PULMONARY FUNCTION TEST/LAB       Plan:  The patient's DVT and pulmonary embolism have resolved. Her nodular opacities have also resolved consistent with inflammatory etiology. She has factor V Leiden mutation, on Xarelto. She is pending follow-up with Dr. Cervantes regarding duration of anticoagulation.  She is heterozygous for alpha-1 antitrypsin deficiency, with normal pulmonary function. Recommend annual PFTs for follow-up.  Return in about 6 months (around 10/19/2018) for with PFT.

## 2018-04-20 ENCOUNTER — ANTICOAGULATION VISIT (OUTPATIENT)
Dept: MEDICAL GROUP | Facility: PHYSICIAN GROUP | Age: 68
End: 2018-04-20
Payer: MEDICARE

## 2018-04-20 ENCOUNTER — HOSPITAL ENCOUNTER (OUTPATIENT)
Dept: LAB | Facility: MEDICAL CENTER | Age: 68
End: 2018-04-20
Attending: FAMILY MEDICINE
Payer: MEDICARE

## 2018-04-20 DIAGNOSIS — N18.30 CHRONIC KIDNEY DISEASE, STAGE 3 (HCC): ICD-10-CM

## 2018-04-20 DIAGNOSIS — Z79.01 LONG TERM CURRENT USE OF ANTICOAGULANT THERAPY: Primary | ICD-10-CM

## 2018-04-20 DIAGNOSIS — D68.51 FACTOR V LEIDEN MUTATION (HCC): ICD-10-CM

## 2018-04-20 LAB
ALBUMIN SERPL BCP-MCNC: 4.4 G/DL (ref 3.2–4.9)
ALBUMIN/GLOB SERPL: 1.6 G/DL
ALP SERPL-CCNC: 65 U/L (ref 30–99)
ALT SERPL-CCNC: 22 U/L (ref 2–50)
ANION GAP SERPL CALC-SCNC: 7 MMOL/L (ref 0–11.9)
AST SERPL-CCNC: 20 U/L (ref 12–45)
BILIRUB SERPL-MCNC: 0.4 MG/DL (ref 0.1–1.5)
BUN SERPL-MCNC: 20 MG/DL (ref 8–22)
CALCIUM SERPL-MCNC: 9.7 MG/DL (ref 8.5–10.5)
CHLORIDE SERPL-SCNC: 106 MMOL/L (ref 96–112)
CO2 SERPL-SCNC: 28 MMOL/L (ref 20–33)
CREAT SERPL-MCNC: 1.06 MG/DL (ref 0.5–1.4)
GLOBULIN SER CALC-MCNC: 2.8 G/DL (ref 1.9–3.5)
GLUCOSE SERPL-MCNC: 84 MG/DL (ref 65–99)
POTASSIUM SERPL-SCNC: 4.6 MMOL/L (ref 3.6–5.5)
PROT SERPL-MCNC: 7.2 G/DL (ref 6–8.2)
SODIUM SERPL-SCNC: 141 MMOL/L (ref 135–145)

## 2018-04-20 PROCEDURE — 99211 OFF/OP EST MAY X REQ PHY/QHP: CPT | Performed by: NURSE PRACTITIONER

## 2018-04-20 PROCEDURE — 36415 COLL VENOUS BLD VENIPUNCTURE: CPT

## 2018-04-20 PROCEDURE — 80053 COMPREHEN METABOLIC PANEL: CPT

## 2018-04-20 NOTE — PROGRESS NOTES
Anticoagulation Summary  As of 4/20/2018    INR goal:      TTR:   --   Today's INR:   No new INR was available at the time of this encounter.   Maintenance plan:   No maintenance plan   Plan last modified:   Fernando Charles, PharmD (9/22/2017)   Next INR check:   4/27/2018   Target end date:   Indefinite    Indications    Factor V Leiden mutation (CMS-HCC) [D68.51]  Pulmonary embolism (CMS-HCC) [I26.99] [I26.99]  Long term (current) use of anticoagulants [Z79.01] [Z79.01]             Anticoagulation Episode Summary     INR check location:       Preferred lab:       Send INR reminders to:       Comments:   Xarelto       Anticoagulation Care Providers     Provider Role Specialty Phone number    [facility] Responsible  [phone]    Maribel Long M.D. Responsible Family Medicine [phone]        Anticoagulation Patient Findings      HPI:  Hermelinda Mosley seen in clinic today, on anticoagulation therapy with Xarelto for PE  Reason for today's visit (per our collaborative practice policy) is because pt had follow up with specialists to discuss length of therapy of Xarelto.   Intervention at the last visit:   Changes to current medical/health status since last appt: saw PULM and MARTY - notes aren't complete in Epic yet, however per pt, states that HEME said she could remain on Xarelto if she wanted, or drop to a 10mg dose for 6 months and then stop if she wanted.   NO - signs/symptoms of bleeding and/or thrombosis since the last appt.    NO - interval changes to diet or any interval changes to medications since last appt.   NO - complications or cost restrictions with current therapy.   BP declined    A/P   Pt isn't sure want to do regarding her therapy at this time. She feels that the HEME didn't give her much direction as to what is best. US did confirm that the clots are no longer present. Pt has been on Xarelto for 6 months now and is looking for advise what is best to do moving  forward.     At this point HEME note isn't complete, however will ask for Dr. Bloch's recommendation.    Other info:  Pt educated to contact our clinic with any changes in medications or s/s of bleeding or thrombosis  CHEST guidelines recommend frequent INR monitoring at regular intervals (a few days up to a max of 12 weeks) to ensure they are on the proper dose of warfarin and not having any complications from therapy. INRs can dramatically change over a short time period due to diet, medications, and medical conditions.     Dionna Diaz, PharmD

## 2018-04-22 ENCOUNTER — TELEPHONE (OUTPATIENT)
Dept: VASCULAR LAB | Facility: MEDICAL CENTER | Age: 68
End: 2018-04-22

## 2018-04-22 PROBLEM — I26.99 PULMONARY EMBOLISM (HCC): Status: RESOLVED | Noted: 2017-09-22 | Resolved: 2018-04-22

## 2018-04-23 ENCOUNTER — TELEPHONE (OUTPATIENT)
Dept: VASCULAR LAB | Facility: MEDICAL CENTER | Age: 68
End: 2018-04-23

## 2018-04-23 DIAGNOSIS — Z79.01 LONG TERM CURRENT USE OF ANTICOAGULANT THERAPY: ICD-10-CM

## 2018-04-23 NOTE — TELEPHONE ENCOUNTER
Patient has been on anticoagulation for six months with xarelto for a recurrent superficial vein thrombosis and small unprovoked PE and heterozygous for factor V leiden.    Based on this history, seems reasonable to continue with indefinite xarelto at a dose of 10 mg daily based on [facility] CHOICE study as recommended by [facility].  This would be a IIb indication.    If she would like to discuss the risks and benefits of extended anticoagulation she can be seen in vascular medicine clinic.    Will defer any indicated age appropriate screening for occult malignancy to pcp.    Michael Bloch, MD  Anticoagulation Clinic    Cc:  LINDSAY Inman

## 2018-04-23 NOTE — TELEPHONE ENCOUNTER
Spoke with pt.   She will finish out her Xarelto 20mg and then switch to the 10mg tablets daily.   Ordered those through the mail order pharmacy as per pt request.   Pt requests f/u with DR. Bloch or Jeremias.     Dionna Diaz, AliciaD

## 2018-05-03 ENCOUNTER — TELEPHONE (OUTPATIENT)
Dept: MEDICAL GROUP | Facility: PHYSICIAN GROUP | Age: 68
End: 2018-05-03

## 2018-05-03 DIAGNOSIS — Z12.4 CERVICAL CANCER SCREENING: ICD-10-CM

## 2018-05-03 NOTE — TELEPHONE ENCOUNTER
1. Caller Name: Hermelinda Mosley                                         Call Back Number: [phone] (home)       Patient approves a detailed voicemail message: yes    2. SPECIFIC Action To Be Taken: Referral pending, please sign.    3. Diagnosis/Clinical Reason for Request: PAP    4. Specialty & Provider Name/Lab/Imaging Location: [facility]    5. Is appointment scheduled for requested order/referral: no    Patient informed they will receive a return phone call from the office ONLY if there are any questions before processing their request. Advised to call back if they haven't received a call from the referral department in 5 days.

## 2018-05-09 NOTE — TELEPHONE ENCOUNTER
Referral to ob/gyn for PAP signed,   Did she not want me to do the pap for her in clinic?  Thank you,   Maribel Long M.D.

## 2018-05-10 NOTE — TELEPHONE ENCOUNTER
Patient notified and states that she always goes to OBGYN. Patient had a hard time hearing so I hope she understood the provider notes clearly.

## 2018-05-11 ENCOUNTER — ANTICOAGULATION MONITORING (OUTPATIENT)
Dept: MEDICAL GROUP | Facility: PHYSICIAN GROUP | Age: 68
End: 2018-05-11

## 2018-05-11 DIAGNOSIS — Z79.01 LONG TERM CURRENT USE OF ANTICOAGULANT THERAPY: ICD-10-CM

## 2018-05-11 DIAGNOSIS — D68.51 FACTOR V LEIDEN MUTATION (HCC): ICD-10-CM

## 2018-05-11 NOTE — PROGRESS NOTES
Pt is out of Xarelto, and is waiting for the 10mg to come in the mail.   Provided pt with Xarelto 20mg samples, to use until the 10mg come.   This is the dose she has been taking.     Dionna Diaz, PharmD

## 2018-06-14 ENCOUNTER — HOSPITAL ENCOUNTER (OUTPATIENT)
Dept: LAB | Facility: MEDICAL CENTER | Age: 68
End: 2018-06-14
Attending: FAMILY MEDICINE
Payer: MEDICARE

## 2018-06-14 ENCOUNTER — OFFICE VISIT (OUTPATIENT)
Dept: MEDICAL GROUP | Facility: PHYSICIAN GROUP | Age: 68
End: 2018-06-14
Payer: MEDICARE

## 2018-06-14 VITALS
BODY MASS INDEX: 31.65 KG/M2 | RESPIRATION RATE: 16 BRPM | WEIGHT: 172 LBS | HEIGHT: 62 IN | SYSTOLIC BLOOD PRESSURE: 126 MMHG | DIASTOLIC BLOOD PRESSURE: 74 MMHG | HEART RATE: 74 BPM | TEMPERATURE: 98.2 F | OXYGEN SATURATION: 95 %

## 2018-06-14 DIAGNOSIS — R53.83 FATIGUE, UNSPECIFIED TYPE: ICD-10-CM

## 2018-06-14 DIAGNOSIS — Z12.39 BREAST CANCER SCREENING: ICD-10-CM

## 2018-06-14 DIAGNOSIS — D68.51 FACTOR V LEIDEN MUTATION (HCC): ICD-10-CM

## 2018-06-14 DIAGNOSIS — E66.9 OBESITY (BMI 30-39.9): ICD-10-CM

## 2018-06-14 DIAGNOSIS — K21.00 GASTROESOPHAGEAL REFLUX DISEASE WITH ESOPHAGITIS: ICD-10-CM

## 2018-06-14 LAB — VIT B12 SERPL-MCNC: 545 PG/ML (ref 211–911)

## 2018-06-14 PROCEDURE — 99214 OFFICE O/P EST MOD 30 MIN: CPT | Performed by: FAMILY MEDICINE

## 2018-06-14 PROCEDURE — 36415 COLL VENOUS BLD VENIPUNCTURE: CPT

## 2018-06-14 PROCEDURE — 82607 VITAMIN B-12: CPT

## 2018-06-14 ASSESSMENT — PATIENT HEALTH QUESTIONNAIRE - PHQ9: CLINICAL INTERPRETATION OF PHQ2 SCORE: 0

## 2018-06-14 NOTE — ASSESSMENT & PLAN NOTE
Had EGD done  Showed 1 cm hiatal hernia no evidence of barretts  She continues on prilosec.   Has follow up with GI to follow up on biopsies.

## 2018-06-14 NOTE — ASSESSMENT & PLAN NOTE
She is going to follow up with anticoagulation clinic regarding continuation of xarelto.   Elevated fasting homocysteine levels. Encouraged to take folic acid B6 and B12 and eat diet low in saturated fat with plenty of fruit and vegetables.

## 2018-06-14 NOTE — PROGRESS NOTES
Subjective:   Hermelinda Mosley is a 67 y.o. female here today for evaluation and management of:     Esophageal Reflux  Had EGD done  Showed 1 cm hiatal hernia no evidence of barretts  She continues on prilosec.   Has follow up with GI to follow up on biopsies.     Factor V Leiden mutation (CMS-HCC)  She is going to follow up with anticoagulation clinic regarding continuation of xarelto.   Elevated fasting homocysteine levels. Encouraged to take folic acid B6 and B12 and eat diet low in saturated fat with plenty of fruit and vegetables.          Current medicines (including changes today)  Current Outpatient Prescriptions   Medication Sig Dispense Refill   • rivaroxaban (XARELTO) 10 MG Tab tablet Take 1 Tab by mouth with dinner. 90 Tab 3   • folic acid (FOLVITE) 1 MG Tab Take 1 Tab by mouth every day. 30 Tab 3   • pyridoxine 100 MG tablet Take 1 Tab by mouth every day. 30 Tab 11   • OMEGA-3 KRILL OIL PO Take 1 tablet by mouth every day.     • Lifitegrast (XIIDRA) 5 % Solution 1 Drop by Ophthalmic route 2 Times a Day.     • Multiple Vitamins-Minerals (MULTIVITAMIN PO) Take  by mouth.     • Cholecalciferol (VITAMIN D PO) Take  by mouth.     • Probiotic Product (PROBIOTIC DAILY PO) Take  by mouth.     • Ascorbic Acid (VITAMIN C PO) Take 500 mg by mouth every day.     • meloxicam (MOBIC) 15 MG tablet Take 1 Tab by mouth every day. 30 Tab 0   • omeprazole (PRILOSEC) 20 MG delayed-release capsule Take 20 mg by mouth every day.     • hydrocortisone (ANUSOL-HC) 25 MG Suppos Insert 1 Suppository in rectum every 12 hours. 10 Suppository 3   • albuterol 108 (90 BASE) MCG/ACT Aero Soln inhalation aerosol Inhale 2 Puffs by mouth every 6 hours as needed for Shortness of Breath. 8.5 g 3     No current facility-administered medications for this visit.      She  has a past medical history of Abdominal pain, unspecified site; Alpha 1-antitrypsin PiMS phenotype; Back pain; Bronchitis; Chickenpox; Degeneration of lumbar or lumbosacral  "intervertebral disc; Diarrhea; DVT (deep venous thrombosis) (HCC); Esophageal reflux; [place] measles; Hernia of unspecified site of abdominal cavity without mention of obstruction or gangrene (repaired); History of colonoscopy (2005=normal); Influenza; Irritable bowel syndrome; Mumps; Pap smear (due); Pneumonia; Recurrent UTI (9/4/2009); Restless leg syndrome; Screening mammogram (10/30/2008=normal); Superficial thrombophlebitis of left leg (10/20/2017); Swelling (9/4/2009); Tonsillitis; Unspecified hemorrhoids without mention of complication; and Vein, varicose (stripped). She also has no past medical history of Encounter for long-term (current) use of other medications.    ROS  No chest pain, no shortness of breath, no abdominal pain       Objective:     Pulse 74, temperature 36.8 °C (98.2 °F), resp. rate 16, height 1.575 m (5' 2\"), weight 78 kg (172 lb), SpO2 95 %. Body mass index is 31.46 kg/m².   Physical Exam:  Constitutional: Alert, no distress.  Skin: Warm, dry, good turgor, no rashes in visible areas.  Eye: Equal, round and reactive, conjunctiva clear, lids normal.  ENMT: Lips without lesions, good dentition, oropharynx clear.  Neck: Trachea midline, no masses, no thyromegaly. No cervical or supraclavicular lymphadenopathy  Respiratory: Unlabored respiratory effort, lungs clear to auscultation, no wheezes, no ronchi.  Cardiovascular: Normal S1, S2, no murmur, no edema.  Abdomen: Soft, non-tender, no masses, no hepatosplenomegaly.  Psych: Alert and oriented x3, normal affect and mood.        Assessment and Plan:   The following treatment plan was discussed    1. Gastroesophageal reflux disease with esophagitis  Continue prilosec  Avoid spicy and large meals.     2. Obesity (BMI 30-39.9)  - Patient identified as having weight management issue.  Appropriate orders and counseling given.    3. Breast cancer screening  - MA-SCREEN MAMMO W/CAD-BILAT; Future    4. Fatigue, unspecified type  - VITAMIN B12; " Future    5. Factor V Leiden mutation (HCC)  Continue with xarelto for now till advised by anticoagulation clinic or hematologist.       Followup: No Follow-up on file.

## 2018-06-15 ENCOUNTER — ANTICOAGULATION VISIT (OUTPATIENT)
Dept: MEDICAL GROUP | Facility: PHYSICIAN GROUP | Age: 68
End: 2018-06-15
Payer: MEDICARE

## 2018-06-15 DIAGNOSIS — D68.51 FACTOR V LEIDEN MUTATION (HCC): ICD-10-CM

## 2018-06-15 DIAGNOSIS — Z79.01 LONG TERM CURRENT USE OF ANTICOAGULANT THERAPY: ICD-10-CM

## 2018-06-15 PROCEDURE — 99999 PR NO CHARGE: CPT | Performed by: NURSE PRACTITIONER

## 2018-06-15 NOTE — PROGRESS NOTES
Target end date: Indefinite     Indication: DVT/PE with Factor V leiden     Drug: Xarelto    Health Status Since Last Assessment  Tolerating xarelto 10mg    Patient denies any new relevant medical problems, ED visits or hospitalizations  Patient denies any embolic events (stroke/tia/systemic embolism)    Adherence with DOAC Therapy   Pt has no missed any doses in the average week    Bleeding Risk Assessment     No Epistaxis   Pt denies any excessive or unusual bleeding/hematomas.  Pt denies any GI bleeds or hematemesis.  Pt denies any concerning daily headache or sub dural hematoma symptoms.     Pt denies any hematuria or abnormal vaginal bleeding.   ETOH overuse No     Creatinine Clearance/Renal Function     Latest ClCr > 60 ml/min     Drug Interactions   ASA/other antiplatelets no   NSAID No   Other drug interactions No   Verified no anticonvulsant or azole therapy, education provided for future use.     Examination   Symptomatic hypotension No   Significant gait impairment/imbalance/high risk for falls? No    Final Assessment and Recommendations:   Patient appears stable from the anticoagulation staindpoint.     Benefits of continued DOAC therapy outweigh risks for this patient   Recommend pt continue with current DOAC therapy     Other Actions: cmp/ cbc hemogram ordered prior to next visit    Follow up:   Will follow up with patient via telephone in 6 months.  I have added this patient to my list for follow up.

## 2018-08-20 ENCOUNTER — HOSPITAL ENCOUNTER (OUTPATIENT)
Dept: RADIOLOGY | Facility: MEDICAL CENTER | Age: 68
End: 2018-08-20
Attending: FAMILY MEDICINE
Payer: MEDICARE

## 2018-08-20 DIAGNOSIS — Z12.39 BREAST CANCER SCREENING: ICD-10-CM

## 2018-08-20 PROCEDURE — 77067 SCR MAMMO BI INCL CAD: CPT

## 2018-08-23 ENCOUNTER — OFFICE VISIT (OUTPATIENT)
Dept: VASCULAR LAB | Facility: MEDICAL CENTER | Age: 68
End: 2018-08-23
Attending: INTERNAL MEDICINE
Payer: MEDICARE

## 2018-08-23 VITALS
DIASTOLIC BLOOD PRESSURE: 63 MMHG | HEIGHT: 62 IN | WEIGHT: 172 LBS | BODY MASS INDEX: 31.65 KG/M2 | SYSTOLIC BLOOD PRESSURE: 129 MMHG | HEART RATE: 103 BPM

## 2018-08-23 DIAGNOSIS — Z79.01 LONG TERM (CURRENT) USE OF ANTICOAGULANTS: ICD-10-CM

## 2018-08-23 DIAGNOSIS — I80.02 SUPERFICIAL THROMBOPHLEBITIS OF LEFT LEG: ICD-10-CM

## 2018-08-23 DIAGNOSIS — I26.99 OTHER PULMONARY EMBOLISM WITHOUT ACUTE COR PULMONALE, UNSPECIFIED CHRONICITY (HCC): ICD-10-CM

## 2018-08-23 DIAGNOSIS — D68.51 FACTOR V LEIDEN MUTATION (HCC): ICD-10-CM

## 2018-08-23 PROCEDURE — 99212 OFFICE O/P EST SF 10 MIN: CPT | Performed by: INTERNAL MEDICINE

## 2018-08-23 PROCEDURE — 99213 OFFICE O/P EST LOW 20 MIN: CPT | Performed by: INTERNAL MEDICINE

## 2018-08-23 ASSESSMENT — ENCOUNTER SYMPTOMS
SENSORY CHANGE: 0
BRUISES/BLEEDS EASILY: 0
BLOOD IN STOOL: 0
DIZZINESS: 0
NERVOUS/ANXIOUS: 0
DEPRESSION: 0
MYALGIAS: 0
COUGH: 0
WEIGHT LOSS: 0
HEADACHES: 0
SHORTNESS OF BREATH: 0
FOCAL WEAKNESS: 0
LOSS OF CONSCIOUSNESS: 0
SPEECH CHANGE: 0

## 2018-08-23 NOTE — PROGRESS NOTES
VASCULAR ANTI-COAGULATION VISIT  Subjective:   Hermelinda Mosley is a 68 y.o. female who presents today 8/23/2018 for   Chief Complaint   Patient presents with   • New Patient     HPI:  Patient referred for eval and management of superficial phlebitis, chronic venous insufficiency, and long term anticoag  Had vein stripping back in 1970s  Did have some pains in leg after baby in 1970s - had superficial phlebitis  In about 2006-7 had a laser vein treatment   In september found to have a superficial thrombosis and small PE.  Both legs ache all day  Very mild swelling  No further shortness of breath  No smoking  No HRT or OCPs  Taking xarelto 10 mg daily  Factor V leiden  Daughter had 2 miscarriages so knew about factor V leiden  Another daughter had a blood clot after surgery.  No Bleeding on xarelto.  No cancer    PMH:  Superficial phlebitis, joyce,     PSH: choly    FH:    Dad - DM  Dauther - factor V leiden and miscarriage  Daughter - factor v leiden and dvt provoked by surgery  Mom - varicose veins    Social History   Substance Use Topics   • Smoking status: Former Smoker     Packs/day: 0.25     Years: 4.00     Types: Cigarettes     Quit date: 1/1/1969   • Smokeless tobacco: Never Used      Comment: only smokes 1-2 cigs a couple times a week for 1-2 years   • Alcohol use 0.0 - 1.2 oz/week      Comment: occasional     DIET AND EXERCISE:  Weight Change: limited  Diet: common adult  Exercise: minimal exercise     Review of Systems   Constitutional: Negative for malaise/fatigue and weight loss.   Respiratory: Negative for cough and shortness of breath.    Cardiovascular: Positive for leg swelling. Negative for chest pain.   Gastrointestinal: Negative for blood in stool and melena.   Genitourinary: Negative for frequency and hematuria.   Musculoskeletal: Negative for joint pain and myalgias.   Neurological: Negative for dizziness, sensory change, speech change, focal weakness, loss of consciousness and headaches.  "  Endo/Heme/Allergies: Does not bruise/bleed easily.   Psychiatric/Behavioral: Negative for depression. The patient is not nervous/anxious.       Objective:     Vitals:    08/23/18 1506   BP: 129/63   Pulse: (!) 103   Weight: 78 kg (172 lb)   Height: 1.575 m (5' 2\")     Body mass index is 31.46 kg/m².  Physical Exam   Constitutional: She is oriented to person, place, and time. She appears well-developed and well-nourished. No distress.   HENT:   Head: Normocephalic and atraumatic.   Eyes: Pupils are equal, round, and reactive to light. Conjunctivae and EOM are normal. No scleral icterus.   Neck: Normal range of motion. Neck supple. No JVD present. No thyromegaly present.   Cardiovascular: Normal rate, regular rhythm, normal heart sounds and intact distal pulses.  Exam reveals no gallop and no friction rub.    No murmur heard.  Pulmonary/Chest: Effort normal and breath sounds normal. No respiratory distress. She has no wheezes. She has no rales. She exhibits no tenderness.   Abdominal: Soft. Bowel sounds are normal. She exhibits no distension and no mass. There is no tenderness. There is no rebound and no guarding.   Musculoskeletal: Normal range of motion. She exhibits no edema or tenderness.   Mult small varicosities and reticulars   Neurological: She is alert and oriented to person, place, and time. She has normal reflexes. No cranial nerve deficit. Coordination normal.   Skin: Skin is warm and dry. No rash noted. She is not diaphoretic. No erythema. No pallor.   Psychiatric: She has a normal mood and affect.   Vitals reviewed.    Lab Results   Component Value Date    CHOLSTRLTOT 195 06/30/2017     (H) 06/30/2017    HDL 55 06/30/2017    TRIGLYCERIDE 150 (H) 06/30/2017      Lab Results   Component Value Date    PROTHROMBTM 22.8 (H) 09/15/2017    INR 1.1 12/01/2017         Lab Results   Component Value Date    SODIUM 141 04/20/2018    POTASSIUM 4.6 04/20/2018    CHLORIDE 106 04/20/2018    CO2 28 04/20/2018    " GLUCOSE 84 04/20/2018    BUN 20 04/20/2018    CREATININE 1.06 04/20/2018        Lab Results   Component Value Date    WBC 4.9 10/10/2017    RBC 5.31 10/10/2017    HEMOGLOBIN 15.0 10/10/2017    HEMATOCRIT 45.3 10/10/2017    MCV 85.3 10/10/2017    MCH 28.2 10/10/2017    MCHC 33.1 (L) 10/10/2017    MPV 10.6 10/10/2017      Multiple imaging studies available in EMR and reviewed with patient at todays visit     Medical Decision Making:  Today's Assessment / Status / Plan:     1. Factor V Leiden mutation (HCC)     2. Long term (current) use of anticoagulants     3. Superficial thrombophlebitis of left leg     4. Other pulmonary embolism without acute cor pulmonale, unspecified chronicity (HCC)       Indication for anticoagulation: Superficial venous thrombosis and small pulmonary embolism in September 2017 with family history of DVT and positive factor V Leiden    Anti-Platelet/Anti-Coagulant Tx:   Shannon discussion about the risk and benefits of extended anticoagulation. As I told her it is a difficult clinical decision in her case. She did have an unprovoked pulmonary embolism, but it appears to have been very limited in extent. She has never had a DVT, but has had at least 2 episodes of superficial phlebitis. She has known heterozygous for factor V Leiden and a family history of DVT. Overall, she is at low risk of bleeding, but she does understand that there is a bleeding risk associated with anticoagulation. As I told her, based on the balance of risk and benefits I agree with her hematologist that the best course of action is to continue extended, decreased dose anticoagulation as long as well-tolerated. Patient in agreement    Anti-Coagulation Plan:  - Continue xarelto 10  mg daily  - Follow-up in anti-coag clinic  - Report any bleeding immediately  - Avoid antiplatelet agents and NSAIDs  - See care in the setting of head or other major trauma  - Avoid HRT  - Call anti-coag clinic if any procedures planned for  instructions on holding and restarting anticoagulation  - Will defer any indicated age-appropriate screening for occult malignancy to PCP    Chronic venous insufficiency  She has had a family history varicosities and she has had a couple of interventions already. Her symptoms are relatively mild  - Trial of stockings  - Call if symptoms worsen    Smoking:    Described risk of recurrent DVT PE with smoking  - Continue complete avoidance     Physical Activity:   - As much walking as possible  - Legs up while sitting    Weight Management and Nutrition: Agree with slow steady weight loss    Instructed to follow-up with PCP for remainder of adult medical needs: yes  We will partner with other provider in the management of established vascular disease and cardiometabolic risk factors    Studies to Be Obtained: None  Labs to Be Obtained:  Per PCP and coag clinic    Follow up in: Anticoagulation clinic as scheduled. Can see us in vascular care on an as-needed basis    Michael J Bloch, M.D.    CC:   Maribel Long M.D.

## 2018-08-29 ENCOUNTER — OFFICE VISIT (OUTPATIENT)
Dept: URGENT CARE | Facility: PHYSICIAN GROUP | Age: 68
End: 2018-08-29
Payer: MEDICARE

## 2018-08-29 VITALS
RESPIRATION RATE: 16 BRPM | HEART RATE: 76 BPM | WEIGHT: 171 LBS | HEIGHT: 63 IN | BODY MASS INDEX: 30.3 KG/M2 | TEMPERATURE: 98.4 F | DIASTOLIC BLOOD PRESSURE: 70 MMHG | SYSTOLIC BLOOD PRESSURE: 118 MMHG | OXYGEN SATURATION: 98 %

## 2018-08-29 DIAGNOSIS — S76.311A RIGHT HAMSTRING MUSCLE STRAIN, INITIAL ENCOUNTER: ICD-10-CM

## 2018-08-29 PROCEDURE — 99214 OFFICE O/P EST MOD 30 MIN: CPT | Performed by: PHYSICIAN ASSISTANT

## 2018-08-29 ASSESSMENT — PAIN SCALES - GENERAL: PAINLEVEL: 7=MODERATE-SEVERE PAIN

## 2018-08-29 NOTE — PROGRESS NOTES
Chief Complaint   Patient presents with   • Leg Pain     right leg above her knee on the back of her leg       HISTORY OF PRESENT ILLNESS: Patient is a 68 y.o. female who presents today for the following:    Right leg pain x around 1 month  Worse x 2-3 weeks  Distal posterior right upper leg;  Feels like a pulling sensation/aches  Was having low back pain before this started  Did do an obstacle course at her daughter's house just before pain onset  OTC meds tried: massager, ice, APAP - helps but its just still there     Patient Active Problem List    Diagnosis Date Noted   • Obesity (BMI 30-39.9) 12/19/2017   • Superficial thrombophlebitis of left leg 10/20/2017   • Vision changes 09/28/2017   • Long term (current) use of anticoagulants [Z79.01] 09/22/2017   • Chest pressure 06/27/2017   • Epigastric pressure 03/03/2017   • Nuñez's esophagus without dysplasia 10/17/2016   • Varicose veins of legs 10/17/2016   • Primary osteoarthritis involving multiple joints 04/05/2016   • Chronic kidney disease, stage 3 04/05/2016   • Factor V Leiden mutation (HCC) 04/05/2016   • Left ankle instability 01/05/2016   • Vitamin D deficiency disease 01/05/2016   • Cervical disc disease 01/05/2016   • Osteopenia 09/10/2013   • Other hemorrhoids 07/31/2009   • Esophageal reflux 07/31/2009   • Degeneration of lumbar or lumbosacral intervertebral disc 07/31/2009   • Irritable bowel syndrome 07/31/2009       Allergies:Patient has no known allergies.    Current Outpatient Prescriptions Ordered in [facility]   Medication Sig Dispense Refill   • calcium acetate (PHOS-LO) 667 MG Cap Take 1,334 mg by mouth 3 times a day, with meals.     • rivaroxaban (XARELTO) 10 MG Tab tablet Take 1 Tab by mouth with dinner. 90 Tab 3   • OMEGA-3 KRILL OIL PO Take 1 tablet by mouth every day.     • Lifitegrast (XIIDRA) 5 % Solution 1 Drop by Ophthalmic route 2 Times a Day.     • Multiple Vitamins-Minerals (MULTIVITAMIN PO) Take  by mouth.     • Cholecalciferol  (VITAMIN D PO) Take  by mouth.     • Probiotic Product (PROBIOTIC DAILY PO) Take  by mouth.     • Ascorbic Acid (VITAMIN C PO) Take 500 mg by mouth every day.     • omeprazole (PRILOSEC) 20 MG delayed-release capsule Take 20 mg by mouth every day.       No current [facility]-ordered facility-administered medications on file.        Past Medical History:   Diagnosis Date   • Abdominal pain, unspecified site    • Alpha 1-antitrypsin PiMS phenotype    • Back pain    • Bronchitis    • Chickenpox    • Degeneration of lumbar or lumbosacral intervertebral disc    • Diarrhea    • DVT (deep venous thrombosis) ([facility])    • Esophageal reflux    • [place] measles    • Hernia of unspecified site of abdominal cavity without mention of obstruction or gangrene repaired   • History of colonoscopy =normal    Dr. Benjamin> [facility]   • Influenza    • Irritable bowel syndrome    • Mumps    • Pap smear due   • Pneumonia    • Recurrent UTI 2009   • Restless leg syndrome    • Screening mammogram 10/30/2008=normal   • Superficial thrombophlebitis of left leg 10/20/2017   • Swelling 2009   • Tonsillitis    • Unspecified hemorrhoids without mention of complication    • Vein, varicose stripped       Social History   Substance Use Topics   • Smoking status: Former Smoker     Packs/day: 0.25     Years: 4.00     Types: Cigarettes     Quit date: 1969   • Smokeless tobacco: Never Used      Comment: only smokes 1-2 cigs a couple times a week for 1-2 years   • Alcohol use 0.0 - 1.2 oz/week      Comment: occasional       Family Status   Relation Status   • Mo         97   • Fa  at age 47        kidney failure   • Bro Alive        polycythemia   • Sis Alive   • Son Alive   • Rachel Alive   • Rachel Alive     Family History   Problem Relation Age of Onset   • Hypertension Mother    • Diabetes Mother         pre diabetes   • Diabetes Father    • Hypertension Father    • Kidney Disease Father    • Respiratory Disease Brother   "      Review of Systems   Constitutional ROS: No unexpected change in weight, No weakness, No fatigue  Eye ROS: No recent significant change in vision, No eye pain, redness, discharge  Ear ROS: No drainage, No tinnitus or vertigo, No recent change in hearing  Mouth/Throat ROS: No teeth or gum problems, No bleeding gums, No tongue complaints  Neck ROS: No swollen glands, No significant pain in neck  Pulmonary ROS: No chronic cough, sputum, or hemoptysis, No dyspnea on exertion, No wheezing  Cardiovascular ROS: No diaphoresis, No edema, No palpitations  Gastrointestinal ROS: No change in bowel habits, No significant change in appetite, No nausea, vomiting, diarrhea, or constipation  Hematologic/Lymphatic ROS: No chills, No night sweats, No weight loss  Skin/Integumentary ROS: No edema, No evidence of rash, No itching      Exam:  Blood pressure 118/70, pulse 76, temperature 36.9 °C (98.4 °F), resp. rate 16, height 1.6 m (5' 3\"), weight 77.6 kg (171 lb), SpO2 98 %.  General: Well developed, well nourished. No distress.  Pulmonary: Unlabored respiratory effort.   Extremities: Distal posterior right lower leg is nontender to palpation and shows no ecchymosis, erythema, or skin changes.  No obvious edema is noted.  Skin: Warm, dry, good turgor. No rashes in visible areas.   Psych: Normal mood. Alert and oriented x3. Judgment and insight is normal.    Assessment/Plan:  Etiology appears to be muscular with full extension of the right lower leg along with hip flexion.  Referring patient to physical therapy.  Discussed appropriate over-the-counter symptomatic medications.  Follow-up for worsening or persistent symptoms.  1. Right hamstring muscle strain, initial encounter  REFERRAL TO PHYSICAL THERAPY Reason for Therapy: Eval/Treat/Report       "

## 2018-09-05 ENCOUNTER — OFFICE VISIT (OUTPATIENT)
Dept: MEDICAL GROUP | Facility: PHYSICIAN GROUP | Age: 68
End: 2018-09-05
Payer: MEDICARE

## 2018-09-05 VITALS
RESPIRATION RATE: 16 BRPM | HEIGHT: 62 IN | OXYGEN SATURATION: 97 % | SYSTOLIC BLOOD PRESSURE: 116 MMHG | WEIGHT: 169 LBS | DIASTOLIC BLOOD PRESSURE: 68 MMHG | BODY MASS INDEX: 31.1 KG/M2 | TEMPERATURE: 98.2 F | HEART RATE: 74 BPM

## 2018-09-05 DIAGNOSIS — I70.90 ATHEROSCLEROSIS: ICD-10-CM

## 2018-09-05 DIAGNOSIS — M79.651 RIGHT THIGH PAIN: ICD-10-CM

## 2018-09-05 DIAGNOSIS — Z78.0 MENOPAUSE: ICD-10-CM

## 2018-09-05 DIAGNOSIS — N18.30 CHRONIC KIDNEY DISEASE, STAGE 3 (HCC): ICD-10-CM

## 2018-09-05 DIAGNOSIS — Z86.718 HISTORY OF BLOOD CLOTS: ICD-10-CM

## 2018-09-05 DIAGNOSIS — M79.606 PAIN OF LOWER EXTREMITY, UNSPECIFIED LATERALITY: ICD-10-CM

## 2018-09-05 DIAGNOSIS — E78.5 DYSLIPIDEMIA: ICD-10-CM

## 2018-09-05 DIAGNOSIS — Z23 NEED FOR VACCINATION: ICD-10-CM

## 2018-09-05 DIAGNOSIS — D68.51 FACTOR V LEIDEN MUTATION (HCC): ICD-10-CM

## 2018-09-05 DIAGNOSIS — Z79.01 LONG TERM (CURRENT) USE OF ANTICOAGULANTS: ICD-10-CM

## 2018-09-05 PROCEDURE — 90662 IIV NO PRSV INCREASED AG IM: CPT | Performed by: FAMILY MEDICINE

## 2018-09-05 PROCEDURE — G0008 ADMIN INFLUENZA VIRUS VAC: HCPCS | Performed by: FAMILY MEDICINE

## 2018-09-05 PROCEDURE — 99214 OFFICE O/P EST MOD 30 MIN: CPT | Mod: 25 | Performed by: FAMILY MEDICINE

## 2018-09-05 RX ORDER — CYCLOBENZAPRINE HCL 5 MG
5-10 TABLET ORAL 3 TIMES DAILY PRN
Qty: 30 TAB | Refills: 1 | Status: SHIPPED
Start: 2018-09-05 | End: 2020-02-25

## 2018-09-05 ASSESSMENT — PATIENT HEALTH QUESTIONNAIRE - PHQ9: CLINICAL INTERPRETATION OF PHQ2 SCORE: 0

## 2018-09-05 NOTE — ASSESSMENT & PLAN NOTE
Recent GFR at 52, creatinine normal at 1.06  Encouraged to continue with hydration 8 glasses of water daily, low salt diet, avoid NSAIdS  Repeat lab to monitor.

## 2018-09-05 NOTE — PROGRESS NOTES
Subjective:   Hermelinda Mosley is a 68 y.o. female here today for evaluation and management of:     Factor V Leiden mutation (CMS-HCC)  Had a consultation with her cardiologist at the anticoagulation clinic and with her hematologist and recommendation is to continue on long term low dose anticoagulation as long as her bleeding risk is low.   She is a former smoker, encouraged to avoid smoking completely to reduce risk of recurrent DVT/PE. Avoid HRT.   Continues on xarelto 10 mg daily.      Long term (current) use of anticoagulants [Z79.01]  Had a consultation with her cardiologist at the anticoagulation clinic and with her hematologist and recommendation is to continue on long term low dose anticoagulation as long as her bleeding risk is low.   She is a former smoker, encouraged to avoid smoking completely to reduce risk of recurrent DVT/PE. Avoid HRT.   Has a family hx of blood clots and factor V leiden mutation. Patient also had a small PE and superficial clots.   Continues on xarelto 10 mg daily.      Chronic kidney disease, stage 3  Recent GFR at 52, creatinine normal at 1.06  Encouraged to continue with hydration 8 glasses of water daily, low salt diet, avoid NSAIdS  Repeat lab to monitor.     Right thigh pain  In July her back pain flared up, went away, 2 weeks later she [place] with [place] and after that felt pain in the back of her right thigh. The pain has persisted and now also sometimes radiates down to her leg and across the top of her foot. Pain is worse with walking and standing for longer periods of time. Pain is less when she is sitting.   extrastrength tylenol did help temporarily.   She was referred to PT and has not been able to get an appointment yet till oct.   She has a history of unprovoked PE and superficial throbophebitis, is on low dose xarelto 10 mg indefinitely as she has factor V leiden mutation also.   Likely a muscle strain but since symptoms  persistent since July to now (september and not improving) actually worse over last 2 weeks, and brother had history of blocked arteries in abdomen/femur and had an aortic bifemoral bypass, so will check US doppler of her aorta and femoral vasculature.          Current medicines (including changes today)  Current Outpatient Prescriptions   Medication Sig Dispense Refill   • cyclobenzaprine (FLEXERIL) 5 MG tablet Take 1-2 Tabs by mouth 3 times a day as needed. 30 Tab 1   • Zoster Vac Recomb Adjuvanted (SHINGRIX) 50 MCG Recon Susp 0.5 mL by Intramuscular route Once for 1 dose. 0.5 mL 1   • calcium acetate (PHOS-LO) 667 MG Cap Take 1,334 mg by mouth 3 times a day, with meals.     • rivaroxaban (XARELTO) 10 MG Tab tablet Take 1 Tab by mouth with dinner. 90 Tab 3   • OMEGA-3 KRILL OIL PO Take 1 tablet by mouth every day.     • Lifitegrast (XIIDRA) 5 % Solution 1 Drop by Ophthalmic route 2 Times a Day.     • Multiple Vitamins-Minerals (MULTIVITAMIN PO) Take  by mouth.     • Cholecalciferol (VITAMIN D PO) Take  by mouth.     • Probiotic Product (PROBIOTIC DAILY PO) Take  by mouth.     • Ascorbic Acid (VITAMIN C PO) Take 500 mg by mouth every day.     • omeprazole (PRILOSEC) 20 MG delayed-release capsule Take 20 mg by mouth every day.       No current facility-administered medications for this visit.      She  has a past medical history of Abdominal pain, unspecified site; Alpha 1-antitrypsin PiMS phenotype; Back pain; Bronchitis; Chickenpox; Degeneration of lumbar or lumbosacral intervertebral disc; Diarrhea; DVT (deep venous thrombosis) (HCC); Esophageal reflux; [place] measles; Hernia of unspecified site of abdominal cavity without mention of obstruction or gangrene (repaired); History of colonoscopy (2005=normal); Influenza; Irritable bowel syndrome; Mumps; Pap smear (due); Pneumonia; Recurrent UTI (9/4/2009); Restless leg syndrome; Screening mammogram (10/30/2008=normal); Superficial thrombophlebitis of left leg  "(10/20/2017); Swelling (9/4/2009); Tonsillitis; Unspecified hemorrhoids without mention of complication; and Vein, varicose (stripped). She also has no past medical history of Encounter for long-term (current) use of other medications.    ROS  No chest pain, no shortness of breath, no abdominal pain       Objective:     Blood pressure 116/68, pulse 74, temperature 36.8 °C (98.2 °F), resp. rate 16, height 1.575 m (5' 2\"), weight 76.7 kg (169 lb), SpO2 97 %. Body mass index is 30.91 kg/m².   Physical Exam:  Constitutional: Alert, no distress.  Skin: Warm, dry, good turgor, no rashes in visible areas. Skin on back of right thigh with no redness or swelling or cords palpable. Has some enlarged varicose veins.   Eye: Equal, round and reactive, conjunctiva clear, lids normal.  ENMT: Lips without lesions, good dentition, oropharynx clear.  Neck: Trachea midline, no masses, no thyromegaly. No cervical or supraclavicular lymphadenopathy  Respiratory: Unlabored respiratory effort, lungs clear to auscultation, no wheezes, no ronchi.  Cardiovascular: Normal S1, S2, no murmur, no edema.  Abdomen: Soft, non-tender, no masses, no hepatosplenomegaly.  Psych: Alert and oriented x3, normal affect and mood.        Assessment and Plan:   The following treatment plan was discussed    1. Factor V Leiden mutation (HCC)  Continue on xarelto 10 mg indefinitely  - US-ABD VASCULAR DOPPLER COMP; Future  - US-EXTREMITY LOWER ARTERY BILATERAL; Future    2. Long term (current) use of anticoagulants [Z79.01]  Continue on xarelto 10 mg indefinitely    3. Chronic kidney disease, stage 3  Encouraged low salt diet, hydration, avoid NSAIDS  Monitor labs.     4. Right thigh pain  Check US to make sure no DVT or blockage of arteries present.   - US-ABD VASCULAR DOPPLER COMP; Future  - US-EXTREMITY LOWER ARTERY BILATERAL; Future    5. Need for vaccination  - INFLUENZA VACCINE, HIGH DOSE (65+ ONLY)  - Zoster Vac Recomb Adjuvanted (SHINGRIX) 50 MCG Recon " Susp; 0.5 mL by Intramuscular route Once for 1 dose.  Dispense: 0.5 mL; Refill: 1    6. History of blood clots  Check US to make sure no DVT or blockage of arteries present.   - US-EXTREMITY LOWER ARTERY BILATERAL; Future    7. Pain of lower extremity, unspecified laterality  Check US to make sure no DVT or blockage of arteries present.   - US-EXTREMITY LOWER ARTERY BILATERAL; Future    8. Atherosclerosis  Check US to make sure no DVT or blockage of arteries present.   - US-EXTREMITY LOWER ARTERY BILATERAL; Future      Followup: Return in about 3 months (around 12/5/2018).

## 2018-09-05 NOTE — ASSESSMENT & PLAN NOTE
Had a consultation with her cardiologist at the anticoagulation clinic and with her hematologist and recommendation is to continue on long term low dose anticoagulation as long as her bleeding risk is low.   She is a former smoker, encouraged to avoid smoking completely to reduce risk of recurrent DVT/PE. Avoid HRT.   Has a family hx of blood clots and factor V leiden mutation. Patient also had a small PE and superficial clots.   Continues on xarelto 10 mg daily.

## 2018-09-05 NOTE — ASSESSMENT & PLAN NOTE
Had a consultation with her cardiologist at the anticoagulation clinic and with her hematologist and recommendation is to continue on long term low dose anticoagulation as long as her bleeding risk is low.   She is a former smoker, encouraged to avoid smoking completely to reduce risk of recurrent DVT/PE. Avoid HRT.   Continues on xarelto 10 mg daily.

## 2018-09-05 NOTE — ASSESSMENT & PLAN NOTE
In July her back pain flared up, went away, 2 weeks later she went [place] with her granddaughter and after that felt pain in the back of her right thigh. The pain has persisted and now also sometimes radiates down to her leg and across the top of her foot. Pain is worse with walking and standing for longer periods of time. Pain is less when she is sitting.   extrastrength tylenol did help temporarily.   She was referred to PT and has not been able to get an appointment yet till oct.   She has a history of unprovoked PE and superficial throbophebitis, is on low dose xarelto 10 mg indefinitely as she has factor V leiden mutation also.   Likely a muscle strain but since symptoms persistent since July to now (september and not improving) actually worse over last 2 weeks, and brother had history of blocked arteries in abdomen/femur and had an aortic bifemoral bypass, so will check US doppler of her aorta and femoral vasculature.

## 2018-09-13 ENCOUNTER — HOSPITAL ENCOUNTER (OUTPATIENT)
Dept: RADIOLOGY | Facility: MEDICAL CENTER | Age: 68
End: 2018-09-13
Attending: FAMILY MEDICINE
Payer: MEDICARE

## 2018-09-13 DIAGNOSIS — M79.606 PAIN OF LOWER EXTREMITY, UNSPECIFIED LATERALITY: ICD-10-CM

## 2018-09-13 DIAGNOSIS — D68.51 FACTOR V LEIDEN MUTATION (HCC): ICD-10-CM

## 2018-09-13 DIAGNOSIS — M79.651 RIGHT THIGH PAIN: ICD-10-CM

## 2018-09-13 DIAGNOSIS — Z86.718 HISTORY OF BLOOD CLOTS: ICD-10-CM

## 2018-09-13 PROCEDURE — 93922 UPR/L XTREMITY ART 2 LEVELS: CPT

## 2018-09-13 PROCEDURE — 93970 EXTREMITY STUDY: CPT

## 2018-09-19 ENCOUNTER — HOSPITAL ENCOUNTER (OUTPATIENT)
Dept: RADIOLOGY | Facility: MEDICAL CENTER | Age: 68
End: 2018-09-19
Attending: FAMILY MEDICINE
Payer: MEDICARE

## 2018-09-19 DIAGNOSIS — Z78.0 MENOPAUSE: ICD-10-CM

## 2018-09-19 PROCEDURE — 77080 DXA BONE DENSITY AXIAL: CPT

## 2018-09-19 NOTE — PROGRESS NOTES
Hermelinda,  Your dexa scan shows decrease in bone density (osteopenia) but not yet to severe loss of bone density (osteoporosis). Make sure to take daily calcium 500mg and vit D 2000 -4000 units. Also do some weight bearing exercises like walking or weights to help increase your bone density. We can also talk about medication to help with bone density at your next visit.   Maribel Long M.D.

## 2018-11-30 ENCOUNTER — HOSPITAL ENCOUNTER (OUTPATIENT)
Dept: LAB | Facility: MEDICAL CENTER | Age: 68
End: 2018-11-30
Attending: FAMILY MEDICINE
Payer: MEDICARE

## 2018-11-30 DIAGNOSIS — E78.5 DYSLIPIDEMIA: ICD-10-CM

## 2018-11-30 DIAGNOSIS — N18.30 CHRONIC KIDNEY DISEASE, STAGE 3 (HCC): ICD-10-CM

## 2018-11-30 LAB
ALBUMIN SERPL BCP-MCNC: 4.4 G/DL (ref 3.2–4.9)
BUN SERPL-MCNC: 15 MG/DL (ref 8–22)
CALCIUM SERPL-MCNC: 9.7 MG/DL (ref 8.5–10.5)
CHLORIDE SERPL-SCNC: 105 MMOL/L (ref 96–112)
CHOLEST SERPL-MCNC: 171 MG/DL (ref 100–199)
CO2 SERPL-SCNC: 28 MMOL/L (ref 20–33)
CREAT SERPL-MCNC: 0.99 MG/DL (ref 0.5–1.4)
FASTING STATUS PATIENT QL REPORTED: NORMAL
GLUCOSE SERPL-MCNC: 77 MG/DL (ref 65–99)
HDLC SERPL-MCNC: 54 MG/DL
LDLC SERPL CALC-MCNC: 93 MG/DL
PHOSPHATE SERPL-MCNC: 3.1 MG/DL (ref 2.5–4.5)
POTASSIUM SERPL-SCNC: 4 MMOL/L (ref 3.6–5.5)
SODIUM SERPL-SCNC: 138 MMOL/L (ref 135–145)
TRIGL SERPL-MCNC: 120 MG/DL (ref 0–149)

## 2018-11-30 PROCEDURE — 80061 LIPID PANEL: CPT

## 2018-11-30 PROCEDURE — 36415 COLL VENOUS BLD VENIPUNCTURE: CPT

## 2018-11-30 PROCEDURE — 80069 RENAL FUNCTION PANEL: CPT

## 2018-12-03 ENCOUNTER — OFFICE VISIT (OUTPATIENT)
Dept: PULMONOLOGY | Facility: HOSPICE | Age: 68
End: 2018-12-03
Payer: MEDICARE

## 2018-12-03 ENCOUNTER — NON-PROVIDER VISIT (OUTPATIENT)
Dept: PULMONOLOGY | Facility: HOSPICE | Age: 68
End: 2018-12-03
Payer: MEDICARE

## 2018-12-03 VITALS
HEIGHT: 62 IN | SYSTOLIC BLOOD PRESSURE: 126 MMHG | RESPIRATION RATE: 16 BRPM | OXYGEN SATURATION: 96 % | HEART RATE: 73 BPM | BODY MASS INDEX: 31.28 KG/M2 | TEMPERATURE: 97.3 F | DIASTOLIC BLOOD PRESSURE: 74 MMHG | WEIGHT: 170 LBS

## 2018-12-03 VITALS — BODY MASS INDEX: 31.28 KG/M2 | HEIGHT: 62 IN | WEIGHT: 170 LBS

## 2018-12-03 DIAGNOSIS — Z86.711 HISTORY OF PULMONARY EMBOLUS (PE): ICD-10-CM

## 2018-12-03 DIAGNOSIS — Z14.8 ALPHA-1-ANTITRYPSIN DEFICIENCY CARRIER: ICD-10-CM

## 2018-12-03 DIAGNOSIS — D68.51 FACTOR V LEIDEN MUTATION (HCC): ICD-10-CM

## 2018-12-03 PROCEDURE — 94729 DIFFUSING CAPACITY: CPT | Performed by: INTERNAL MEDICINE

## 2018-12-03 PROCEDURE — 94726 PLETHYSMOGRAPHY LUNG VOLUMES: CPT | Performed by: INTERNAL MEDICINE

## 2018-12-03 PROCEDURE — 94060 EVALUATION OF WHEEZING: CPT | Performed by: INTERNAL MEDICINE

## 2018-12-03 PROCEDURE — 99214 OFFICE O/P EST MOD 30 MIN: CPT | Mod: 25 | Performed by: INTERNAL MEDICINE

## 2018-12-03 ASSESSMENT — PULMONARY FUNCTION TESTS
FEV1/FVC_PERCENT_PREDICTED: 110
FEV1/FVC: 83.62
FEV1_PERCENT_PREDICTED: 137
FEV1/FVC: 82
FEV1/FVC_PERCENT_LLN: 63
FVC: 3.61
FEV1_PERCENT_PREDICTED: 135
FEV1/FVC_PERCENT_CHANGE: 2
FEV1_PERCENT_CHANGE: -1
FEV1/FVC: 84
FEV1/FVC_PERCENT_CHANGE: 33
FVC: 3.48
FEV1_LLN: 1.80
FVC_LLN: 2.37
FEV1/FVC_PREDICTED: 76
FEV1_PREDICTED: 2.15
FEV1: 2.96
FVC_PREDICTED: 2.84
FEV1_PERCENT_CHANGE: -3
FVC_PERCENT_PREDICTED: 127
FVC_PERCENT_PREDICTED: 122
FEV1/FVC_PERCENT_PREDICTED: 107
FEV1/FVC_PERCENT_PREDICTED: 76
FEV1/FVC_PERCENT_PREDICTED: 111
FEV1/FVC_PERCENT_PREDICTED: 108
FEV1: 2.91
FEV1/FVC: 82

## 2018-12-03 NOTE — PROGRESS NOTES
Chief Complaint   Patient presents with   • Results     CPFT results       HPI: This patient is a 68 y.o. Female returns for annual pulmonary function testing.  She is an alpha-1 antitrypsin deficiency carrier, with MZ phenotype.  She is a non-smoker and denies any respiratory symptomatology, specifically dyspnea, cough, wheezing. Her pulmonary function testing has been normal.  Today's PFT show supratherapeutic lung function with FEV1 2.91 L 135%.  She has a history of factor V Leiden deficiency with DVT and pulmonary emboli in 2017.  She is chronically on Xarelto.  She denies chest pain or lower extremity edema.  Chest CT April 2018 showed resolution of pulmonary emboli.  She feels well and has no specific complaints.      Past Medical History:   Diagnosis Date   • Abdominal pain, unspecified site    • Alpha 1-antitrypsin PiMS phenotype    • Back pain    • Bronchitis    • Chickenpox    • Degeneration of lumbar or lumbosacral intervertebral disc    • Diarrhea    • DVT (deep venous thrombosis) (HCC)    • Esophageal reflux    • English measles    • Hernia of unspecified site of abdominal cavity without mention of obstruction or gangrene repaired   • History of colonoscopy 2005=normal    Dr. [facility]   • Influenza    • Irritable bowel syndrome    • Mumps    • Pap smear due   • Pneumonia    • Recurrent UTI 9/4/2009   • Restless leg syndrome    • Screening mammogram 10/30/2008=normal   • Superficial thrombophlebitis of left leg 10/20/2017   • Swelling 9/4/2009   • Tonsillitis    • Unspecified hemorrhoids without mention of complication    • Vein, varicose stripped       Social History     Social History   • Marital status:      Spouse name: N/A   • Number of children: N/A   • Years of education: N/A     Occupational History   • Not on file.     Social History Main Topics   • Smoking status: Former Smoker     Packs/day: 0.25     Years: 4.00     Types: Cigarettes     Quit date: 1/1/1969   •  Smokeless tobacco: Never Used      Comment: only smokes 1-2 cigs a couple times a week for 1-2 years   • Alcohol use 0.0 - 1.2 oz/week      Comment: occasional   • Drug use: No   • Sexual activity: Yes     Partners: Male      Comment:      Other Topics Concern   • Not on file     Social History Narrative   • No narrative on file       Family History   Problem Relation Age of Onset   • Hypertension Mother    • Diabetes Mother         pre diabetes   • Diabetes Father    • Hypertension Father    • Kidney Disease Father    • Respiratory Disease Brother        Current Outpatient Prescriptions on File Prior to Visit   Medication Sig Dispense Refill   • calcium acetate (PHOS-LO) 667 MG Cap Take 1,334 mg by mouth 3 times a day, with meals.     • rivaroxaban (XARELTO) 10 MG Tab tablet Take 1 Tab by mouth with dinner. 90 Tab 3   • Lifitegrast (XIIDRA) 5 % Solution 1 Drop by Ophthalmic route 2 Times a Day.     • Multiple Vitamins-Minerals (MULTIVITAMIN PO) Take  by mouth.     • Cholecalciferol (VITAMIN D PO) Take  by mouth.     • Probiotic Product (PROBIOTIC DAILY PO) Take  by mouth.     • Ascorbic Acid (VITAMIN C PO) Take 500 mg by mouth every day.     • omeprazole (PRILOSEC) 20 MG delayed-release capsule Take 20 mg by mouth every day.     • cyclobenzaprine (FLEXERIL) 5 MG tablet Take 1-2 Tabs by mouth 3 times a day as needed. (Patient not taking: Reported on 12/3/2018) 30 Tab 1   • OMEGA-3 KRILL OIL PO Take 1 tablet by mouth every day.       No current facility-administered medications on file prior to visit.        Allergies: Patient has no known allergies.    ROS:   Constitutional: Denies fevers, chills, night sweats, fatigue or weight loss  Eyes: Denies vision loss, pain, drainage, double vision  Ears, Nose, Throat: Denies earache, difficulty hearing, tinnitus, nasal congestion, hoarseness  Cardiovascular: Denies chest pain, tightness, palpitations, orthopnea or edema  Respiratory: Denies shortness of breath,  "cough, wheezing, hemoptysis  Sleep: Denies daytime sleepiness, snoring, apneas, insomnia, morning headaches  GI: Denies heartburn, dysphagia, nausea, abdominal pain, diarrhea or constipation  : Denies frequent urination, hematuria, discharge or painful urination  Musculoskeletal: Denies back pain, painful joints, sore muscles  Neurological: Denies weakness or headaches  Skin: No rashes    Blood pressure 126/74, pulse 73, temperature 36.3 °C (97.3 °F), temperature source Temporal, resp. rate 16, height 1.581 m (5' 2.25\"), weight 77.1 kg (170 lb), last menstrual period 01/01/2000, SpO2 96 %, not currently breastfeeding.    Physical Exam:  Appearance: Well-nourished, well-developed, in no acute distress  HEENT: Normocephalic, atraumatic, white sclera, PERRLA, oropharynx clear  Neck: No adenopathy or masses  Respiratory: no intercostal retractions or accessory muscle use  Lungs auscultation: Clear to auscultation bilaterally  Cardiovascular: Regular rate rhythm. No murmurs, rubs or gallops.  No LE edema  Abdomen: soft, nondistended  Gait: Normal  Digits: No clubbing, cyanosis  Motor: No focal deficits  Orientation: Oriented to time, person and place    Diagnosis:  1. Alpha-1-antitrypsin deficiency carrier (HCC)  PULMONARY FUNCTION TESTS -Test requested: Complete Pulmonary Function Test   2. History of pulmonary embolus (PE)     3. Factor V Leiden mutation (HCC)         Plan:  Hermelinda is a non-smoker, with excellent lung function.  She has alpha 1 antitrypsin MZ phenotype, and recommend annual screening pulmonary function testing.  Avoidance of smoke exposure encouraged.  Continue Xarelto for factor V Leiden deficiency.  Return in about 1 year (around 12/3/2019) for with PFT.      "

## 2018-12-03 NOTE — PROCEDURES
Technician: JESSICA Forbes    Technician Comment:  Good patient effort & cooperation.  The results of this test meet the ATS/ERS standards for acceptability & reproducibility.  Test was performed on the [employer] Body Plethysmograph-Elite DX system.  Predicted values were [facility]-3 for spirometry, [facility] for DLCO, ITS for Lung Volumes.  The DLCO was uncorrected for Hgb.  A bronchodilator of Ventolin HFA -2puffs via spacer administered.  DLCO performed during dilation period.    The FVC is 3.48 L 122%, FEV1 is 2.91 L 135%, FEV1/FVC 84%.  %.  DLCO 133%.    Interpretation:  Supratherapeutic lung capacity, which is a normal variant.

## 2018-12-04 DIAGNOSIS — D68.2 FACTOR V DEFICIENCY (HCC): ICD-10-CM

## 2018-12-05 ENCOUNTER — OFFICE VISIT (OUTPATIENT)
Dept: MEDICAL GROUP | Facility: PHYSICIAN GROUP | Age: 68
End: 2018-12-05
Payer: MEDICARE

## 2018-12-05 VITALS
BODY MASS INDEX: 29.53 KG/M2 | HEIGHT: 64 IN | OXYGEN SATURATION: 98 % | TEMPERATURE: 98.3 F | SYSTOLIC BLOOD PRESSURE: 124 MMHG | WEIGHT: 173 LBS | DIASTOLIC BLOOD PRESSURE: 72 MMHG | RESPIRATION RATE: 16 BRPM | HEART RATE: 74 BPM

## 2018-12-05 DIAGNOSIS — M79.651 RIGHT THIGH PAIN: ICD-10-CM

## 2018-12-05 DIAGNOSIS — R79.89 HOMOCYSTEINE LEVEL ABOVE REFERENCE RANGE: ICD-10-CM

## 2018-12-05 DIAGNOSIS — D68.51 FACTOR V LEIDEN MUTATION (HCC): ICD-10-CM

## 2018-12-05 DIAGNOSIS — L81.9 CHANGE IN MULTIPLE PIGMENTED SKIN LESIONS: ICD-10-CM

## 2018-12-05 DIAGNOSIS — I80.02 SUPERFICIAL THROMBOPHLEBITIS OF LEFT LEG: ICD-10-CM

## 2018-12-05 DIAGNOSIS — E88.01 ALPHA-1-ANTITRYPSIN DEFICIENCY (HCC): ICD-10-CM

## 2018-12-05 DIAGNOSIS — N18.30 CHRONIC KIDNEY DISEASE, STAGE 3 (HCC): ICD-10-CM

## 2018-12-05 PROCEDURE — 99214 OFFICE O/P EST MOD 30 MIN: CPT | Performed by: FAMILY MEDICINE

## 2018-12-05 NOTE — ASSESSMENT & PLAN NOTE
Patient had testing done for alpha-1 antitrypsin deficiency as her daughters have this also.  She was found to be positive.  For the alpha-1 antitrypsin P1 MZ (This patient appears to be a heterozygote, having a phenotype of   Pi MZ. The M allele protein product is a normal variant. The Z   allele protein product variant is a deficiency variant.) she has no pulmonary symptoms was evaluated recently by pulmonology and told that she has excellent lung function.  She will follow with them once a year for annual pulmonary function testing..

## 2018-12-05 NOTE — ASSESSMENT & PLAN NOTE
This is a chronic condition patient's GFR is improved to 56 creatinine is normal at 0.99  She reports no lower extremity edema no edema of the face.  Patient is advised on hydration low-salt diet and avoidance of NSAIDs.

## 2018-12-05 NOTE — PROGRESS NOTES
Subjective:   Hermelinda Mosley is a 68 y.o. female here today for evaluation and management of:     Chronic kidney disease, stage 3  This is a chronic condition patient's GFR is improved to 56 creatinine is normal at 0.99  She reports no lower extremity edema no edema of the face.  Patient is advised on hydration low-salt diet and avoidance of NSAIDs.    Factor V Leiden mutation (HCC)  Patient has family history of daughters with Factor V Leiden deficiency patient also has mildly elevated homocystine levels.  She had some superficial thrombophlebitis also had a small PE that was found when she had shortness of breath.  Patient also has factor V Leiden deficiency was advised by cardiology and hematology to continue on Xarelto 10 mg daily.  She is doing well on this with no adverse bleeding events.    Right thigh pain  Continues with PT with improvement.  Ultrasound was negative for DVT.  Pain started after she was jumping with her grandkids.  Since almost resolved will resolve this problem today.    Alpha-1-antitrypsin deficiency (HCC)  Patient had testing done for alpha-1 antitrypsin deficiency as her daughters have this also.  She was found to be positive.  For the alpha-1 antitrypsin P1 MZ (This patient appears to be a heterozygote, having a phenotype of   Pi MZ. The M allele protein product is a normal variant. The Z   allele protein product variant is a deficiency variant.) she has no pulmonary symptoms was evaluated recently by pulmonology and told that she has excellent lung function.  She will follow with them once a year for annual pulmonary function testing..     Results for HERMELINDA MOSLEY (MRN [id number]) as of 12/5/2018 09:09   Ref. Range 11/30/2018 09:52   Cholesterol,Tot Latest Ref Range: 100 - 199 mg/dL 171   Triglycerides Latest Ref Range: 0 - 149 mg/dL 120   HDL Latest Ref Range: >=40 mg/dL 54   LDL Latest Ref Range: <100 mg/dL 93     Hermelinda is a non-smoker, with excellent lung function.  She has  alpha 1 antitrypsin MZ phenotype, and recommend annual screening pulmonary function testing.  Avoidance of smoke exposure encouraged    Current medicines (including changes today)  Current Outpatient Prescriptions   Medication Sig Dispense Refill   • Zoster Vac Recomb Adjuvanted (SHINGRIX) 50 MCG Recon Susp 0.5 mL by Intramuscular route Once for 1 dose. 0.5 mL 1   • cyclobenzaprine (FLEXERIL) 5 MG tablet Take 1-2 Tabs by mouth 3 times a day as needed. (Patient not taking: Reported on 12/3/2018) 30 Tab 1   • calcium acetate (PHOS-LO) 667 MG Cap Take 1,334 mg by mouth 3 times a day, with meals.     • rivaroxaban (XARELTO) 10 MG Tab tablet Take 1 Tab by mouth with dinner. 90 Tab 3   • OMEGA-3 KRILL OIL PO Take 1 tablet by mouth every day.     • Lifitegrast (XIIDRA) 5 % Solution 1 Drop by Ophthalmic route 2 Times a Day.     • Multiple Vitamins-Minerals (MULTIVITAMIN PO) Take  by mouth.     • Cholecalciferol (VITAMIN D PO) Take  by mouth.     • Probiotic Product (PROBIOTIC DAILY PO) Take  by mouth.     • Ascorbic Acid (VITAMIN C PO) Take 500 mg by mouth every day.     • omeprazole (PRILOSEC) 20 MG delayed-release capsule Take 20 mg by mouth every day.       No current facility-administered medications for this visit.      She  has a past medical history of Abdominal pain, unspecified site; Alpha 1-antitrypsin PiMS phenotype; Back pain; Bronchitis; Chickenpox; Degeneration of lumbar or lumbosacral intervertebral disc; Diarrhea; DVT (deep venous thrombosis) (HCC); Esophageal reflux; Korean measles; Hernia of unspecified site of abdominal cavity without mention of obstruction or gangrene (repaired); History of colonoscopy (2005=normal); Influenza; Irritable bowel syndrome; Mumps; Pap smear (due); Pneumonia; Recurrent UTI (9/4/2009); Restless leg syndrome; Screening mammogram (10/30/2008=normal); Superficial thrombophlebitis of left leg (10/20/2017); Swelling (9/4/2009); Tonsillitis; Unspecified hemorrhoids without mention  "of complication; and Vein, varicose (stripped). She also has no past medical history of Encounter for long-term (current) use of other medications.    ROS  No chest pain, no shortness of breath, no abdominal pain       Objective:     Blood pressure 124/72, pulse 74, temperature 36.8 °C (98.3 °F), temperature source Temporal, resp. rate 16, height 1.626 m (5' 4\"), weight 78.5 kg (173 lb), SpO2 98 %, not currently breastfeeding. Body mass index is 29.7 kg/m².   Physical Exam:  Constitutional: Alert, no distress.  Patient without her hearing aids today significant hearing loss.  This is a chronic condition no acute changes  Skin: Warm, dry, good turgor, no rashes in visible areas.  Eye: Equal, round and reactive, conjunctiva clear, lids normal.  ENMT: Lips without lesions, good dentition, oropharynx clear.  Neck: Trachea midline, no masses, no thyromegaly. No cervical or supraclavicular lymphadenopathy  Respiratory: Unlabored respiratory effort, lungs clear to auscultation, no wheezes, no ronchi.  Cardiovascular: Normal S1, S2, no murmur, no edema.  Abdomen: Soft, non-tender, no masses, no hepatosplenomegaly.  Psych: Alert and oriented x3, normal affect and mood.        Assessment and Plan:   The following treatment plan was discussed    1. Chronic kidney disease, stage 3 (HCC)  Improving continue to monitor    2. Factor V Leiden mutation (HCC)  Continue on Xarelto 10 mg indefinitely.  Monitor for any bleeding adverse events.    3. Right thigh pain  Resolving.      Followup: Return in about 6 months (around 6/5/2019) for factor V leiden, CKD, alpha 1 anti tripsin.         "

## 2018-12-05 NOTE — ASSESSMENT & PLAN NOTE
Patient has family history of daughters with Factor V Leiden deficiency patient also has mildly elevated homocystine levels.  She had some superficial thrombophlebitis also had a small PE that was found when she had shortness of breath.  Patient also has factor V Leiden deficiency was advised by cardiology and hematology to continue on Xarelto 10 mg daily.  She is doing well on this with no adverse bleeding events.

## 2018-12-05 NOTE — ASSESSMENT & PLAN NOTE
Continues with PT with improvement.  Ultrasound was negative for DVT.  Pain started after she was jumping with her grandkids.  Since almost resolved will resolve this problem today.

## 2018-12-14 ENCOUNTER — ANTICOAGULATION VISIT (OUTPATIENT)
Dept: MEDICAL GROUP | Facility: PHYSICIAN GROUP | Age: 68
End: 2018-12-14
Payer: MEDICARE

## 2018-12-14 VITALS
BODY MASS INDEX: 29.7 KG/M2 | SYSTOLIC BLOOD PRESSURE: 131 MMHG | HEART RATE: 73 BPM | DIASTOLIC BLOOD PRESSURE: 87 MMHG | WEIGHT: 173 LBS

## 2018-12-14 DIAGNOSIS — Z79.01 LONG TERM (CURRENT) USE OF ANTICOAGULANTS: Primary | ICD-10-CM

## 2018-12-14 DIAGNOSIS — D68.51 FACTOR V LEIDEN MUTATION (HCC): ICD-10-CM

## 2018-12-14 LAB — INR PPP: 1 (ref 2–3.5)

## 2018-12-14 PROCEDURE — 85610 PROTHROMBIN TIME: CPT | Performed by: NURSE PRACTITIONER

## 2018-12-14 PROCEDURE — 99999 PR NO CHARGE: CPT | Performed by: NURSE PRACTITIONER

## 2018-12-14 NOTE — PROGRESS NOTES
Anticoagulation Summary  As of 12/14/2018    INR goal:      TTR:   --   Today's INR:   1.0   Warfarin maintenance plan:   No maintenance plan   Plan last modified:   Fernando Charles, PharmD (12/14/2018)   Next INR check:   6/14/2019   Target end date:   Indefinite    Indications    Factor V Leiden mutation (HCC) [D68.51]  Pulmonary embolism (CMS-HCC) [I26.99] (Resolved) [I26.99]  Long term (current) use of anticoagulants [Z79.01] [Z79.01]             Anticoagulation Episode Summary     INR check location:       Preferred lab:       Send INR reminders to:       Comments:   Xarelto 10Milligrams daily per cardiologist and hematologist.      Anticoagulation Care Providers     Provider Role Specialty Phone number    [facility] Responsible  [phone]    Maribel Long M.D. Responsible Family Medicine [phone]        Anticoagulation Patient Findings       Health Status Since Last Assessment  Any new relevant medical problems, ED visits/hospitalizations, no  Any embolic events (stroke / TIA / systemic embolism) n    Adherence with DOAC Therapy  1 or more missed doses in an average week n/a  • If yes, number of missed doses   BLEEDING RISK ASSESSMENT NB:    Bleeding Risk Assessment  Severe epistaxis  n Hemoptysis  n  Excessive or unusual bruising / hematomas n  GIB / melena / BRBPR / hematemesis  n  Hematuria? Abnormal vaginal bleeding  n  Concerning daily headache or subdural hematoma symptoms  n  Decreasing hemoglobin or new anemia  n  Latest hemoglobin:     Lab Results   Component Value Date/Time    WBC 4.9 10/10/2017 01:57 PM    WBC 4.2 12/30/2009 11:55 AM    RBC 5.31 10/10/2017 01:57 PM    RBC 5.58 (H) 12/30/2009 11:55 AM    HEMOGLOBIN 15.0 10/10/2017 01:57 PM    HEMATOCRIT 45.3 10/10/2017 01:57 PM    MCV 85.3 10/10/2017 01:57 PM    MCV 87 12/30/2009 11:55 AM    MCH 28.2 10/10/2017 01:57 PM    MCH 29.3 12/30/2009 11:55 AM    MCHC 33.1 (L) 10/10/2017 01:57 PM    MPV 10.6 10/10/2017 01:57 PM     NEUTSPOLYS 66.00 10/10/2017 01:57 PM    LYMPHOCYTES 25.10 10/10/2017 01:57 PM    MONOCYTES 6.50 10/10/2017 01:57 PM    EOSINOPHILS 1.40 10/10/2017 01:57 PM    BASOPHILS 0.80 10/10/2017 01:57 PM        EtOH overuse  1-2 per months   Falls, presyncope, syncope, or seizures  n  Uncontrolled hypertension   CREATININE CLEARANCE /    Creatinine Clearance/Renal Function  Latest eGFR / creatinine:  Lab Results   Component Value Date/Time    SODIUM 138 11/30/2018 09:52 AM    POTASSIUM 4.0 11/30/2018 09:52 AM    CHLORIDE 105 11/30/2018 09:52 AM    CO2 28 11/30/2018 09:52 AM    GLUCOSE 77 11/30/2018 09:52 AM    BUN 15 11/30/2018 09:52 AM    CREATININE 0.99 11/30/2018 09:52 AM    CREATININE 0.96 12/30/2009 11:55 AM    BUNCREATRAT 20 12/30/2009 11:55 AM    GLOMRATE 59 (L) 12/30/2009 11:55 AM      • Is eGFR less than 50ml/min  n  If YES, calculate CrCl (see back)  Any recent dehydrating illness or medications added/changed? i.e. diuretics    Drug Interactions  ASA / other antiplatelets.n  NSAID n  Other drug interactions  n (Review med list / OTCs;)  Current Outpatient Prescriptions on File Prior to Visit   Medication Sig Dispense Refill   • cyclobenzaprine (FLEXERIL) 5 MG tablet Take 1-2 Tabs by mouth 3 times a day as needed. (Patient not taking: Reported on 12/3/2018) 30 Tab 1   • calcium acetate (PHOS-LO) 667 MG Cap Take 1,334 mg by mouth 3 times a day, with meals.     • rivaroxaban (XARELTO) 10 MG Tab tablet Take 1 Tab by mouth with dinner. 90 Tab 3   • OMEGA-3 KRILL OIL PO Take 1 tablet by mouth every day.     • Lifitegrast (XIIDRA) 5 % Solution 1 Drop by Ophthalmic route 2 Times a Day.     • Multiple Vitamins-Minerals (MULTIVITAMIN PO) Take  by mouth.     • Cholecalciferol (VITAMIN D PO) Take  by mouth.     • Probiotic Product (PROBIOTIC DAILY PO) Take  by mouth.     • Ascorbic Acid (VITAMIN C PO) Take 500 mg by mouth every day.     • omeprazole (PRILOSEC) 20 MG delayed-release capsule Take 20 mg by mouth every day.        No current facility-administered medications on file prior to visit.        Examination  Blood pressure:      Final Assessment and Recommendations:  Patient appears stable from the anticoagulation standpoint  Benefits of continued DOAC therapy outweigh risks for this patient  Recommend continue current DOAC at same dose    Follow up:  Will follow up with patient in  6 months

## 2019-01-03 ENCOUNTER — OFFICE VISIT (OUTPATIENT)
Dept: DERMATOLOGY | Facility: IMAGING CENTER | Age: 69
End: 2019-01-03
Payer: MEDICARE

## 2019-01-03 ENCOUNTER — HOSPITAL ENCOUNTER (OUTPATIENT)
Facility: MEDICAL CENTER | Age: 69
End: 2019-01-03
Attending: DERMATOLOGY
Payer: MEDICARE

## 2019-01-03 VITALS — TEMPERATURE: 98.2 F | WEIGHT: 170 LBS | HEIGHT: 63 IN | BODY MASS INDEX: 30.12 KG/M2

## 2019-01-03 DIAGNOSIS — D18.01 CHERRY ANGIOMA: ICD-10-CM

## 2019-01-03 DIAGNOSIS — D48.5 NEOPLASM OF UNCERTAIN BEHAVIOR OF SKIN: ICD-10-CM

## 2019-01-03 DIAGNOSIS — D22.9 MULTIPLE NEVI: ICD-10-CM

## 2019-01-03 DIAGNOSIS — L82.1 SEBORRHEIC KERATOSES: ICD-10-CM

## 2019-01-03 LAB — PATHOLOGY CONSULT NOTE: NORMAL

## 2019-01-03 PROCEDURE — 11102 TANGNTL BX SKIN SINGLE LES: CPT | Performed by: DERMATOLOGY

## 2019-01-03 PROCEDURE — 11103 TANGNTL BX SKIN EA SEP/ADDL: CPT | Performed by: DERMATOLOGY

## 2019-01-03 PROCEDURE — 99203 OFFICE O/P NEW LOW 30 MIN: CPT | Mod: 25 | Performed by: DERMATOLOGY

## 2019-01-03 PROCEDURE — 88305 TISSUE EXAM BY PATHOLOGIST: CPT

## 2019-01-03 ASSESSMENT — ENCOUNTER SYMPTOMS
CHILLS: 0
FEVER: 0

## 2019-01-03 NOTE — PROGRESS NOTES
Dermatology New Patient Visit    Chief Complaint   Patient presents with   • Establish Care       Subjective:     HPI:   Hermelinda Mosley is a 68 y.o. female presenting for    Skin cancer screening   HPI: Several skin lesions on chest , back and scalp   Location: chest, back  and scalp   Painful lesion: One on the neck - gets caught on clothes, necklaces  Itching lesion: Yes - one on the back  Enlarging lesion: No  Anything make it better or worse? no     Spot on the superior scalp  Present x months  Painful, thinks has grown  No treatment    Several moles on the backside  Does not monitor regularly  None that are symptomatic   Unsure of any changes    History of skin cancer: No  History of biopsies:Yes, Details:  Yes   History of blistering/severe sunburns:Yes, Details: during youth  Family history of skin cancer:No  Family history of atypical moles: daughter has had a few        Past Medical History:   Diagnosis Date   • Abdominal pain, unspecified site    • Alpha 1-antitrypsin PiMS phenotype    • Back pain    • Bronchitis    • Chickenpox    • Degeneration of lumbar or lumbosacral intervertebral disc    • Diarrhea    • DVT (deep venous thrombosis) (HCC)    • Esophageal reflux    • [place] measles    • Hernia of unspecified site of abdominal cavity without mention of obstruction or gangrene repaired   • History of colonoscopy 2005=normal    Dr. [facility]> [facility]   • Influenza    • Irritable bowel syndrome    • Mumps    • Pap smear due   • Pneumonia    • Recurrent UTI 9/4/2009   • Restless leg syndrome    • Screening mammogram 10/30/2008=normal   • Superficial thrombophlebitis of left leg 10/20/2017   • Swelling 9/4/2009   • Tonsillitis    • Unspecified hemorrhoids without mention of complication    • Vein, varicose stripped       Current Outpatient Prescriptions on File Prior to Visit   Medication Sig Dispense Refill   • cyclobenzaprine (FLEXERIL) 5 MG tablet Take 1-2 Tabs by mouth 3 times a day as  needed. (Patient not taking: Reported on 12/3/2018) 30 Tab 1   • calcium acetate (PHOS-LO) 667 MG Cap Take 1,334 mg by mouth 3 times a day, with meals.     • rivaroxaban (XARELTO) 10 MG Tab tablet Take 1 Tab by mouth with dinner. 90 Tab 3   • OMEGA-3 KRILL OIL PO Take 1 tablet by mouth every day.     • Lifitegrast (XIIDRA) 5 % Solution 1 Drop by Ophthalmic route 2 Times a Day.     • Multiple Vitamins-Minerals (MULTIVITAMIN PO) Take  by mouth.     • Cholecalciferol (VITAMIN D PO) Take  by mouth.     • Probiotic Product (PROBIOTIC DAILY PO) Take  by mouth.     • Ascorbic Acid (VITAMIN C PO) Take 500 mg by mouth every day.     • omeprazole (PRILOSEC) 20 MG delayed-release capsule Take 20 mg by mouth every day.       No current facility-administered medications on file prior to visit.        No Known Allergies    Family History   Problem Relation Age of Onset   • Hypertension Mother    • Diabetes Mother         pre diabetes   • Diabetes Father    • Hypertension Father    • Kidney Disease Father    • Respiratory Disease Brother        Social History     Social History   • Marital status:      Spouse name: N/A   • Number of children: N/A   • Years of education: N/A     Occupational History   • Not on file.     Social History Main Topics   • Smoking status: Former Smoker     Packs/day: 0.25     Years: 4.00     Types: Cigarettes     Quit date: 1/1/1969   • Smokeless tobacco: Never Used      Comment: only smokes 1-2 cigs a couple times a week for 1-2 years   • Alcohol use 0.0 - 1.2 oz/week      Comment: occasional   • Drug use: No   • Sexual activity: Yes     Partners: Male      Comment:      Other Topics Concern   • Not on file     Social History Narrative   • No narrative on file       Review of Systems   Constitutional: Negative for chills and fever.   Skin: Negative for itching and rash.   All other systems reviewed and are negative.       Objective:     A full mucocutaneous exam was completed including:  "scalp, hair, ears, face, eyelids, conjunctiva, lips, gums/tongue/oropharynx, neck, chest breasts, abdomen, back, left and right upper extremities (including hands/digits and fingernails), left and right lower extremities (including feet/toes, toenails), buttocks, including external genitalia with the following pertinent findings listed below. Remaining above-listed examined areas within normal limits / negative for rashes or lesions.    Temperature 36.8 °C (98.2 °F), height 1.6 m (5' 3\"), weight 77.1 kg (170 lb), not currently breastfeeding.    Physical Exam   Constitutional: She is oriented to person, place, and time and well-developed, well-nourished, and in no distress.   HENT:   Head: Normocephalic and atraumatic.       Right Ear: External ear normal.   Left Ear: External ear normal.   Nose: Nose normal.   Mouth/Throat: Oropharynx is clear and moist.   Eyes: Conjunctivae and lids are normal.   Neck: Normal range of motion. Neck supple.   Cardiovascular: Intact distal pulses.    Pulmonary/Chest: Effort normal.   Neurological: She is alert and oriented to person, place, and time.   Skin: Skin is warm and dry.        Psychiatric: Mood and affect normal.   Vitals reviewed.      DATA: none applicable to review    Assessment and Plan:     1. Neoplasm of uncertain behavior of skin - scalp  Procedure Note   Procedure: Biopsy by shave technique  Location: as noted above  Size: as noted in exam  Preoperative diagnosis: IDN, r/o atypia  Risks, benefits and alternatives of procedure discussed and written informed consent obtained. Time out completed. Area of biopsy prepped with alcohol. Anesthesia with 1% lidocaine with epinephrine administered with 30 gauge needle. Shave biopsy of the site performed. Hemostasis achieved with pressure and aluminum chloride. Vaseline applied to wound with bandage. Patient tolerated procedure well and there were no complications. The specimen was sent to the pathology lab by the staff. Wound " care was discussed.  - Pathology Specimen; Future    2. Neoplasm of uncertain behavior of skin - right lower leg  Procedure Note   Procedure: Biopsy by shave technique  Location: as noted above  Size: as noted in exam  Preoperative diagnosis:r/o dysplastic nevus  Risks, benefits and alternatives of procedure discussed and written informed consent obtained. Time out completed. Area of biopsy prepped with alcohol. Anesthesia with 1% lidocaine with epinephrine administered with 30 gauge needle. Shave biopsy of the site performed. Hemostasis achieved with pressure and aluminum chloride. Vaseline applied to wound with bandage. Patient tolerated procedure well and there were no complications. The specimen was sent to the pathology lab by the staff. Wound care was discussed.  - Pathology Specimen; Future    3. Seborrheic keratoses  - Benign-appearing nature of lesions discussed. Advised to return to clinic for any new or concerning changes.    4. Multiple nevi  - Benign-appearing nature of lesions discussed. Advised to return to clinic for any new or concerning changes  - ABCDE's of melanoma discussed    5. Cherry angiomas  - Benign-appearing nature of lesions discussed. Advised to return to clinic for any new or concerning changes.      Followup: Return in about 1 year (around 1/3/2020), or if symptoms worsen or fail to improve.    Yelitza Leal M.D.

## 2019-05-15 DIAGNOSIS — D68.51 FACTOR V LEIDEN MUTATION (HCC): ICD-10-CM

## 2019-06-17 ENCOUNTER — HOSPITAL ENCOUNTER (OUTPATIENT)
Dept: LAB | Facility: MEDICAL CENTER | Age: 69
End: 2019-06-17
Attending: FAMILY MEDICINE
Payer: MEDICARE

## 2019-06-17 DIAGNOSIS — N18.30 CHRONIC KIDNEY DISEASE, STAGE 3 (HCC): ICD-10-CM

## 2019-06-17 DIAGNOSIS — R79.89 HOMOCYSTEINE LEVEL ABOVE REFERENCE RANGE: ICD-10-CM

## 2019-06-17 LAB
ALBUMIN SERPL BCP-MCNC: 4.3 G/DL (ref 3.2–4.9)
ALBUMIN/GLOB SERPL: 1.5 G/DL
ALP SERPL-CCNC: 57 U/L (ref 30–99)
ALT SERPL-CCNC: 15 U/L (ref 2–50)
ANION GAP SERPL CALC-SCNC: 9 MMOL/L (ref 0–11.9)
AST SERPL-CCNC: 17 U/L (ref 12–45)
BILIRUB SERPL-MCNC: 0.5 MG/DL (ref 0.1–1.5)
BUN SERPL-MCNC: 18 MG/DL (ref 8–22)
CALCIUM SERPL-MCNC: 9.8 MG/DL (ref 8.5–10.5)
CHLORIDE SERPL-SCNC: 107 MMOL/L (ref 96–112)
CO2 SERPL-SCNC: 26 MMOL/L (ref 20–33)
CREAT SERPL-MCNC: 0.98 MG/DL (ref 0.5–1.4)
FASTING STATUS PATIENT QL REPORTED: NORMAL
FOLATE SERPL-MCNC: >23.8 NG/ML
GLOBULIN SER CALC-MCNC: 2.8 G/DL (ref 1.9–3.5)
GLUCOSE SERPL-MCNC: 84 MG/DL (ref 65–99)
POTASSIUM SERPL-SCNC: 4.4 MMOL/L (ref 3.6–5.5)
PROT SERPL-MCNC: 7.1 G/DL (ref 6–8.2)
SODIUM SERPL-SCNC: 142 MMOL/L (ref 135–145)

## 2019-06-17 PROCEDURE — 36415 COLL VENOUS BLD VENIPUNCTURE: CPT

## 2019-06-17 PROCEDURE — 82746 ASSAY OF FOLIC ACID SERUM: CPT

## 2019-06-17 PROCEDURE — 80053 COMPREHEN METABOLIC PANEL: CPT

## 2019-06-21 ENCOUNTER — ANTICOAGULATION VISIT (OUTPATIENT)
Dept: MEDICAL GROUP | Facility: PHYSICIAN GROUP | Age: 69
End: 2019-06-21
Payer: MEDICARE

## 2019-06-21 VITALS
HEART RATE: 75 BPM | SYSTOLIC BLOOD PRESSURE: 110 MMHG | DIASTOLIC BLOOD PRESSURE: 70 MMHG | WEIGHT: 170 LBS | BODY MASS INDEX: 30.11 KG/M2

## 2019-06-21 DIAGNOSIS — Z79.01 LONG TERM (CURRENT) USE OF ANTICOAGULANTS: ICD-10-CM

## 2019-06-21 DIAGNOSIS — D68.51 FACTOR V LEIDEN MUTATION (HCC): ICD-10-CM

## 2019-06-21 LAB — INR PPP: 1.1 (ref 2–3.5)

## 2019-06-21 PROCEDURE — 93793 ANTICOAG MGMT PT WARFARIN: CPT | Performed by: FAMILY MEDICINE

## 2019-06-21 PROCEDURE — 85610 PROTHROMBIN TIME: CPT | Performed by: FAMILY MEDICINE

## 2019-06-21 NOTE — PROGRESS NOTES
Anticoagulation Summary  As of 2019    INR goal:      TTR:   --   INR used for dosin.10 (2019)   Warfarin maintenance plan:   No maintenance plan   Plan last modified:   Fernando Charles, PharmD (2019)   Next INR check:      Target end date:   Indefinite    Indications    Factor V Leiden mutation (HCC) [D68.51]  Pulmonary embolism (CMS-HCC) [I26.99] (Resolved) [I26.99]  Long term (current) use of anticoagulants [Z79.01] [Z79.01]             Anticoagulation Episode Summary     INR check location:       Preferred lab:       Send INR reminders to:       Comments:   Xarelto 10Milligrams daily per cardiologist and hematologist.      Anticoagulation Care Providers     Provider Role Specialty Phone number    [facility] Responsible  [phone]    Maribel Lnog M.D. Responsible [facility] [phone]        Anticoagulation Patient Findings           Health Status Since Last Assessment  Any new relevant medical problems, ED visits/hospitalizations - n  Any embolic events (stroke / TIA / systemic embolism) - n    Adherence with DOAC Therapy  0- missed dose(s)  BLEEDING RISK ASSESSMENT NB:    Bleeding Risk Assessment  Severe epistaxis - n   Hemoptysis - n  Excessive or unusual bruising / hematomas - n  GIB/melena/BRBPR/hematemesis - n  Hematuria - n   Abnormal vaginal bleeding - n  Concerning daily headache or subdural hematoma symptoms - n  Decreasing hemoglobin or new anemia - n  Falls, presyncope, syncope, or seizures - n  Platelets: n  Latest hemoglobin:     Lab Results   Component Value Date/Time    WBC 4.9 10/10/2017 01:57 PM    WBC 4.2 2009 11:55 AM    RBC 5.31 10/10/2017 01:57 PM    RBC 5.58 (H) 2009 11:55 AM    HEMOGLOBIN 15.0 10/10/2017 01:57 PM    HEMATOCRIT 45.3 10/10/2017 01:57 PM    MCV 85.3 10/10/2017 01:57 PM    MCV 87 2009 11:55 AM    MCH 28.2 10/10/2017 01:57 PM    MCH 29.3 2009 11:55 AM    MCHC 33.1 (L) 10/10/2017 01:57 PM    MPV  10.6 10/10/2017 01:57 PM    NEUTSPOLYS 66.00 10/10/2017 01:57 PM    LYMPHOCYTES 25.10 10/10/2017 01:57 PM    MONOCYTES 6.50 10/10/2017 01:57 PM    EOSINOPHILS 1.40 10/10/2017 01:57 PM    BASOPHILS 0.80 10/10/2017 01:57 PM        HAS-BLED:  Hypertension (uncontrolled, >160 mmHg systolic) - n  Renal disease (dialysis, transplant, Cr >2.26 mg/dL or >200 µmol/L) - n  Liver disease (cirrhosis or bilirubin >2x normal with AST/ALT/AP >3x normal) - n  Stroke history nnPrior major bleeding or predisposition to bleeding - n  Labile INR Unstable/high INRs, time in therapeutic range <60% - n  Age >65 - y  Medication usage predisposing to bleeding (aspirin, clopidogrel, NSAIDs) - n  Alcohol use  ?8 drinks/week - n      Creatinine Clearance/Renal Function  Latest eGFR / creatinine:  Lab Results   Component Value Date/Time    SODIUM 142 06/17/2019 12:09 PM    POTASSIUM 4.4 06/17/2019 12:09 PM    CHLORIDE 107 06/17/2019 12:09 PM    CO2 26 06/17/2019 12:09 PM    GLUCOSE 84 06/17/2019 12:09 PM    BUN 18 06/17/2019 12:09 PM    CREATININE 0.98 06/17/2019 12:09 PM    CREATININE 0.96 12/30/2009 11:55 AM    BUNCREATRAT 20 12/30/2009 11:55 AM    GLOMRATE 59 (L) 12/30/2009 11:55 AM      • Is eGFR less than 50ml/min -n      If YES, calculate CrCl (see back)  Any recent dehydrating illness or medications added/changed? i.e. Diuretics      Drug Interactions  ASA/antiplatelets - n  NSAID - n  Other drug interactions - n (Review med list / OTCs;)  Current Outpatient Prescriptions on File Prior to Visit   Medication Sig Dispense Refill   • rivaroxaban (XARELTO) 10 MG Tab tablet Take 1 Tab by mouth with dinner. 90 Tab 3   • cyclobenzaprine (FLEXERIL) 5 MG tablet Take 1-2 Tabs by mouth 3 times a day as needed. (Patient not taking: Reported on 12/3/2018) 30 Tab 1   • calcium acetate (PHOS-LO) 667 MG Cap Take 1,334 mg by mouth 3 times a day, with meals.     • OMEGA-3 KRILL OIL PO Take 1 tablet by mouth every day.     • Lifitegrast (XIIDRA) 5 %  Solution 1 Drop by Ophthalmic route 2 Times a Day.     • Multiple Vitamins-Minerals (MULTIVITAMIN PO) Take  by mouth.     • Cholecalciferol (VITAMIN D PO) Take  by mouth.     • Probiotic Product (PROBIOTIC DAILY PO) Take  by mouth.     • Ascorbic Acid (VITAMIN C PO) Take 500 mg by mouth every day.     • omeprazole (PRILOSEC) 20 MG delayed-release capsule Take 20 mg by mouth every day.       No current facility-administered medications on file prior to visit.      Verified no anticonvulsant or azole therapy, education provided for future use.      Examination  Blood pressure:  wnl    Final Assessment and Recommendations:  Patient appears stable from the anticoagulation standpoint  Benefits of continued DOAC therapy outweigh risks for this patient  Recommend continue current DOAC at same dose of 10mg daily       Go to the ER for any signs/symptoms of prolonged bleeding lasting > 20 minutes without stop or for any shortness of breath or pain with breathing or pain/redness/swelling to any extremity  Let all your providers know you take this medication  Don't stop this medication without permission from your doctor or our clinic   refills before you run out  Try not to miss doses  Elevate your leg as much as possible  Wear compression stockings when standing for prolonged periods of time  Let us know of any medication changes      Follow up:  Will follow up with patient in 6 months

## 2019-08-08 ENCOUNTER — OFFICE VISIT (OUTPATIENT)
Dept: MEDICAL GROUP | Facility: PHYSICIAN GROUP | Age: 69
End: 2019-08-08
Payer: MEDICARE

## 2019-08-08 VITALS
HEART RATE: 74 BPM | OXYGEN SATURATION: 95 % | SYSTOLIC BLOOD PRESSURE: 116 MMHG | WEIGHT: 165 LBS | TEMPERATURE: 98.9 F | DIASTOLIC BLOOD PRESSURE: 80 MMHG | RESPIRATION RATE: 16 BRPM | BODY MASS INDEX: 30.36 KG/M2 | HEIGHT: 62 IN

## 2019-08-08 DIAGNOSIS — Z12.39 BREAST CANCER SCREENING: ICD-10-CM

## 2019-08-08 DIAGNOSIS — N18.30 CHRONIC KIDNEY DISEASE, STAGE 3 (HCC): ICD-10-CM

## 2019-08-08 DIAGNOSIS — E66.9 OBESITY (BMI 30-39.9): ICD-10-CM

## 2019-08-08 DIAGNOSIS — Z11.59 ENCOUNTER FOR HEPATITIS C SCREENING TEST FOR LOW RISK PATIENT: ICD-10-CM

## 2019-08-08 DIAGNOSIS — Z79.01 LONG TERM (CURRENT) USE OF ANTICOAGULANTS: ICD-10-CM

## 2019-08-08 DIAGNOSIS — E88.01 ALPHA-1-ANTITRYPSIN DEFICIENCY (HCC): ICD-10-CM

## 2019-08-08 DIAGNOSIS — Z23 NEED FOR VACCINATION: ICD-10-CM

## 2019-08-08 DIAGNOSIS — E55.9 VITAMIN D DEFICIENCY DISEASE: ICD-10-CM

## 2019-08-08 DIAGNOSIS — E78.5 DYSLIPIDEMIA: ICD-10-CM

## 2019-08-08 DIAGNOSIS — D68.51 FACTOR V LEIDEN MUTATION (HCC): ICD-10-CM

## 2019-08-08 PROCEDURE — G0010 ADMIN HEPATITIS B VACCINE: HCPCS | Performed by: FAMILY MEDICINE

## 2019-08-08 PROCEDURE — 99214 OFFICE O/P EST MOD 30 MIN: CPT | Mod: 25 | Performed by: FAMILY MEDICINE

## 2019-08-08 PROCEDURE — 90746 HEPB VACCINE 3 DOSE ADULT IM: CPT | Performed by: FAMILY MEDICINE

## 2019-08-08 RX ORDER — AMITRIPTYLINE HYDROCHLORIDE 25 MG/1
25 TABLET, FILM COATED ORAL NIGHTLY PRN
Qty: 90 TAB | Refills: 3 | Status: SHIPPED
Start: 2019-08-08 | End: 2020-02-25

## 2019-08-08 RX ORDER — ZOSTER VACCINE RECOMBINANT, ADJUVANTED 50 MCG/0.5
KIT INTRAMUSCULAR
Refills: 0 | COMMUNITY
Start: 2019-06-03 | End: 2020-02-25

## 2019-08-08 ASSESSMENT — PATIENT HEALTH QUESTIONNAIRE - PHQ9
SUM OF ALL RESPONSES TO PHQ QUESTIONS 1-9: 4
5. POOR APPETITE OR OVEREATING: 1 - SEVERAL DAYS
CLINICAL INTERPRETATION OF PHQ2 SCORE: 1

## 2019-08-08 NOTE — ASSESSMENT & PLAN NOTE
Continues on xarelto for hx of small PE and superficial throbophebitis. Has no adverse bleeding events.  Routine INRs are not needed for monitoring this medication.  We will continue with an annual CBC CMP.

## 2019-08-08 NOTE — PROGRESS NOTES
Subjective:   Hermelinda Mosley is a 69 y.o. female here today for evaluation and management of:     Factor V Leiden mutation (HCC)  Continues on xarelto for hx of small PE and superficial throbophebitis. Has no adverse bleeding events.  Routine INRs are not needed for monitoring this medication.  We will continue with an annual CBC CMP.    Chronic kidney disease, stage 3  Stable at GFR 56.  Patient encouraged to continue staying well-hydrated avoid NSAIDs avoid high salt diets.  Recheck this every 6 to 12 months.    Alpha-1-antitrypsin deficiency (HCC)  Patient will follow-up once a year with her pulmonologist for annual PFTs due to being positive for alpha-1 antitrypsin deficiency.  Patient currently has no wheezing or shortness of breath.    Vitamin D deficiency disease  Vitamin D levels in 2016 and 17 are normal on her vitamin D supplement.  Repeat lab ordered patient can continue on a daily vitamin D supplement.  Osteopenia seen on a DEXA scan in the past.     A few years ago patient bumped the top of her head went camping on her camper.  Since then she has noted the sensitivity burning and pain like tingling and burning crawling sensation from the top of her head down both sides of her face and this sometimes irritates her at night when trying to sleep.  She tried changing her hair products to see if this would help but this did not make a difference.  Patient was concerned since she is on a blood thinner examination reveals no hematoma or concerns on the skin patient is also been evaluated by her dermatologist and cleared.  I did prescribe her some Elavil today to see if this can help calm down the nerves in that area  Current medicines (including changes today)  Current Outpatient Medications   Medication Sig Dispense Refill   • amitriptyline (ELAVIL) 25 MG Tab Take 1 Tab by mouth at bedtime as needed. 90 Tab 3   • rivaroxaban (XARELTO) 10 MG Tab tablet Take 1 Tab by mouth with dinner. 90 Tab 3   •  cyclobenzaprine (FLEXERIL) 5 MG tablet Take 1-2 Tabs by mouth 3 times a day as needed. 30 Tab 1   • calcium acetate (PHOS-LO) 667 MG Cap Take 1,334 mg by mouth 3 times a day, with meals.     • Lifitegrast (XIIDRA) 5 % Solution 1 Drop by Ophthalmic route 2 Times a Day.     • Multiple Vitamins-Minerals (MULTIVITAMIN PO) Take  by mouth.     • Cholecalciferol (VITAMIN D PO) Take  by mouth.     • Probiotic Product (PROBIOTIC DAILY PO) Take  by mouth.     • Ascorbic Acid (VITAMIN C PO) Take 500 mg by mouth every day.     • omeprazole (PRILOSEC) 20 MG delayed-release capsule Take 20 mg by mouth every day.     • SHINGRIX 50 MCG/0.5ML Recon Susp PHARMACIST ADMINISTERED IMMUNIZATION ADMINISTERED AT TIME OF DISPENSING  0   • OMEGA-3 KRILL OIL PO Take 1 tablet by mouth every day.       No current facility-administered medications for this visit.      She  has a past medical history of Abdominal pain, unspecified site, Alpha 1-antitrypsin PiMS phenotype, Back pain, Bronchitis, Chickenpox, Degeneration of lumbar or lumbosacral intervertebral disc, Diarrhea, DVT (deep venous thrombosis) (HCC), Esophageal reflux, [place] measles, Hernia of unspecified site of abdominal cavity without mention of obstruction or gangrene (repaired), History of colonoscopy (2005=normal), Influenza, Irritable bowel syndrome, Mumps, Pap smear (due), Pneumonia, Recurrent UTI (9/4/2009), Restless leg syndrome, Screening mammogram (10/30/2008=normal), Superficial thrombophlebitis of left leg (10/20/2017), Swelling (9/4/2009), Tonsillitis, Unspecified hemorrhoids without mention of complication, and Vein, varicose (stripped). She also has no past medical history of Encounter for long-term (current) use of other medications.    ROS  No chest pain, no shortness of breath, no abdominal pain       Objective:     /80 (BP Location: Right arm, Patient Position: Sitting, BP Cuff Size: Adult)   Pulse 74   Temp 37.2 °C (98.9 °F) (Temporal)   Resp 16   Ht  "1.575 m (5' 2\")   Wt 74.8 kg (165 lb)   SpO2 95%  Body mass index is 30.18 kg/m².   Physical Exam:  Constitutional: Alert, no distress.  Skin: Warm, dry, good turgor, no rashes in visible areas.  Eye: Equal, round and reactive, conjunctiva clear, lids normal.  ENMT: Lips without lesions, good dentition, oropharynx clear.  Neck: Trachea midline, no masses, no thyromegaly. No cervical or supraclavicular lymphadenopathy  Respiratory: Unlabored respiratory effort, lungs clear to auscultation, no wheezes, no ronchi.  Cardiovascular: Normal S1, S2, no murmur, no edema.  Abdomen: Soft, non-tender, no masses, no hepatosplenomegaly.  Psych: Alert and oriented x3, normal affect and mood.        Assessment and Plan:   The following treatment plan was discussed    1. Breast cancer screenin  - MA-SCREEN MAMMO W/CAD-BILAT; Future    2. Need for vaccination  - Hepatitis B Vaccine Adult IM    3. Encounter for hepatitis C screening test for low risk patient  - HEP C VIRUS ANTIBODY; Future    4. Factor V Leiden mutation (HCC)  Stable on xarelto    5. Chronic kidney disease, stage 3 (HCC)  Stable.     6. Alpha-1-antitrypsin deficiency (HCC)  Stable. Follow up with pulmonology    7. Vitamin D deficiency disease  Stable  Continue daily supplement.       Followup: Return in about 6 months (around 2/8/2020) for review labs, chronic anticoagulation, ckd.         "

## 2019-08-08 NOTE — ASSESSMENT & PLAN NOTE
Patient will follow-up once a year with her pulmonologist for annual PFTs due to being positive for alpha-1 antitrypsin deficiency.  Patient currently has no wheezing or shortness of breath.

## 2019-08-08 NOTE — ASSESSMENT & PLAN NOTE
Vitamin D levels in 2016 and 17 are normal on her vitamin D supplement.  Repeat lab ordered patient can continue on a daily vitamin D supplement.  Osteopenia seen on a DEXA scan in the past.

## 2019-08-08 NOTE — ASSESSMENT & PLAN NOTE
Stable at GFR 56.  Patient encouraged to continue staying well-hydrated avoid NSAIDs avoid high salt diets.  Recheck this every 6 to 12 months.

## 2019-08-12 ENCOUNTER — ANTICOAGULATION MONITORING (OUTPATIENT)
Dept: VASCULAR LAB | Facility: MEDICAL CENTER | Age: 69
End: 2019-08-12

## 2019-08-12 DIAGNOSIS — D68.51 FACTOR V LEIDEN MUTATION (HCC): ICD-10-CM

## 2019-08-12 DIAGNOSIS — Z79.01 LONG TERM (CURRENT) USE OF ANTICOAGULANTS: ICD-10-CM

## 2019-08-12 NOTE — PROGRESS NOTES
Chart reviewed in accordance with DOAC protocol.  Pt AC status was recently evaluated by PCP who indicated they will continue management of Xarelto at this time with yearly CBC, CMP.  Will defer to PCP and discharge from AC clinic.    April MCDANIELS  [facility]

## 2019-08-23 ENCOUNTER — APPOINTMENT (OUTPATIENT)
Dept: RADIOLOGY | Facility: MEDICAL CENTER | Age: 69
End: 2019-08-23
Attending: FAMILY MEDICINE
Payer: MEDICARE

## 2019-08-27 ENCOUNTER — HOSPITAL ENCOUNTER (OUTPATIENT)
Dept: RADIOLOGY | Facility: MEDICAL CENTER | Age: 69
End: 2019-08-27
Attending: FAMILY MEDICINE
Payer: MEDICARE

## 2019-08-27 DIAGNOSIS — Z12.39 BREAST CANCER SCREENING: ICD-10-CM

## 2019-08-27 PROCEDURE — 77063 BREAST TOMOSYNTHESIS BI: CPT

## 2019-08-27 NOTE — RESULT ENCOUNTER NOTE
Hermelinda  Your mammogram was normal! Next is due in one year.   Please let me know immediately if you notice any new lumps/rashes/pain/nipple discharge.  Maribel Long M.D.

## 2019-09-17 ENCOUNTER — NON-PROVIDER VISIT (OUTPATIENT)
Dept: MEDICAL GROUP | Facility: PHYSICIAN GROUP | Age: 69
End: 2019-09-17
Payer: MEDICARE

## 2019-09-17 ENCOUNTER — TELEPHONE (OUTPATIENT)
Dept: URGENT CARE | Facility: PHYSICIAN GROUP | Age: 69
End: 2019-09-17

## 2019-09-17 DIAGNOSIS — Z23 NEED FOR VACCINATION: ICD-10-CM

## 2019-09-17 PROCEDURE — G0010 ADMIN HEPATITIS B VACCINE: HCPCS | Performed by: NURSE PRACTITIONER

## 2019-09-17 PROCEDURE — 90746 HEPB VACCINE 3 DOSE ADULT IM: CPT | Performed by: NURSE PRACTITIONER

## 2019-09-17 NOTE — TELEPHONE ENCOUNTER
Patient is at [place] and would like to complete her Hep B While shes there. Please sign as they are willing to provide the Hep B

## 2019-09-18 NOTE — PROGRESS NOTES
Hermelinda Mosley is a 69 y.o. female here for a non-provider visit for Hep B injection.    Reason for injection: 2nd dose  Order in MAR?: Yes  Patient supplied?:No  Minimum interval has been met for this injection (per MAR order): Yes    Order and dose verified by: MO  Patient tolerated injection and no adverse effects were observed or reported: Yes    # of Administrations remaining in MAR: 1

## 2019-10-03 ENCOUNTER — NON-PROVIDER VISIT (OUTPATIENT)
Dept: MEDICAL GROUP | Facility: PHYSICIAN GROUP | Age: 69
End: 2019-10-03
Payer: MEDICARE

## 2019-10-03 DIAGNOSIS — Z23 NEED FOR VACCINATION: ICD-10-CM

## 2019-10-03 PROCEDURE — 90662 IIV NO PRSV INCREASED AG IM: CPT | Performed by: FAMILY MEDICINE

## 2019-10-03 PROCEDURE — G0008 ADMIN INFLUENZA VIRUS VAC: HCPCS | Performed by: FAMILY MEDICINE

## 2019-10-03 NOTE — PROGRESS NOTES
"Hermelinda Mosley is a 69 y.o. female here for a non-provider visit for:   FLU    Reason for immunization: Annual Flu Vaccine  Immunization records indicate need for vaccine: Yes, confirmed with Epic  Minimum interval has been met for this vaccine: Yes  ABN completed: Yes    Order and dose verified by: ka  VIS Dated  8/15/19 was given to patient: Yes  All IAC Questionnaire questions were answered \"No.\"    Patient tolerated injection and no adverse effects were observed or reported: Yes    Pt scheduled for next dose in series: Not Indicated  "

## 2019-11-25 ENCOUNTER — TELEPHONE (OUTPATIENT)
Dept: VASCULAR LAB | Facility: MEDICAL CENTER | Age: 69
End: 2019-11-25

## 2019-12-06 ENCOUNTER — TELEPHONE (OUTPATIENT)
Dept: VASCULAR LAB | Facility: MEDICAL CENTER | Age: 69
End: 2019-12-06

## 2019-12-09 ENCOUNTER — TELEPHONE (OUTPATIENT)
Dept: VASCULAR LAB | Facility: MEDICAL CENTER | Age: 69
End: 2019-12-09

## 2019-12-26 ENCOUNTER — ANTICOAGULATION VISIT (OUTPATIENT)
Dept: MEDICAL GROUP | Facility: PHYSICIAN GROUP | Age: 69
End: 2019-12-26
Payer: MEDICARE

## 2019-12-26 DIAGNOSIS — Z79.01 LONG TERM (CURRENT) USE OF ANTICOAGULANTS: ICD-10-CM

## 2019-12-26 DIAGNOSIS — D68.51 FACTOR V LEIDEN MUTATION (HCC): ICD-10-CM

## 2019-12-26 LAB — INR PPP: 1.2 (ref 2–3.5)

## 2019-12-26 PROCEDURE — 85610 PROTHROMBIN TIME: CPT | Performed by: FAMILY MEDICINE

## 2019-12-26 PROCEDURE — 99211 OFF/OP EST MAY X REQ PHY/QHP: CPT | Performed by: FAMILY MEDICINE

## 2019-12-26 NOTE — PROGRESS NOTES
Anticoagulation Summary  As of 2019    INR goal:      TTR:   --   INR used for dosin.20 (2019)   Warfarin maintenance plan:   No maintenance plan   Plan last modified:   Fernando Charles, PharmD (2019)   Next INR check:   2020   Target end date:   Indefinite    Indications    Factor V Leiden mutation (HCC) [D68.51]  Pulmonary embolism (CMS-HCC) [I26.99] (Resolved) [I26.99]  Long term (current) use of anticoagulants [Z79.01] [Z79.01]             Anticoagulation Episode Summary     INR check location:       Preferred lab:       Send INR reminders to:       Comments:   Xarelto 10Milligrams daily per cardiologist and hematologist.      Anticoagulation Care Providers     Provider Role Specialty Phone number    [facility] Responsible  [phone]    Maribel Long M.D. Responsible Family Medicine [phone]        Anticoagulation Patient Findings         Anticoagulation Patient Findings  Health Status Since Last Assessment  Any new relevant medical problems, ED visits/hospitalizations -no   Any embolic events (stroke / TIA / systemic embolism)-  no     Adherence with DOAC Therapy  1 or more missed doses in an average week-  no  • If yes, number of missed doses n/a  BLEEDING RISK ASSESSMENT NB:     Bleeding Risk Assessment  Severe epistaxis  n Hemoptysis  n  Excessive or unusual bruising / hematomas n  GIB / melena / BRBPR / hematemesis  n  Hematuria? Abnormal vaginal bleeding  n  Concerning daily headache or subdural hematoma symptoms  n  Decreasing hemoglobin or new anemia  n  Latest hemoglobin:  Lab Results   Component Value Date/Time    WBC 4.9 10/10/2017 01:57 PM    WBC 4.2 2009 11:55 AM    RBC 5.31 10/10/2017 01:57 PM    RBC 5.58 (H) 2009 11:55 AM    HEMOGLOBIN 15.0 10/10/2017 01:57 PM    HEMATOCRIT 45.3 10/10/2017 01:57 PM    MCV 85.3 10/10/2017 01:57 PM    MCV 87 2009 11:55 AM    MCH 28.2 10/10/2017 01:57 PM    MCH 29.3 2009 11:55 AM     MCHC 33.1 (L) 10/10/2017 01:57 PM    MPV 10.6 10/10/2017 01:57 PM    NEUTSPOLYS 66.00 10/10/2017 01:57 PM    LYMPHOCYTES 25.10 10/10/2017 01:57 PM    MONOCYTES 6.50 10/10/2017 01:57 PM    EOSINOPHILS 1.40 10/10/2017 01:57 PM    BASOPHILS 0.80 10/10/2017 01:57 PM      LFTs wnl    EtOH overuse  n  Falls, presyncope, syncope, or seizures  n  Uncontrolled hypertension n  CREATININE CLEARANCE /     Creatinine Clearance/Renal Function  Latest eGFR / creatinine:     Lab Results   Component Value Date/Time    SODIUM 142 06/17/2019 12:09 PM    POTASSIUM 4.4 06/17/2019 12:09 PM    CHLORIDE 107 06/17/2019 12:09 PM    CO2 26 06/17/2019 12:09 PM    GLUCOSE 84 06/17/2019 12:09 PM    BUN 18 06/17/2019 12:09 PM    CREATININE 0.98 06/17/2019 12:09 PM    CREATININE 0.96 12/30/2009 11:55 AM    BUNCREATRAT 20 12/30/2009 11:55 AM    GLOMRATE 59 (L) 12/30/2009 11:55 AM        • Is eGFR less than 50ml/min  wnl  If YES, calculate CrCl (see back)  Any recent dehydrating illness or medications added/changed? i.e. diuretics     Drug Interactions  ASA / other antiplatelets. no  NSAID no  Other drug interactions  no (Review med list / OTCs;)    Current Outpatient Medications:   •  SHINGRIX 50 MCG/0.5ML Recon Susp, PHARMACIST ADMINISTERED IMMUNIZATION ADMINISTERED AT TIME OF DISPENSING, Disp: , Rfl: 0  •  amitriptyline (ELAVIL) 25 MG Tab, Take 1 Tab by mouth at bedtime as needed., Disp: 90 Tab, Rfl: 3  •  rivaroxaban (XARELTO) 10 MG Tab tablet, Take 1 Tab by mouth with dinner., Disp: 90 Tab, Rfl: 3  •  cyclobenzaprine (FLEXERIL) 5 MG tablet, Take 1-2 Tabs by mouth 3 times a day as needed., Disp: 30 Tab, Rfl: 1  •  calcium acetate (PHOS-LO) 667 MG Cap, Take 1,334 mg by mouth 3 times a day, with meals., Disp: , Rfl:   •  OMEGA-3 KRILL OIL PO, Take 1 tablet by mouth every day., Disp: , Rfl:   •  Lifitegrast (XIIDRA) 5 % Solution, 1 Drop by Ophthalmic route 2 Times a Day., Disp: , Rfl:   •  Multiple Vitamins-Minerals (MULTIVITAMIN PO), Take  by  mouth., Disp: , Rfl:   •  Cholecalciferol (VITAMIN D PO), Take  by mouth., Disp: , Rfl:   •  Probiotic Product (PROBIOTIC DAILY PO), Take  by mouth., Disp: , Rfl:   •  Ascorbic Acid (VITAMIN C PO), Take 500 mg by mouth every day., Disp: , Rfl:   •  omeprazole (PRILOSEC) 20 MG delayed-release capsule, Take 20 mg by mouth every day., Disp: , Rfl:           Final Assessment and Recommendations:  Patient appears stable from the anticoagulation standpoint  Benefits of continued DOAC therapy outweigh risks for this patient  Recommend continue current DOAC at same dose of 10mg once daily         Will follow up with patient in  6 months  with labs

## 2020-02-24 ENCOUNTER — HOSPITAL ENCOUNTER (OUTPATIENT)
Dept: LAB | Facility: MEDICAL CENTER | Age: 70
End: 2020-02-24
Attending: FAMILY MEDICINE
Payer: MEDICARE

## 2020-02-24 DIAGNOSIS — E55.9 VITAMIN D DEFICIENCY DISEASE: ICD-10-CM

## 2020-02-24 DIAGNOSIS — E78.5 DYSLIPIDEMIA: ICD-10-CM

## 2020-02-24 DIAGNOSIS — Z79.01 LONG TERM (CURRENT) USE OF ANTICOAGULANTS: ICD-10-CM

## 2020-02-24 DIAGNOSIS — D68.51 FACTOR V LEIDEN MUTATION (HCC): ICD-10-CM

## 2020-02-24 DIAGNOSIS — Z11.59 ENCOUNTER FOR HEPATITIS C SCREENING TEST FOR LOW RISK PATIENT: ICD-10-CM

## 2020-02-24 DIAGNOSIS — N18.30 CHRONIC KIDNEY DISEASE, STAGE 3: ICD-10-CM

## 2020-02-24 LAB
ALBUMIN SERPL BCP-MCNC: 4.2 G/DL (ref 3.2–4.9)
ALBUMIN/GLOB SERPL: 1.5 G/DL
ALP SERPL-CCNC: 53 U/L (ref 30–99)
ALT SERPL-CCNC: 21 U/L (ref 2–50)
ANION GAP SERPL CALC-SCNC: 8 MMOL/L (ref 0–11.9)
AST SERPL-CCNC: 23 U/L (ref 12–45)
BASOPHILS # BLD AUTO: 0.5 % (ref 0–1.8)
BASOPHILS # BLD: 0.02 K/UL (ref 0–0.12)
BILIRUB SERPL-MCNC: 0.4 MG/DL (ref 0.1–1.5)
BUN SERPL-MCNC: 21 MG/DL (ref 8–22)
CALCIUM SERPL-MCNC: 9.6 MG/DL (ref 8.5–10.5)
CHLORIDE SERPL-SCNC: 106 MMOL/L (ref 96–112)
CHOLEST SERPL-MCNC: 193 MG/DL (ref 100–199)
CO2 SERPL-SCNC: 26 MMOL/L (ref 20–33)
CREAT SERPL-MCNC: 1.06 MG/DL (ref 0.5–1.4)
EOSINOPHIL # BLD AUTO: 0.1 K/UL (ref 0–0.51)
EOSINOPHIL NFR BLD: 2.7 % (ref 0–6.9)
ERYTHROCYTE [DISTWIDTH] IN BLOOD BY AUTOMATED COUNT: 39.7 FL (ref 35.9–50)
FASTING STATUS PATIENT QL REPORTED: NORMAL
GLOBULIN SER CALC-MCNC: 2.8 G/DL (ref 1.9–3.5)
GLUCOSE SERPL-MCNC: 90 MG/DL (ref 65–99)
HCT VFR BLD AUTO: 48 % (ref 37–47)
HDLC SERPL-MCNC: 56 MG/DL
HGB BLD-MCNC: 15.7 G/DL (ref 12–16)
IMM GRANULOCYTES # BLD AUTO: 0 K/UL (ref 0–0.11)
IMM GRANULOCYTES NFR BLD AUTO: 0 % (ref 0–0.9)
LDLC SERPL CALC-MCNC: 106 MG/DL
LYMPHOCYTES # BLD AUTO: 1.04 K/UL (ref 1–4.8)
LYMPHOCYTES NFR BLD: 28.2 % (ref 22–41)
MCH RBC QN AUTO: 29 PG (ref 27–33)
MCHC RBC AUTO-ENTMCNC: 32.7 G/DL (ref 33.6–35)
MCV RBC AUTO: 88.6 FL (ref 81.4–97.8)
MONOCYTES # BLD AUTO: 0.35 K/UL (ref 0–0.85)
MONOCYTES NFR BLD AUTO: 9.5 % (ref 0–13.4)
NEUTROPHILS # BLD AUTO: 2.18 K/UL (ref 2–7.15)
NEUTROPHILS NFR BLD: 59.1 % (ref 44–72)
NRBC # BLD AUTO: 0 K/UL
NRBC BLD-RTO: 0 /100 WBC
PLATELET # BLD AUTO: 266 K/UL (ref 164–446)
PMV BLD AUTO: 10.4 FL (ref 9–12.9)
POTASSIUM SERPL-SCNC: 4.4 MMOL/L (ref 3.6–5.5)
PROT SERPL-MCNC: 7 G/DL (ref 6–8.2)
RBC # BLD AUTO: 5.42 M/UL (ref 4.2–5.4)
SODIUM SERPL-SCNC: 140 MMOL/L (ref 135–145)
TRIGL SERPL-MCNC: 153 MG/DL (ref 0–149)
WBC # BLD AUTO: 3.7 K/UL (ref 4.8–10.8)

## 2020-02-24 PROCEDURE — 86803 HEPATITIS C AB TEST: CPT

## 2020-02-24 PROCEDURE — 36415 COLL VENOUS BLD VENIPUNCTURE: CPT

## 2020-02-24 PROCEDURE — 80053 COMPREHEN METABOLIC PANEL: CPT

## 2020-02-24 PROCEDURE — 85025 COMPLETE CBC W/AUTO DIFF WBC: CPT

## 2020-02-24 PROCEDURE — 82306 VITAMIN D 25 HYDROXY: CPT

## 2020-02-24 PROCEDURE — 80061 LIPID PANEL: CPT

## 2020-02-25 ENCOUNTER — OFFICE VISIT (OUTPATIENT)
Dept: MEDICAL GROUP | Facility: PHYSICIAN GROUP | Age: 70
End: 2020-02-25
Payer: MEDICARE

## 2020-02-25 VITALS
SYSTOLIC BLOOD PRESSURE: 126 MMHG | HEART RATE: 74 BPM | TEMPERATURE: 98.7 F | HEIGHT: 62 IN | DIASTOLIC BLOOD PRESSURE: 86 MMHG | RESPIRATION RATE: 16 BRPM | OXYGEN SATURATION: 97 % | WEIGHT: 171 LBS | BODY MASS INDEX: 31.47 KG/M2

## 2020-02-25 DIAGNOSIS — E55.9 VITAMIN D DEFICIENCY DISEASE: ICD-10-CM

## 2020-02-25 DIAGNOSIS — H04.123 DRY EYES: ICD-10-CM

## 2020-02-25 DIAGNOSIS — Z97.3 WEARS GLASSES: ICD-10-CM

## 2020-02-25 DIAGNOSIS — E88.01 ALPHA-1-ANTITRYPSIN DEFICIENCY (HCC): ICD-10-CM

## 2020-02-25 DIAGNOSIS — Z23 NEED FOR VACCINATION: ICD-10-CM

## 2020-02-25 DIAGNOSIS — E66.9 OBESITY (BMI 30-39.9): ICD-10-CM

## 2020-02-25 DIAGNOSIS — N18.30 CHRONIC KIDNEY DISEASE, STAGE 3: ICD-10-CM

## 2020-02-25 DIAGNOSIS — R23.9 SKIN CHANGE: ICD-10-CM

## 2020-02-25 LAB
25(OH)D3 SERPL-MCNC: 51 NG/ML (ref 30–100)
HCV AB SER QL: NEGATIVE

## 2020-02-25 PROCEDURE — 99214 OFFICE O/P EST MOD 30 MIN: CPT | Mod: 25 | Performed by: FAMILY MEDICINE

## 2020-02-25 PROCEDURE — 90746 HEPB VACCINE 3 DOSE ADULT IM: CPT | Performed by: FAMILY MEDICINE

## 2020-02-25 PROCEDURE — G0010 ADMIN HEPATITIS B VACCINE: HCPCS | Performed by: FAMILY MEDICINE

## 2020-02-25 ASSESSMENT — PATIENT HEALTH QUESTIONNAIRE - PHQ9: CLINICAL INTERPRETATION OF PHQ2 SCORE: 0

## 2020-02-25 NOTE — RESULT ENCOUNTER NOTE
Hermelinda,  Your labs are normal except for  Slight high cholesterol and triglycerides.   Maribel Long M.D.

## 2020-02-25 NOTE — PROGRESS NOTES
Subjective:   Hermelinda Mosley is a 69 y.o. female here today for evaluation and management of:     Vitamin D deficiency disease  Improved with vit D supplement.     Chronic kidney disease, stage 3  GFR slightly decreased   Encouraged hydration, low salt diet. Avoid NSAIDS.     Obesity (BMI 30-39.9)  Smaller portion sizes on carbohydrate, regular activity. Avoid juices and soda.       Alpha-1-antitrypsin deficiency (HCC)  Continues routine follow up with pulmonology.   Clinically stable.      She has generalized itching mostly upper body and scalp. Also has a changing skin lesion on her back and needs to be seen by her dermatologist for her annual skin survey.     Ganglion cyst of right wrist: mild pain but not interfering with her ADLS  If worsening will refer to hand specialist.       Current medicines (including changes today)  Current Outpatient Medications   Medication Sig Dispense Refill   • rivaroxaban (XARELTO) 10 MG Tab tablet Take 1 Tab by mouth with dinner. 90 Tab 3   • calcium acetate (PHOS-LO) 667 MG Cap Take 1,334 mg by mouth 3 times a day, with meals.     • Lifitegrast (XIIDRA) 5 % Solution 1 Drop by Ophthalmic route 2 Times a Day.     • Multiple Vitamins-Minerals (MULTIVITAMIN PO) Take  by mouth.     • Cholecalciferol (VITAMIN D PO) Take  by mouth.     • Probiotic Product (PROBIOTIC DAILY PO) Take  by mouth.     • Ascorbic Acid (VITAMIN C PO) Take 500 mg by mouth every day.     • omeprazole (PRILOSEC) 20 MG delayed-release capsule Take 20 mg by mouth every day.       No current facility-administered medications for this visit.      She  has a past medical history of Abdominal pain, unspecified site, Alpha 1-antitrypsin PiMS phenotype, Back pain, Bronchitis, Chickenpox, Degeneration of lumbar or lumbosacral intervertebral disc, Diarrhea, DVT (deep venous thrombosis) (HCC), Esophageal reflux, [place] measles, Hernia of unspecified site of abdominal cavity without mention of obstruction or gangrene  "(repaired), History of colonoscopy (2005=normal), Influenza, Irritable bowel syndrome, Mumps, Pap smear (due), Pneumonia, Recurrent UTI (9/4/2009), Restless leg syndrome, Screening mammogram (10/30/2008=normal), Superficial thrombophlebitis of left leg (10/20/2017), Swelling (9/4/2009), Tonsillitis, Unspecified hemorrhoids without mention of complication, and Vein, varicose (stripped). She also has no past medical history of Encounter for long-term (current) use of other medications.    ROS  No chest pain, no shortness of breath, no abdominal pain       Objective:     /86   Pulse (!) 40   Temp 37.1 °C (98.7 °F) (Temporal)   Resp 16   Ht 1.575 m (5' 2\")   Wt 77.6 kg (171 lb)   SpO2 97%  Body mass index is 31.28 kg/m².   Physical Exam:  Constitutional: Alert, no distress. Hearing loss.   Skin: Warm, dry, good turgor, no rashes in visible areas.  Eye: Equal, round and reactive, conjunctiva clear, lids normal.  ENMT: Lips without lesions, good dentition, oropharynx clear.  Neck: Trachea midline, no masses, no thyromegaly. No cervical or supraclavicular lymphadenopathy  Respiratory: Unlabored respiratory effort, lungs clear to auscultation, no wheezes, no ronchi.  Cardiovascular: Normal S1, S2, no murmur, mild b/l edema.  Abdomen: Soft, non-tender, no masses, no hepatosplenomegaly.  Psych: Alert and oriented x3, normal affect and mood.        Assessment and Plan:   The following treatment plan was discussed    1. Need for vaccination  - Hepatitis B Vaccine Adult IM    2. Vitamin D deficiency disease  Continue vit D supplement.     3. Chronic kidney disease, stage 3 (HCC)  Slight decline   Recheck labs.     4. Obesity (BMI 30-39.9)  Encouraged gradual weight loss.     5. Alpha-1-antitrypsin deficiency (HCC)  Stable.   Follow up with pulmonology as scheduled.       Followup: Return in about 6 months (around 8/25/2020) for joint pain, CKD.         "

## 2020-03-10 ENCOUNTER — OFFICE VISIT (OUTPATIENT)
Dept: DERMATOLOGY | Facility: IMAGING CENTER | Age: 70
End: 2020-03-10
Payer: MEDICARE

## 2020-03-10 DIAGNOSIS — L29.9 PRURITUS: ICD-10-CM

## 2020-03-10 DIAGNOSIS — L82.1 SEBORRHEIC KERATOSES: ICD-10-CM

## 2020-03-10 DIAGNOSIS — L57.0 ACTINIC KERATOSES: ICD-10-CM

## 2020-03-10 PROCEDURE — 99213 OFFICE O/P EST LOW 20 MIN: CPT | Performed by: DERMATOLOGY

## 2020-03-10 RX ORDER — FLUOCINONIDE TOPICAL SOLUTION USP, 0.05% 0.5 MG/ML
SOLUTION TOPICAL
Qty: 60 ML | Refills: 3 | Status: SHIPPED | OUTPATIENT
Start: 2020-03-10 | End: 2020-07-16

## 2020-03-10 NOTE — PROGRESS NOTES
"DERMATOLOGY NOTE  FOLLOW UP VISIT       Chief complaint: Itching       Hermelinda Mosley is a 69 y.o. female who presents for     Patient states itching of scalp and eyebrows and upper back present for years. Feels like things crawling/pins and needles, \"sometimes electric\". No rash. Also notes painful and burning spot on top of scalp.    History of skin cancer: No  History of biopsies:Yes, Details:  Yes   History of blistering/severe sunburns:Yes, Details: during youth  Family history of skin cancer:No  Family history of atypical moles: daughter has had a few      Past Medical History:   Diagnosis Date   • Abdominal pain, unspecified site    • Alpha 1-antitrypsin PiMS phenotype    • Back pain    • Bronchitis    • Chickenpox    • Degeneration of lumbar or lumbosacral intervertebral disc    • Diarrhea    • DVT (deep venous thrombosis) (HCC)    • Esophageal reflux    • [place] measles    • Hernia of unspecified site of abdominal cavity without mention of obstruction or gangrene repaired   • History of colonoscopy 2005=normal    Dr. [facility]   • Influenza    • Irritable bowel syndrome    • Mumps    • Pap smear due   • Pneumonia    • Recurrent UTI 9/4/2009   • Restless leg syndrome    • Screening mammogram 10/30/2008=normal   • Superficial thrombophlebitis of left leg 10/20/2017   • Swelling 9/4/2009   • Tonsillitis    • Unspecified hemorrhoids without mention of complication    • Vein, varicose stripped        Family History   Problem Relation Age of Onset   • Hypertension Mother    • Diabetes Mother         pre diabetes   • Diabetes Father    • Hypertension Father    • Kidney Disease Father    • Respiratory Disease Brother         Social History     Socioeconomic History   • Marital status:      Spouse name: Not on file   • Number of children: Not on file   • Years of education: Not on file   • Highest education level: Not on file   Occupational History   • Not on file   Social Needs   • " Financial resource strain: Not on file   • Food insecurity     Worry: Not on file     Inability: Not on file   • Transportation needs     Medical: Not on file     Non-medical: Not on file   Tobacco Use   • Smoking status: Former Smoker     Packs/day: 0.25     Years: 4.00     Pack years: 1.00     Types: Cigarettes     Last attempt to quit: 1969     Years since quittin.2   • Smokeless tobacco: Never Used   • Tobacco comment: only smokes 1-2 cigs a couple times a week for 1-2 years   Substance and Sexual Activity   • Alcohol use: Yes     Alcohol/week: 0.0 - 1.2 oz     Comment: occasional   • Drug use: No   • Sexual activity: Yes     Partners: Male     Comment:    Lifestyle   • Physical activity     Days per week: Not on file     Minutes per session: Not on file   • Stress: Not on file   Relationships   • Social connections     Talks on phone: Not on file     Gets together: Not on file     Attends Holiness service: Not on file     Active member of club or organization: Not on file     Attends meetings of clubs or organizations: Not on file     Relationship status: Not on file   • Intimate partner violence     Fear of current or ex partner: Not on file     Emotionally abused: Not on file     Physically abused: Not on file     Forced sexual activity: Not on file   Other Topics Concern   • Not on file   Social History Narrative   • Not on file        No Known Allergies     MEDICATIONS:  Medications relevant to specialty reviewed.    Current Outpatient Medications:   •  rivaroxaban (XARELTO) 10 MG Tab tablet, Take 1 Tab by mouth with dinner., Disp: 90 Tab, Rfl: 3  •  calcium acetate (PHOS-LO) 667 MG Cap, Take 1,334 mg by mouth 3 times a day, with meals., Disp: , Rfl:   •  Lifitegrast (XIIDRA) 5 % Solution, 1 Drop by Ophthalmic route 2 Times a Day., Disp: , Rfl:   •  Multiple Vitamins-Minerals (MULTIVITAMIN PO), Take  by mouth., Disp: , Rfl:   •  Cholecalciferol (VITAMIN D PO), Take  by mouth., Disp: , Rfl:    •  Probiotic Product (PROBIOTIC DAILY PO), Take  by mouth., Disp: , Rfl:   •  Ascorbic Acid (VITAMIN C PO), Take 500 mg by mouth every day., Disp: , Rfl:   •  omeprazole (PRILOSEC) 20 MG delayed-release capsule, Take 20 mg by mouth every day., Disp: , Rfl:      REVIEW OF SYSTEMS:   Positive for fatigue, joint pain, visual disturbances and skin (see HPI)  Negative for fevers, chills and cough  All other systems reviewed and are negative.  Patient to address unrelated positives with PCP.    EXAM:  There were no vitals taken for this visit.  Constitutional: Well-developed, well-nourished, and in no distress.   HENT:   Head: normocephalic and atraumatic.  Right Ear: External ear normal.   Left Ear: External ear normal.   Nose: Nose normal.   Eyes: Conjunctivae and lids are normal.   Neck: Normal range of motion. Neck supple.   Pulmonary/Chest: Effort normal.   Neurological: Alert and oriented.    A focused cutaneous exam was completed including: scalp, hair, face, neck, chest and back  with the following pertinent findings listed below. Remaining above-listed examined areas within normal limits / negative for rashes or lesions.     - scalp crown in part with 2 pink gritty papules  - back with scattered brown-grey, waxy, hyperkeratotic papules   - no rash on scalp, eyebrows or back     IMPRESSION / PLAN:    1. Pruritus - favor paresthesias of skin (notalgia paresthetica) as patient has hx of degenerative disk disease vs component of pruritus 2/2 CKD vs other  - We reviewed dry skin care in detail, including short showers or baths with luke warm water, limited use of fragrance-free soap, generous use of bland fragrance-free emollients within 3 min of exiting the shower or bath, and fragrance free laundry products.  - Neg ROS for weight loss, fever, chills, blood in stool/urine  - Labs from 2/24 reviewed, significant for decreasing GFR  - recommend age appropriate cancer screening by PCP   - recommend Sarna prn itching  for eyebrows and back  - start capsaicin 0.025% cream for the BACK. Apply to affected area 5 times daily for 1 week, then 3 times daily for 4-6 weeks  - May try otc Zyrtec at night  - For scalp fluocinonide 0.05%  solution BID to affected areas of the body until the skin is smooth.   - We reviewed risks/benefits/expectations of treatment in detail, including side effects of long term topical steroid use (such as striae, atrophy and telangiectasias).  - pending response to above, consider gabapentin (discussed with patient today)     Return to clinic in: Return in about 3 months (around 6/10/2020). and as needed for any new or changing skin lesions.    Zahra Hagan M.D.

## 2020-03-12 DIAGNOSIS — R06.02 SOB (SHORTNESS OF BREATH): ICD-10-CM

## 2020-03-12 DIAGNOSIS — Z14.8 ALPHA-1-ANTITRYPSIN DEFICIENCY CARRIER: ICD-10-CM

## 2020-03-19 ENCOUNTER — APPOINTMENT (OUTPATIENT)
Dept: PULMONOLOGY | Facility: HOSPICE | Age: 70
End: 2020-03-19
Payer: MEDICARE

## 2020-06-15 DIAGNOSIS — Z79.01 LONG TERM (CURRENT) USE OF ANTICOAGULANTS: Primary | ICD-10-CM

## 2020-06-16 DIAGNOSIS — Z79.01 LONG TERM (CURRENT) USE OF ANTICOAGULANTS: ICD-10-CM

## 2020-06-22 ENCOUNTER — ANTICOAGULATION MONITORING (OUTPATIENT)
Dept: VASCULAR LAB | Facility: MEDICAL CENTER | Age: 70
End: 2020-06-22

## 2020-06-22 DIAGNOSIS — D68.51 FACTOR V LEIDEN MUTATION (HCC): ICD-10-CM

## 2020-06-22 DIAGNOSIS — Z79.01 LONG TERM (CURRENT) USE OF ANTICOAGULANTS: ICD-10-CM

## 2020-07-06 ENCOUNTER — ANTICOAGULATION MONITORING (OUTPATIENT)
Dept: VASCULAR LAB | Facility: MEDICAL CENTER | Age: 70
End: 2020-07-06

## 2020-07-06 DIAGNOSIS — Z79.01 LONG TERM (CURRENT) USE OF ANTICOAGULANTS: ICD-10-CM

## 2020-07-06 DIAGNOSIS — D68.51 FACTOR V LEIDEN MUTATION (HCC): ICD-10-CM

## 2020-07-06 NOTE — PROGRESS NOTES
Discharged from [facility].  Pt on low dose Xarelto, will defer all medical management to PCP.  Janay Fagan, Clinical Pharmacist, CDE, CACP

## 2020-07-16 ENCOUNTER — OFFICE VISIT (OUTPATIENT)
Dept: DERMATOLOGY | Facility: IMAGING CENTER | Age: 70
End: 2020-07-16
Payer: MEDICARE

## 2020-07-16 DIAGNOSIS — L21.9 SEBORRHEIC DERMATITIS: ICD-10-CM

## 2020-07-16 DIAGNOSIS — L29.9 PRURITUS: ICD-10-CM

## 2020-07-16 PROCEDURE — 99213 OFFICE O/P EST LOW 20 MIN: CPT | Performed by: DERMATOLOGY

## 2020-07-16 RX ORDER — GABAPENTIN 100 MG/1
100 CAPSULE ORAL EVERY EVENING
Qty: 90 CAP | Refills: 2 | Status: SHIPPED | OUTPATIENT
Start: 2020-07-16 | End: 2020-09-23

## 2020-07-16 RX ORDER — BETAMETHASONE DIPROPIONATE 0.05 %
GEL (GRAM) TOPICAL
Qty: 60 G | Refills: 3 | Status: SHIPPED | OUTPATIENT
Start: 2020-07-16 | End: 2021-05-06

## 2020-07-16 RX ORDER — TRIAMCINOLONE ACETONIDE 1 MG/G
CREAM TOPICAL
Qty: 454 G | Refills: 1 | Status: SHIPPED | OUTPATIENT
Start: 2020-07-16 | End: 2023-05-29

## 2020-07-16 NOTE — PROGRESS NOTES
"DERMATOLOGY NOTE  FOLLOW UP VISIT       Chief complaint: pruritus   Follow up management of pruritus of scalp and back, feels more like burning per patient. No rash on back., Sometimes scalp seems red. Scalp solution helps but only temporary. Sarna helps back temporarily but capsicin burned so she stopped it.    Initial hx:  Patient states itching of scalp and eyebrows and upper back present for years. Feels like things crawling/pins and needles, \"sometimes electric\". No rash. Also notes painful and burning spot on top of scalp.    History of skin cancer: No  History of biopsies:Yes, Details:  Yes   History of blistering/severe sunburns:Yes, Details: during youth  Family history of skin cancer:No  Family history of atypical moles: daughter has had a few      Past Medical History:   Diagnosis Date   • Abdominal pain, unspecified site    • Alpha 1-antitrypsin PiMS phenotype    • Back pain    • Bronchitis    • Chickenpox    • Degeneration of lumbar or lumbosacral intervertebral disc    • Diarrhea    • DVT (deep venous thrombosis) (HCC)    • Esophageal reflux    • [place] measles    • Hernia of unspecified site of abdominal cavity without mention of obstruction or gangrene repaired   • History of colonoscopy 2005=normal    Dr. [facility]> [facility]   • Influenza    • Irritable bowel syndrome    • Mumps    • Pap smear due   • Pneumonia    • Recurrent UTI 9/4/2009   • Restless leg syndrome    • Screening mammogram 10/30/2008=normal   • Superficial thrombophlebitis of left leg 10/20/2017   • Swelling 9/4/2009   • Tonsillitis    • Unspecified hemorrhoids without mention of complication    • Vein, varicose stripped        Family History   Problem Relation Age of Onset   • Hypertension Mother    • Diabetes Mother         pre diabetes   • Diabetes Father    • Hypertension Father    • Kidney Disease Father    • Respiratory Disease Brother         Social History     Socioeconomic History   • Marital status:      " Spouse name: Not on file   • Number of children: Not on file   • Years of education: Not on file   • Highest education level: Not on file   Occupational History   • Not on file   Social Needs   • Financial resource strain: Not on file   • Food insecurity     Worry: Not on file     Inability: Not on file   • Transportation needs     Medical: Not on file     Non-medical: Not on file   Tobacco Use   • Smoking status: Former Smoker     Packs/day: 0.25     Years: 4.00     Pack years: 1.00     Types: Cigarettes     Last attempt to quit: 1969     Years since quittin.5   • Smokeless tobacco: Never Used   • Tobacco comment: only smokes 1-2 cigs a couple times a week for 1-2 years   Substance and Sexual Activity   • Alcohol use: Yes     Alcohol/week: 0.0 - 1.2 oz     Comment: occasional   • Drug use: No   • Sexual activity: Yes     Partners: Male     Comment:    Lifestyle   • Physical activity     Days per week: Not on file     Minutes per session: Not on file   • Stress: Not on file   Relationships   • Social connections     Talks on phone: Not on file     Gets together: Not on file     Attends Scientologist service: Not on file     Active member of club or organization: Not on file     Attends meetings of clubs or organizations: Not on file     Relationship status: Not on file   • Intimate partner violence     Fear of current or ex partner: Not on file     Emotionally abused: Not on file     Physically abused: Not on file     Forced sexual activity: Not on file   Other Topics Concern   • Not on file   Social History Narrative   • Not on file        No Known Allergies     MEDICATIONS:  Medications relevant to specialty reviewed.    Current Outpatient Medications:   •  rivaroxaban (XARELTO) 10 MG Tab tablet, Take 1 Tab by mouth with dinner., Disp: 90 Tab, Rfl: 3  •  fluocinonide (LIDEX) 0.05 % external solution, Apply daily to affected areas of the scalp until the skin is smooth. Repeat as needed., Disp: 60 mL,  Rfl: 3  •  calcium acetate (PHOS-LO) 667 MG Cap, Take 1,334 mg by mouth 3 times a day, with meals., Disp: , Rfl:   •  Lifitegrast (XIIDRA) 5 % Solution, 1 Drop by Ophthalmic route 2 Times a Day., Disp: , Rfl:   •  Multiple Vitamins-Minerals (MULTIVITAMIN PO), Take  by mouth., Disp: , Rfl:   •  Cholecalciferol (VITAMIN D PO), Take  by mouth., Disp: , Rfl:   •  Probiotic Product (PROBIOTIC DAILY PO), Take  by mouth., Disp: , Rfl:   •  Ascorbic Acid (VITAMIN C PO), Take 500 mg by mouth every day., Disp: , Rfl:   •  omeprazole (PRILOSEC) 20 MG delayed-release capsule, Take 20 mg by mouth every day., Disp: , Rfl:      REVIEW OF SYSTEMS:   Positive for skin (see HPI)  Negative for fevers, chills, and cough    EXAM:  There were no vitals taken for this visit.  Constitutional: Well-developed, well-nourished, and in no distress.   HENT: Head normocephalic and atraumatic.   Neurological: Alert and oriented.    A focused skin exam was performed including the affected areas of the head (including face), neck and back. Notable findings on exam today listed below. Remaining above-listed examined areas within normal limits / negative for rashes or lesions.     - scalp with mild erythema and scaling (crown and vertex)  - no rash on back     IMPRESSION / PLAN:    1. Pruritus - favor paresthesias of skin (notalgia paresthetica) as patient has hx of degenerative disk disease vs component of pruritus 2/2 CKD vs other  - We reviewed dry skin care in detail, including short showers or baths with luke warm water, limited use of fragrance-free soap, generous use of bland fragrance-free emollients within 3 min of exiting the shower or bath, and fragrance free laundry products.  - Neg ROS for weight loss, fever, chills, blood in stool/urine  - Labs from 2/24 reviewed, significant for decreasing GFR  - continue age appropriate cancer screening by PCP   - start gabapentin 100 mg po before bed, increase to 200mg po qhs after 1 week if needed,  then may increase to 300 mg po qhs after another week if needed    For back  - continue Sarna prn itching for back  - start triamcinolone 0.1%  cream BID to affected areas of the body until the skin is smooth.     For scalp  - start betamethasone 0.05% gel BID to affected areas of the body until the skin is smooth.   - We reviewed risks/benefits/expectations of treatment in detail, including side effects of long term topical steroid use (such as striae, atrophy and telangiectasias).    - pending response to above, consider biopsy of scalp, chiropractor, trial antihistamines     Return to clinic in: Return 3 months. and as needed for any new or changing skin lesions.    Zahra Hagan M.D.

## 2020-07-17 ENCOUNTER — TELEPHONE (OUTPATIENT)
Dept: DERMATOLOGY | Facility: IMAGING CENTER | Age: 70
End: 2020-07-17

## 2020-07-17 NOTE — TELEPHONE ENCOUNTER
Patient called with questions about new Rx for Gabapentin.  Patient wanted to know if she was supposed to be on medication long-term, she has concerns about her kidney function.  Pharmacist warned her of stopping medication suddenly because of increased risk of seizure.  Advised patient to hold off starting medication and that I would send Dr. Hagan message to advise.  Patient agreeable.

## 2020-07-21 NOTE — TELEPHONE ENCOUNTER
Patient notified of instructions per Dr. Hagan.  Patient expressed understanding and had no further questions.

## 2020-08-04 ENCOUNTER — ANTICOAGULATION MONITORING (OUTPATIENT)
Dept: VASCULAR LAB | Facility: MEDICAL CENTER | Age: 70
End: 2020-08-04

## 2020-08-04 NOTE — PROGRESS NOTES
Spoke with Hermelinda, she has a EGD scheduled with digestive health [facility].  Based on her labs it is reasonable to hold Xarelto for 2 days prior to the procedure and resume 24 hours after the procedure.  Faxed clearance to digestive [facility].    Fernando Charles, PharmD, MS, BCACP, [facility]  Phone [phone] fax [phone]    This note was created using voice recognition software (Dragon). The accuracy of the dictation is limited by the abilities of the software. I have reviewed the note prior to signing, however some errors in grammar and context are still possible. If you have any questions related to this note please do not hesitate to contact our office.       Above instructions and documentation relayed to Jessica @ [facility] per their request.  Kevyn Mcdermott, AliciaD, BCACP

## 2020-09-14 ENCOUNTER — HOSPITAL ENCOUNTER (OUTPATIENT)
Dept: LAB | Facility: MEDICAL CENTER | Age: 70
End: 2020-09-14
Attending: FAMILY MEDICINE
Payer: MEDICARE

## 2020-09-14 DIAGNOSIS — N18.30 CHRONIC KIDNEY DISEASE, STAGE 3: ICD-10-CM

## 2020-09-14 LAB
ALBUMIN SERPL BCP-MCNC: 4.4 G/DL (ref 3.2–4.9)
BUN SERPL-MCNC: 14 MG/DL (ref 8–22)
CALCIUM SERPL-MCNC: 10 MG/DL (ref 8.5–10.5)
CHLORIDE SERPL-SCNC: 103 MMOL/L (ref 96–112)
CO2 SERPL-SCNC: 29 MMOL/L (ref 20–33)
CREAT SERPL-MCNC: 0.99 MG/DL (ref 0.5–1.4)
GLUCOSE SERPL-MCNC: 89 MG/DL (ref 65–99)
PHOSPHATE SERPL-MCNC: 3.5 MG/DL (ref 2.5–4.5)
POTASSIUM SERPL-SCNC: 4.4 MMOL/L (ref 3.6–5.5)
SODIUM SERPL-SCNC: 140 MMOL/L (ref 135–145)

## 2020-09-14 PROCEDURE — 80069 RENAL FUNCTION PANEL: CPT

## 2020-09-14 PROCEDURE — 36415 COLL VENOUS BLD VENIPUNCTURE: CPT

## 2020-09-15 ENCOUNTER — TELEPHONE (OUTPATIENT)
Dept: MEDICAL GROUP | Facility: PHYSICIAN GROUP | Age: 70
End: 2020-09-15

## 2020-09-15 NOTE — TELEPHONE ENCOUNTER
Please call pt to help reschedule 9/17/20 visit at 8 am which is in a personal time slot, with my apologies.   Thank you  Maribel Long M.D.

## 2020-09-16 ENCOUNTER — TELEPHONE (OUTPATIENT)
Dept: MEDICAL GROUP | Facility: PHYSICIAN GROUP | Age: 70
End: 2020-09-16

## 2020-09-17 ENCOUNTER — OFFICE VISIT (OUTPATIENT)
Dept: PULMONOLOGY | Facility: HOSPICE | Age: 70
End: 2020-09-17
Payer: MEDICARE

## 2020-09-17 ENCOUNTER — HOSPITAL ENCOUNTER (OUTPATIENT)
Dept: RADIOLOGY | Facility: MEDICAL CENTER | Age: 70
End: 2020-09-17
Attending: FAMILY MEDICINE
Payer: MEDICARE

## 2020-09-17 ENCOUNTER — NON-PROVIDER VISIT (OUTPATIENT)
Dept: PULMONOLOGY | Facility: HOSPICE | Age: 70
End: 2020-09-17
Payer: MEDICARE

## 2020-09-17 VITALS
HEART RATE: 64 BPM | HEIGHT: 62 IN | BODY MASS INDEX: 30.36 KG/M2 | DIASTOLIC BLOOD PRESSURE: 70 MMHG | SYSTOLIC BLOOD PRESSURE: 102 MMHG | RESPIRATION RATE: 16 BRPM | WEIGHT: 165 LBS | OXYGEN SATURATION: 94 %

## 2020-09-17 VITALS — BODY MASS INDEX: 30.36 KG/M2 | HEIGHT: 62 IN | WEIGHT: 165 LBS

## 2020-09-17 DIAGNOSIS — D68.51 FACTOR V LEIDEN MUTATION (HCC): ICD-10-CM

## 2020-09-17 DIAGNOSIS — R06.02 SOB (SHORTNESS OF BREATH): ICD-10-CM

## 2020-09-17 DIAGNOSIS — Z14.8 ALPHA-1-ANTITRYPSIN DEFICIENCY CARRIER: ICD-10-CM

## 2020-09-17 DIAGNOSIS — Z79.01 LONG TERM (CURRENT) USE OF ANTICOAGULANTS: ICD-10-CM

## 2020-09-17 DIAGNOSIS — Z12.31 VISIT FOR SCREENING MAMMOGRAM: ICD-10-CM

## 2020-09-17 DIAGNOSIS — E88.01 ALPHA-1-ANTITRYPSIN DEFICIENCY (HCC): ICD-10-CM

## 2020-09-17 PROCEDURE — 99214 OFFICE O/P EST MOD 30 MIN: CPT | Performed by: INTERNAL MEDICINE

## 2020-09-17 PROCEDURE — 77067 SCR MAMMO BI INCL CAD: CPT

## 2020-09-17 ASSESSMENT — PULMONARY FUNCTION TESTS
FVC_LLN: 2.33
FVC_PREDICTED: 2.79
FEV1/FVC_PERCENT_LLN: 63
FEV1_PERCENT_PREDICTED: 136
FEV1/FVC: 83
FEV1/FVC_PERCENT_CHANGE: 0
FEV1/FVC: 82
FEV1_PERCENT_CHANGE: 1
FVC: 3.45
FEV1: 2.89
FEV1/FVC: 84
FEV1: 2.88
FEV1_PERCENT_CHANGE: 0
FEV1/FVC_PERCENT_PREDICTED: 111
FEV1_PERCENT_PREDICTED: 137
FEV1/FVC_PERCENT_PREDICTED: 75
FEV1_LLN: 1.76
FVC: 3.52
FEV1/FVC_PREDICTED: 76
FEV1/FVC: 82.1
FEV1/FVC_PERCENT_PREDICTED: 110
FVC_PERCENT_PREDICTED: 126
FVC_PERCENT_PREDICTED: 123
FEV1/FVC_PERCENT_CHANGE: -1
FEV1/FVC_PERCENT_PREDICTED: 108
FEV1/FVC_PERCENT_PREDICTED: 109
FEV1_PREDICTED: 2.1

## 2020-09-17 ASSESSMENT — FIBROSIS 4 INDEX
FIB4 SCORE: 1.32
FIB4 SCORE: 1.32

## 2020-09-17 NOTE — PROGRESS NOTES
Chief Complaint   Patient presents with   • Follow-Up     Alpha-1-antitrypsin deficiency carrier , Last Seen 12/03/18   • Results     PFT 09/17/20     HPI: This patient is a 70 y.o. Female returns for annual pulmonary function testing.  She is an alpha-1 antitrypsin deficiency,  MZ phenotype.  She is a non-smoker and denies any respiratory symptomatology, specifically dyspnea, cough, wheezing. Her pulmonary function testing is normal.  Today's PFT show stable, supratherapeutic lung function with FEV1 2.89 L 137%.  She has a history of factor V Leiden deficiency with DVT and pulmonary emboli in 2017.  She is chronically on Xarelto.  She denies chest pain or lower extremity edema.  Chest CT April 2018 showed resolution of pulmonary emboli.  She has no specific complaints.      Past Medical History:   Diagnosis Date   • Abdominal pain, unspecified site    • Alpha 1-antitrypsin PiMS phenotype    • Back pain    • Bronchitis    • Chickenpox    • Degeneration of lumbar or lumbosacral intervertebral disc    • Diarrhea    • DVT (deep venous thrombosis) (HCC)    • Esophageal reflux    • [place] measles    • Hernia of unspecified site of abdominal cavity without mention of obstruction or gangrene repaired   • History of colonoscopy 2005=normal    Dr. Benjamin> [facility]   • Influenza    • Irritable bowel syndrome    • Mumps    • Pap smear due   • Pneumonia    • Recurrent UTI 9/4/2009   • Restless leg syndrome    • Screening mammogram 10/30/2008=normal   • Superficial thrombophlebitis of left leg 10/20/2017   • Swelling 9/4/2009   • Tonsillitis    • Unspecified hemorrhoids without mention of complication    • Vein, varicose stripped       Social History     Socioeconomic History   • Marital status:      Spouse name: Not on file   • Number of children: Not on file   • Years of education: Not on file   • Highest education level: Not on file   Occupational History   • Not on file   Social Needs   • Financial resource  strain: Not on file   • Food insecurity     Worry: Not on file     Inability: Not on file   • Transportation needs     Medical: Not on file     Non-medical: Not on file   Tobacco Use   • Smoking status: Former Smoker     Packs/day: 0.25     Years: 4.00     Pack years: 1.00     Types: Cigarettes     Quit date: 1969     Years since quittin.7   • Smokeless tobacco: Never Used   • Tobacco comment: only smokes 1-2 cigs a couple times a week for 1-2 years   Substance and Sexual Activity   • Alcohol use: Yes     Alcohol/week: 0.0 - 1.2 oz     Comment: occasional   • Drug use: No   • Sexual activity: Yes     Partners: Male     Comment:    Lifestyle   • Physical activity     Days per week: Not on file     Minutes per session: Not on file   • Stress: Not on file   Relationships   • Social connections     Talks on phone: Not on file     Gets together: Not on file     Attends Confucianism service: Not on file     Active member of club or organization: Not on file     Attends meetings of clubs or organizations: Not on file     Relationship status: Not on file   • Intimate partner violence     Fear of current or ex partner: Not on file     Emotionally abused: Not on file     Physically abused: Not on file     Forced sexual activity: Not on file   Other Topics Concern   • Not on file   Social History Narrative   • Not on file       Family History   Problem Relation Age of Onset   • Hypertension Mother    • Diabetes Mother         pre diabetes   • Diabetes Father    • Hypertension Father    • Kidney Disease Father    • Respiratory Disease Brother        Current Outpatient Medications on File Prior to Visit   Medication Sig Dispense Refill   • triamcinolone acetonide (KENALOG) 0.1 % Cream Apply thin layer 1-2 x daily to affected areas of the back for up to 4 weeks, the decrease to 3x per week 454 g 1   • gabapentin (NEURONTIN) 100 MG Cap Take 1 Cap by mouth every evening. May increase to 2 caps every evening after 1  "week if needed, then may increase to 3 caps every evening after another week if needed. 90 Cap 2   • betamethasone, augmented, (DIPROLENE) 0.05 % gel Apply daily to the affected area of the scalp until the scalp is smooth. Use up to 3 weeks, stop 1 week. Repeat prn 60 g 3   • rivaroxaban (XARELTO) 10 MG Tab tablet Take 1 Tab by mouth with dinner. 90 Tab 3   • calcium acetate (PHOS-LO) 667 MG Cap Take 1,334 mg by mouth 3 times a day, with meals.     • Lifitegrast (XIIDRA) 5 % Solution 1 Drop by Ophthalmic route 2 Times a Day.     • Multiple Vitamins-Minerals (MULTIVITAMIN PO) Take  by mouth.     • Cholecalciferol (VITAMIN D PO) Take  by mouth.     • Probiotic Product (PROBIOTIC DAILY PO) Take  by mouth.     • Ascorbic Acid (VITAMIN C PO) Take 500 mg by mouth every day.     • omeprazole (PRILOSEC) 20 MG delayed-release capsule Take 20 mg by mouth every day.       No current facility-administered medications on file prior to visit.        Allergies: Patient has no known allergies.    ROS:   Constitutional: Denies fevers, chills, night sweats, fatigue or weight loss  Eyes: Denies vision loss, pain, drainage, double vision  Ears, Nose, Throat: Denies earache, difficulty hearing, tinnitus, nasal congestion, hoarseness  Cardiovascular: Denies chest pain, tightness, palpitations, orthopnea or edema  Respiratory: Denies shortness of breath, cough, wheezing, hemoptysis  Sleep: Denies daytime sleepiness, snoring, apneas, insomnia, morning headaches  GI: Denies heartburn, dysphagia, nausea, abdominal pain, diarrhea or constipation  : Denies frequent urination, hematuria, discharge or painful urination  Musculoskeletal: Denies back pain, painful joints, sore muscles  Neurological: Denies weakness or headaches  Skin: No rashes    /70 (BP Location: Left arm, Patient Position: Sitting, BP Cuff Size: Adult)   Pulse 64   Resp 16   Ht 1.581 m (5' 2.25\")   Wt 74.8 kg (165 lb)   SpO2 94%     Physical Exam:  Appearance: " Well-nourished, well-developed, in no acute distress  HEENT: Normocephalic, atraumatic, white sclera, PERRLA, masked  Neck: No adenopathy or masses  Respiratory: no intercostal retractions or accessory muscle use  Lungs auscultation: Clear to auscultation bilaterally  Cardiovascular: Regular rate rhythm. No murmurs, rubs or gallops.  No LE edema  Abdomen: soft, nondistended  Gait: Normal  Digits: No clubbing, cyanosis  Motor: No focal deficits  Orientation: Oriented to time, person and place    Diagnosis:  1. Alpha-1-antitrypsin deficiency (HCC)  PULMONARY FUNCTION TESTS -Test requested: Complete Pulmonary Function Test   2. Factor V Leiden mutation ([facility])     3. Long term (current) use of anticoagulants [Z79.01]         Plan:  Hermelinda has alpha 1 antitrypsin MZ phenotype, with normal lung function.  She is asymptomatic and a lifelong non-smoker.     Recommend follow clinically and by annual screening pulmonary function testing.    Avoidance of smoke exposure advised.  Continue Xarelto for factor V Leiden deficiency.  Return in about 1 year (around 9/17/2021) for with PFT.

## 2020-09-17 NOTE — PROCEDURES
Tech: Becca Hercules, RRT  Tech notes: Good patient effort & cooperation.  The results of this test meet the ATS/ERS standards for acceptability & reproducibility.  Test was performed on the [employer] Body Plethysmograph-Elite DX system.  Predicted values were GLI- for spirometry, GLI-  for DLCO, ITS for Lung Volumes.  The DLCO was uncorrected for Hgb.  A bronchodilator of Ventolin HFA -2puffs via spacer administered.  DLCO performed during dilation period.    FVC is 3.52 L 126%, FEV1 is 2.89 L or 137%, FEV1/FVC: 82%.  T%.  DLCO: 140%.    Interpretation:  Normal pulmonary function.

## 2020-09-18 PROCEDURE — 94729 DIFFUSING CAPACITY: CPT | Performed by: INTERNAL MEDICINE

## 2020-09-18 PROCEDURE — 94726 PLETHYSMOGRAPHY LUNG VOLUMES: CPT | Performed by: INTERNAL MEDICINE

## 2020-09-18 PROCEDURE — 94060 EVALUATION OF WHEEZING: CPT | Performed by: INTERNAL MEDICINE

## 2020-09-23 ENCOUNTER — APPOINTMENT (OUTPATIENT)
Dept: RADIOLOGY | Facility: IMAGING CENTER | Age: 70
End: 2020-09-23
Attending: FAMILY MEDICINE
Payer: MEDICARE

## 2020-09-23 ENCOUNTER — OFFICE VISIT (OUTPATIENT)
Dept: MEDICAL GROUP | Facility: PHYSICIAN GROUP | Age: 70
End: 2020-09-23
Payer: MEDICARE

## 2020-09-23 VITALS
HEIGHT: 62 IN | OXYGEN SATURATION: 95 % | HEART RATE: 84 BPM | TEMPERATURE: 99 F | DIASTOLIC BLOOD PRESSURE: 84 MMHG | BODY MASS INDEX: 30.73 KG/M2 | SYSTOLIC BLOOD PRESSURE: 124 MMHG | RESPIRATION RATE: 16 BRPM | WEIGHT: 167 LBS

## 2020-09-23 DIAGNOSIS — M25.552 PAIN OF BOTH HIP JOINTS: ICD-10-CM

## 2020-09-23 DIAGNOSIS — G89.29 CHRONIC BILATERAL LOW BACK PAIN WITH RIGHT-SIDED SCIATICA: ICD-10-CM

## 2020-09-23 DIAGNOSIS — M25.551 BILATERAL HIP PAIN: ICD-10-CM

## 2020-09-23 DIAGNOSIS — E78.5 DYSLIPIDEMIA: ICD-10-CM

## 2020-09-23 DIAGNOSIS — M54.41 CHRONIC BILATERAL LOW BACK PAIN WITH RIGHT-SIDED SCIATICA: ICD-10-CM

## 2020-09-23 DIAGNOSIS — M25.552 BILATERAL HIP PAIN: ICD-10-CM

## 2020-09-23 DIAGNOSIS — N18.30 CHRONIC KIDNEY DISEASE, STAGE 3: ICD-10-CM

## 2020-09-23 DIAGNOSIS — Z23 NEED FOR VACCINATION: ICD-10-CM

## 2020-09-23 DIAGNOSIS — M25.551 PAIN OF BOTH HIP JOINTS: ICD-10-CM

## 2020-09-23 PROCEDURE — 73521 X-RAY EXAM HIPS BI 2 VIEWS: CPT | Mod: TC,FY | Performed by: FAMILY MEDICINE

## 2020-09-23 PROCEDURE — 90662 IIV NO PRSV INCREASED AG IM: CPT | Performed by: FAMILY MEDICINE

## 2020-09-23 PROCEDURE — 99214 OFFICE O/P EST MOD 30 MIN: CPT | Mod: 25 | Performed by: FAMILY MEDICINE

## 2020-09-23 PROCEDURE — G0008 ADMIN INFLUENZA VIRUS VAC: HCPCS | Performed by: FAMILY MEDICINE

## 2020-09-23 PROCEDURE — 72100 X-RAY EXAM L-S SPINE 2/3 VWS: CPT | Mod: TC,FY | Performed by: FAMILY MEDICINE

## 2020-09-23 ASSESSMENT — FIBROSIS 4 INDEX: FIB4 SCORE: 1.32

## 2020-09-23 NOTE — ASSESSMENT & PLAN NOTE
Chronic condition, pain in low back, both hips, and right leg  She has been trying to work more in her garden. Pain is worse at night when she is laying on her hips she feels she is constantly turning trying to get comfortable. Has RLS also, when she gets up it seem to feel better,   The gabapentin did not help.

## 2020-10-01 NOTE — PROGRESS NOTES
Subjective:   Hermelinda Mosley is a 70 y.o. female here today for evaluation and management of:     Chronic kidney disease, stage 3  Improving, GFR improved to 55   Encouraged to stay well hydrated, low salt diet and avoid NSAIDS    Back pain  Chronic condition, pain in low back, both hips, and right leg  She has been trying to work more in her garden. Pain is worse at night when she is laying on her hips she feels she is constantly turning trying to get comfortable. Has RLS also, when she gets up it seem to feel better,   The gabapentin did not help.          Current medicines (including changes today)  Current Outpatient Medications   Medication Sig Dispense Refill   • triamcinolone acetonide (KENALOG) 0.1 % Cream Apply thin layer 1-2 x daily to affected areas of the back for up to 4 weeks, the decrease to 3x per week 454 g 1   • betamethasone, augmented, (DIPROLENE) 0.05 % gel Apply daily to the affected area of the scalp until the scalp is smooth. Use up to 3 weeks, stop 1 week. Repeat prn 60 g 3   • rivaroxaban (XARELTO) 10 MG Tab tablet Take 1 Tab by mouth with dinner. 90 Tab 3   • calcium acetate (PHOS-LO) 667 MG Cap Take 1,334 mg by mouth 3 times a day, with meals.     • Lifitegrast (XIIDRA) 5 % Solution 1 Drop by Ophthalmic route 2 Times a Day.     • Multiple Vitamins-Minerals (MULTIVITAMIN PO) Take  by mouth.     • Cholecalciferol (VITAMIN D PO) Take  by mouth.     • Ascorbic Acid (VITAMIN C PO) Take 500 mg by mouth every day.     • omeprazole (PRILOSEC) 20 MG delayed-release capsule Take 20 mg by mouth every day.       No current facility-administered medications for this visit.      She  has a past medical history of Abdominal pain, unspecified site, Alpha 1-antitrypsin PiMS phenotype, Back pain, Bronchitis, Chickenpox, Degeneration of lumbar or lumbosacral intervertebral disc, Diarrhea, DVT (deep venous thrombosis) (HCC), Esophageal reflux, [place] measles, Hernia of unspecified site of abdominal  "cavity without mention of obstruction or gangrene (repaired), History of colonoscopy (2005=normal), Influenza, Irritable bowel syndrome, Mumps, Pap smear (due), Pneumonia, Recurrent UTI (9/4/2009), Restless leg syndrome, Screening mammogram (10/30/2008=normal), Superficial thrombophlebitis of left leg (10/20/2017), Swelling (9/4/2009), Tonsillitis, Unspecified hemorrhoids without mention of complication, and Vein, varicose (stripped). She also has no past medical history of Encounter for long-term (current) use of other medications.    ROS  No chest pain, no shortness of breath, no abdominal pain       Objective:     /84   Pulse 84   Temp 37.2 °C (99 °F) (Temporal)   Resp 16   Ht 1.575 m (5' 2\")   Wt 75.8 kg (167 lb)   SpO2 95%  Body mass index is 30.54 kg/m².   Physical Exam:  Constitutional: Alert, no distress.  Skin: Warm, dry, good turgor, no rashes in visible areas.  Eye: Equal, round and reactive, conjunctiva clear, lids normal.  Neck: Trachea midline  Respiratory: Unlabored respiratory effort  Psych: Alert and oriented x3, normal affect and mood.        Assessment and Plan:   The following treatment plan was discussed    1. Need for vaccination  - INFLUENZA VACCINE, HIGH DOSE (65+ ONLY)    2. Chronic kidney disease, stage 3 (HCC)  - Comp Metabolic Panel; Future    3. Chronic bilateral low back pain with right-sided sciatica  - DX-LUMBAR SPINE-2 OR 3 VIEWS; Future    4. Bilateral hip pain  - DX-HIP-BILATERAL-WITH PELVIS-2 VIEWS; Future    5. Pain of both hip joints  - RHEUMATOID ARTHRITIS FACTOR; Future  - CCP ANTIBODY; Future    6. Dyslipidemia  - Lipid Profile; Future  - Comp Metabolic Panel; Future      Followup: Return in about 3 months (around 12/23/2020) for arthralgia, joint swelling, hip  and low back pain.         "

## 2020-10-06 ENCOUNTER — PATIENT MESSAGE (OUTPATIENT)
Dept: MEDICAL GROUP | Facility: PHYSICIAN GROUP | Age: 70
End: 2020-10-06

## 2020-10-12 DIAGNOSIS — G89.29 CHRONIC BILATERAL LOW BACK PAIN WITH RIGHT-SIDED SCIATICA: ICD-10-CM

## 2020-10-12 DIAGNOSIS — M25.551 BILATERAL HIP PAIN: ICD-10-CM

## 2020-10-12 DIAGNOSIS — M54.41 CHRONIC BILATERAL LOW BACK PAIN WITH RIGHT-SIDED SCIATICA: ICD-10-CM

## 2020-10-12 DIAGNOSIS — M25.552 BILATERAL HIP PAIN: ICD-10-CM

## 2020-10-19 ENCOUNTER — OFFICE VISIT (OUTPATIENT)
Dept: DERMATOLOGY | Facility: IMAGING CENTER | Age: 70
End: 2020-10-19
Payer: MEDICARE

## 2020-10-19 DIAGNOSIS — R21 RASH AND NONSPECIFIC SKIN ERUPTION: ICD-10-CM

## 2020-10-19 DIAGNOSIS — L29.9 PRURITUS: ICD-10-CM

## 2020-10-19 PROCEDURE — 99213 OFFICE O/P EST LOW 20 MIN: CPT | Mod: 25 | Performed by: DERMATOLOGY

## 2020-10-19 PROCEDURE — 11104 PUNCH BX SKIN SINGLE LESION: CPT | Performed by: DERMATOLOGY

## 2020-10-19 NOTE — PROGRESS NOTES
"DERMATOLOGY NOTE  FOLLOW UP VISIT       Chief complaint: pruritus/burning  Follow up management of pruritus/burning of scalp and back. No rash on back, feels like scalp gets red. Back has improved with triamcinolone cream however scalp is still \"burning\" - betamethasone gel helps temporarily.      Initial hx:  Patient states itching of scalp and eyebrows and upper back present for years. Feels like things crawling/pins and needles, \"sometimes electric\". No rash. Also notes painful and burning spot on top of scalp.    History of skin cancer: No  History of biopsies:Yes, Details:  Yes   History of blistering/severe sunburns:Yes, Details: during youth  Family history of skin cancer:No  Family history of atypical moles: daughter has had a few      Past Medical History:   Diagnosis Date   • Abdominal pain, unspecified site    • Alpha 1-antitrypsin PiMS phenotype    • Back pain    • Bronchitis    • Chickenpox    • Degeneration of lumbar or lumbosacral intervertebral disc    • Diarrhea    • DVT (deep venous thrombosis) (HCC)    • Esophageal reflux    • [place] measles    • Hernia of unspecified site of abdominal cavity without mention of obstruction or gangrene repaired   • History of colonoscopy 2005=normal    Dr. Benjamin> [facility]   • Influenza    • Irritable bowel syndrome    • Mumps    • Pap smear due   • Pneumonia    • Recurrent UTI 9/4/2009   • Restless leg syndrome    • Screening mammogram 10/30/2008=normal   • Superficial thrombophlebitis of left leg 10/20/2017   • Swelling 9/4/2009   • Tonsillitis    • Unspecified hemorrhoids without mention of complication    • Vein, varicose stripped        Family History   Problem Relation Age of Onset   • Hypertension Mother    • Diabetes Mother         pre diabetes   • Diabetes Father    • Hypertension Father    • Kidney Disease Father    • Respiratory Disease Brother         Social History     Socioeconomic History   • Marital status:      Spouse name: Not on " file   • Number of children: Not on file   • Years of education: Not on file   • Highest education level: Not on file   Occupational History   • Not on file   Social Needs   • Financial resource strain: Not on file   • Food insecurity     Worry: Not on file     Inability: Not on file   • Transportation needs     Medical: Not on file     Non-medical: Not on file   Tobacco Use   • Smoking status: Former Smoker     Packs/day: 0.25     Years: 4.00     Pack years: 1.00     Types: Cigarettes     Quit date: 1969     Years since quittin.8   • Smokeless tobacco: Never Used   • Tobacco comment: only smokes 1-2 cigs a couple times a week for 1-2 years   Substance and Sexual Activity   • Alcohol use: Yes     Alcohol/week: 0.0 - 1.2 oz     Comment: occasional   • Drug use: No   • Sexual activity: Yes     Partners: Male     Comment:    Lifestyle   • Physical activity     Days per week: Not on file     Minutes per session: Not on file   • Stress: Not on file   Relationships   • Social connections     Talks on phone: Not on file     Gets together: Not on file     Attends Religion service: Not on file     Active member of club or organization: Not on file     Attends meetings of clubs or organizations: Not on file     Relationship status: Not on file   • Intimate partner violence     Fear of current or ex partner: Not on file     Emotionally abused: Not on file     Physically abused: Not on file     Forced sexual activity: Not on file   Other Topics Concern   • Not on file   Social History Narrative   • Not on file        No Known Allergies     MEDICATIONS:  Medications relevant to specialty reviewed.    Current Outpatient Medications:   •  triamcinolone acetonide (KENALOG) 0.1 % Cream, Apply thin layer 1-2 x daily to affected areas of the back for up to 4 weeks, the decrease to 3x per week, Disp: 454 g, Rfl: 1  •  betamethasone, augmented, (DIPROLENE) 0.05 % gel, Apply daily to the affected area of the scalp until  the scalp is smooth. Use up to 3 weeks, stop 1 week. Repeat prn, Disp: 60 g, Rfl: 3  •  rivaroxaban (XARELTO) 10 MG Tab tablet, Take 1 Tab by mouth with dinner., Disp: 90 Tab, Rfl: 3  •  calcium acetate (PHOS-LO) 667 MG Cap, Take 1,334 mg by mouth 3 times a day, with meals., Disp: , Rfl:   •  Lifitegrast (XIIDRA) 5 % Solution, 1 Drop by Ophthalmic route 2 Times a Day., Disp: , Rfl:   •  Multiple Vitamins-Minerals (MULTIVITAMIN PO), Take  by mouth., Disp: , Rfl:   •  Cholecalciferol (VITAMIN D PO), Take  by mouth., Disp: , Rfl:   •  Ascorbic Acid (VITAMIN C PO), Take 500 mg by mouth every day., Disp: , Rfl:   •  omeprazole (PRILOSEC) 20 MG delayed-release capsule, Take 20 mg by mouth every day., Disp: , Rfl:      REVIEW OF SYSTEMS:   Positive for skin (see HPI)  Negative for fevers, chills, and cough    EXAM:  There were no vitals taken for this visit.  Constitutional: Well-developed, well-nourished, and in no distress.   HENT: Head normocephalic and atraumatic.   Neurological: Alert and oriented.    A focused skin exam was performed including the affected areas of the head (including face), neck and back. Notable findings on exam today listed below. Remaining above-listed examined areas within normal limits / negative for rashes or lesions.     - scalp with mild erythema and scaling (crown and vertex)  - no rash on back     IMPRESSION / PLAN:    1. Pruritus - favor scalp dysesthesia/paresthesias of skin (notalgia paresthetica) as patient has hx of degenerative disk disease vs component of pruritus 2/2 CKD vs other  - We reviewed dry skin care in detail, including short showers or baths with luke warm water, limited use of fragrance-free soap, generous use of bland fragrance-free emollients within 3 min of exiting the shower or bath, and fragrance free laundry products.  - Neg ROS for weight loss, fever, chills, blood in stool/urine  - Labs from 2/24 reviewed, significant for decreasing GFR  - continue age  appropriate cancer screening by PCP   - gabapentin made her feel drowsy so stopped    For back  - continue Sarna prn itching for back  - cont triamcinolone 0.1%  cream BID to affected areas of the body until the skin is smooth.   - cont Sarna prn    For scalp  - cont betamethasone 0.05% gel BID to affected areas of the body until the skin is smooth.   - We reviewed risks/benefits/expectations of treatment in detail, including side effects of long term topical steroid use (such as striae, atrophy and telangiectasias).    - Discussed option for biopsy to aid in diagnosis, patient in agreement  - Biopsy Procedure Note: Location: scalp  -Total specimens sent for pathology: 1  - Photo taken with patient permission (see media tab)  - R/O inflammatory process vs normal skin (scalp dysesthesia)  - Plan pending path     After informed, written consent regarding the risks of scar, bleeding infection, numbness/nerve damage and discoloration was obtained and site confirmation with the patient, the site was cleaned with Hibiclens and infiltrated with 1% Lidocaine with epinephrine anesthesia.  A 4.0 mm punch biopsy was performed and hemostasis was achieved with 4.0 prolene suture.  The patient tolerated the procedure without incident.  Topical vaseline and a wound dressing was applied.  Wound care instructions were verbally discussed and a handout provided.   The specimen was labeled and sent to pathology for evaluation.  Suture to be removed in 10-12 days.      Return to clinic in: Return pending pathology. and as needed for any new or changing skin lesions.    Zahra Hagan M.D.

## 2020-10-21 ENCOUNTER — TELEPHONE (OUTPATIENT)
Dept: DERMATOLOGY | Facility: IMAGING CENTER | Age: 70
End: 2020-10-21

## 2020-10-21 NOTE — TELEPHONE ENCOUNTER
Called patient. No answer, left voicemail for patient to call with questions. [employer] message sent with results/plan.    Pathology was consistent with subtle histologic features essentially those of normal skin. Suspect that patient's symptoms are paresthesias and not related to inflammatory process of skin. Scalp dysesthesia/burning scalp syndrome is characterized by burning pain/pruritus/numbness/tingling of scalp without primary skin lesions, usually found in middle aged to elderly women. Can be associated with stress/anxiety.    Recommend a trial of topical capsaicin 0.025% (can be bought over the counter), comes in gel or cream, may be able to find a solution. Apply topical capsaicin 0.025% to affected area 5 times daily for 1 week, then 3 times daily for 4-6 weeks. May expect to feel some burning after application with initial use, but tends to improve with continued use.    See media tab for path report (D-path).

## 2020-10-29 ENCOUNTER — APPOINTMENT (OUTPATIENT)
Dept: DERMATOLOGY | Facility: IMAGING CENTER | Age: 70
End: 2020-10-29
Payer: MEDICARE

## 2020-11-02 ENCOUNTER — TELEPHONE (OUTPATIENT)
Dept: VASCULAR LAB | Facility: MEDICAL CENTER | Age: 70
End: 2020-11-02

## 2020-11-02 NOTE — TELEPHONE ENCOUNTER
Patient called today to inquire about starting turmeric supplementation.  Explained that turmeric can have an anticoagulation effect on the blood and would be advisable not to start if not necessarily indicated.  If she does decide to start taking for antiinflammatory effects, suggested lowest possible dose and increased monitoring for bruising or bleeding.  She also has questions regarding a new rash on her scalp that is being treated by dermatology.  She is concerned that Xarelto may be causing it.  Explained that based on description given, likely not associated with Xarelto use and to continue to f/u with dermatology.  Kevyn Mcdermott, PharmD, BCACP

## 2021-01-05 ENCOUNTER — HOSPITAL ENCOUNTER (OUTPATIENT)
Dept: LAB | Facility: MEDICAL CENTER | Age: 71
End: 2021-01-05
Attending: FAMILY MEDICINE
Payer: MEDICARE

## 2021-01-05 DIAGNOSIS — M25.552 PAIN OF BOTH HIP JOINTS: ICD-10-CM

## 2021-01-05 DIAGNOSIS — E78.5 DYSLIPIDEMIA: ICD-10-CM

## 2021-01-05 DIAGNOSIS — M25.551 PAIN OF BOTH HIP JOINTS: ICD-10-CM

## 2021-01-05 DIAGNOSIS — N18.30 CHRONIC KIDNEY DISEASE, STAGE 3: ICD-10-CM

## 2021-01-05 LAB
ALBUMIN SERPL BCP-MCNC: 4.2 G/DL (ref 3.2–4.9)
ALBUMIN/GLOB SERPL: 1.6 G/DL
ALP SERPL-CCNC: 66 U/L (ref 30–99)
ALT SERPL-CCNC: 21 U/L (ref 2–50)
ANION GAP SERPL CALC-SCNC: 8 MMOL/L (ref 7–16)
AST SERPL-CCNC: 17 U/L (ref 12–45)
BILIRUB SERPL-MCNC: 0.4 MG/DL (ref 0.1–1.5)
BUN SERPL-MCNC: 16 MG/DL (ref 8–22)
CALCIUM SERPL-MCNC: 9.7 MG/DL (ref 8.5–10.5)
CHLORIDE SERPL-SCNC: 106 MMOL/L (ref 96–112)
CHOLEST SERPL-MCNC: 186 MG/DL (ref 100–199)
CO2 SERPL-SCNC: 28 MMOL/L (ref 20–33)
CREAT SERPL-MCNC: 1.05 MG/DL (ref 0.5–1.4)
FASTING STATUS PATIENT QL REPORTED: NORMAL
GLOBULIN SER CALC-MCNC: 2.6 G/DL (ref 1.9–3.5)
GLUCOSE SERPL-MCNC: 96 MG/DL (ref 65–99)
HDLC SERPL-MCNC: 57 MG/DL
LDLC SERPL CALC-MCNC: 110 MG/DL
POTASSIUM SERPL-SCNC: 4.3 MMOL/L (ref 3.6–5.5)
PROT SERPL-MCNC: 6.8 G/DL (ref 6–8.2)
RHEUMATOID FACT SER IA-ACNC: 12 IU/ML (ref 0–14)
SODIUM SERPL-SCNC: 142 MMOL/L (ref 135–145)
TRIGL SERPL-MCNC: 95 MG/DL (ref 0–149)

## 2021-01-05 PROCEDURE — 86431 RHEUMATOID FACTOR QUANT: CPT

## 2021-01-05 PROCEDURE — 86200 CCP ANTIBODY: CPT

## 2021-01-05 PROCEDURE — 36415 COLL VENOUS BLD VENIPUNCTURE: CPT

## 2021-01-05 PROCEDURE — 80061 LIPID PANEL: CPT

## 2021-01-05 PROCEDURE — 80053 COMPREHEN METABOLIC PANEL: CPT

## 2021-01-07 ENCOUNTER — OFFICE VISIT (OUTPATIENT)
Dept: MEDICAL GROUP | Facility: PHYSICIAN GROUP | Age: 71
End: 2021-01-07
Payer: MEDICARE

## 2021-01-07 VITALS
HEIGHT: 62 IN | DIASTOLIC BLOOD PRESSURE: 76 MMHG | OXYGEN SATURATION: 97 % | HEART RATE: 85 BPM | SYSTOLIC BLOOD PRESSURE: 112 MMHG | WEIGHT: 172 LBS | RESPIRATION RATE: 16 BRPM | TEMPERATURE: 99.3 F | BODY MASS INDEX: 31.65 KG/M2

## 2021-01-07 DIAGNOSIS — G89.29 CHRONIC BILATERAL LOW BACK PAIN WITH RIGHT-SIDED SCIATICA: ICD-10-CM

## 2021-01-07 DIAGNOSIS — R20.8 BURNING SENSATION: ICD-10-CM

## 2021-01-07 DIAGNOSIS — L65.9 HAIR LOSS: ICD-10-CM

## 2021-01-07 DIAGNOSIS — E78.5 DYSLIPIDEMIA: ICD-10-CM

## 2021-01-07 DIAGNOSIS — M25.551 HIP PAIN, BILATERAL: ICD-10-CM

## 2021-01-07 DIAGNOSIS — M25.552 HIP PAIN, BILATERAL: ICD-10-CM

## 2021-01-07 DIAGNOSIS — K22.70 BARRETT'S ESOPHAGUS WITHOUT DYSPLASIA: ICD-10-CM

## 2021-01-07 DIAGNOSIS — M54.41 CHRONIC BILATERAL LOW BACK PAIN WITH RIGHT-SIDED SCIATICA: ICD-10-CM

## 2021-01-07 LAB — CCP IGG SERPL-ACNC: 4 UNITS (ref 0–19)

## 2021-01-07 PROCEDURE — 99213 OFFICE O/P EST LOW 20 MIN: CPT | Performed by: FAMILY MEDICINE

## 2021-01-07 ASSESSMENT — ENCOUNTER SYMPTOMS
CARDIOVASCULAR NEGATIVE: 1
PSYCHIATRIC NEGATIVE: 1
CONSTITUTIONAL NEGATIVE: 1
EYES NEGATIVE: 1
BACK PAIN: 1
RESPIRATORY NEGATIVE: 1
GASTROINTESTINAL NEGATIVE: 1

## 2021-01-07 ASSESSMENT — FIBROSIS 4 INDEX: FIB4 SCORE: 0.98

## 2021-01-07 ASSESSMENT — PATIENT HEALTH QUESTIONNAIRE - PHQ9: CLINICAL INTERPRETATION OF PHQ2 SCORE: 0

## 2021-01-07 NOTE — ASSESSMENT & PLAN NOTE
Was on gabapentin but stopped recently, October, due to tiredness and headaches  Ears itching  Denies hairloss

## 2021-01-07 NOTE — PROGRESS NOTES
Subjective:   Hermelinda Mosely is a 70 y.o. female here today for evaluation and management of: lab review     No problem-specific Assessment & Plan notes found for this encounter.         Current medicines (including changes today)  Current Outpatient Medications   Medication Sig Dispense Refill   • triamcinolone acetonide (KENALOG) 0.1 % Cream Apply thin layer 1-2 x daily to affected areas of the back for up to 4 weeks, the decrease to 3x per week 454 g 1   • betamethasone, augmented, (DIPROLENE) 0.05 % gel Apply daily to the affected area of the scalp until the scalp is smooth. Use up to 3 weeks, stop 1 week. Repeat prn 60 g 3   • rivaroxaban (XARELTO) 10 MG Tab tablet Take 1 Tab by mouth with dinner. 90 Tab 3   • calcium acetate (PHOS-LO) 667 MG Cap Take 1,334 mg by mouth 3 times a day, with meals.     • Lifitegrast (XIIDRA) 5 % Solution 1 Drop by Ophthalmic route 2 Times a Day.     • Multiple Vitamins-Minerals (MULTIVITAMIN PO) Take  by mouth.     • Cholecalciferol (VITAMIN D PO) Take  by mouth.     • Ascorbic Acid (VITAMIN C PO) Take 500 mg by mouth every day.     • omeprazole (PRILOSEC) 20 MG delayed-release capsule Take 20 mg by mouth every day.       No current facility-administered medications for this visit.      She  has a past medical history of Abdominal pain, unspecified site, Alpha 1-antitrypsin PiMS phenotype, Back pain, Bronchitis, Chickenpox, Degeneration of lumbar or lumbosacral intervertebral disc, Diarrhea, DVT (deep venous thrombosis) (HCC), Esophageal reflux, [place] measles, Hernia of unspecified site of abdominal cavity without mention of obstruction or gangrene (repaired), History of colonoscopy (2005=normal), Influenza, Irritable bowel syndrome, Mumps, Pap smear (due), Pneumonia, Recurrent UTI (9/4/2009), Restless leg syndrome, Screening mammogram (10/30/2008=normal), Superficial thrombophlebitis of left leg (10/20/2017), Swelling (9/4/2009), Tonsillitis, Unspecified hemorrhoids without  "mention of complication, and Vein, varicose (stripped). She also has no past medical history of Encounter for long-term (current) use of other medications.    ROS  No chest pain, no shortness of breath, no abdominal pain       Objective:     /76   Pulse 85   Temp 37.4 °C (99.3 °F) (Temporal)   Resp 16   Ht 1.575 m (5' 2\")   Wt 78 kg (172 lb)   SpO2 97%  Body mass index is 31.46 kg/m².   Physical Exam:  Constitutional: Alert, no distress.  Skin: Warm, dry, good turgor, no rashes in visible areas.  Eye: Equal, round and reactive, conjunctiva clear, lids normal.  ENMT: Lips without lesions, good dentition, oropharynx clear.  Neck: Trachea midline, no masses, no thyromegaly. No cervical or supraclavicular lymphadenopathy  Respiratory: Unlabored respiratory effort, lungs clear to auscultation, no wheezes, no ronchi.  Cardiovascular: Normal S1, S2, no murmur, no edema.  Abdomen: Soft, non-tender, no masses, no hepatosplenomegaly.  Psych: Alert and oriented x3, normal affect and mood.        Assessment and Plan:   The following treatment plan was discussed    There are no diagnoses linked to this encounter.    Followup: No follow-ups on file.         "

## 2021-01-07 NOTE — ASSESSMENT & PLAN NOTE
States recent EGD where GI told her he did not see any Barretts.     Pt continues to take omeprazole daily

## 2021-01-07 NOTE — ASSESSMENT & PLAN NOTE
Chronic but continues    Referral was placed at last visit.     Pt states hip pain has decreased and concern for getting COVID has made her want to stay home.

## 2021-01-07 NOTE — PROGRESS NOTES
Hermelinda Msoley is a 70 y.o. female presenting for continued burning sensation of scalp and discomfort to hips and low back when sleeping    Burning sensation  Prescribed elvail and gabapentin for the paraesthesias  States Gabapentin left her feeling tired in the morning as well has headache and has since stopped. Has not taken elvail for same reasons.   Using a shampoo from [employer] but not the recommended per derm, head and shoulders.  Still using hair products.    Back pain  Chronic but continues to use tylenol  Referral was placed at last visit for PT/OT which patient went to and has completed.  Was being seen by PT/OT but has been feeling uneasy about continuing due to COVID concerns.     Nuñez's esophagus without dysplasia  States recent EGD where GI told her he did not see any Barretts.   Pt continues to take omeprazole daily         Subjective:     Review of systems:    Review of Systems   Constitutional: Negative.    HENT: Negative.    Eyes: Negative.    Respiratory: Negative.    Cardiovascular: Negative.    Gastrointestinal: Negative.    Musculoskeletal: Positive for back pain and joint pain.        Bilateral hip pain while laying down   Skin: Positive for itching.        Burning sensation to scalp   Psychiatric/Behavioral: Negative.      See above.   Denies any symptoms unless previously indicated.        Current Outpatient Medications:   •  triamcinolone acetonide (KENALOG) 0.1 % Cream, Apply thin layer 1-2 x daily to affected areas of the back for up to 4 weeks, the decrease to 3x per week, Disp: 454 g, Rfl: 1  •  betamethasone, augmented, (DIPROLENE) 0.05 % gel, Apply daily to the affected area of the scalp until the scalp is smooth. Use up to 3 weeks, stop 1 week. Repeat prn, Disp: 60 g, Rfl: 3  •  rivaroxaban (XARELTO) 10 MG Tab tablet, Take 1 Tab by mouth with dinner., Disp: 90 Tab, Rfl: 3  •  calcium acetate (PHOS-LO) 667 MG Cap, Take 1,334 mg by mouth 3 times a day, with meals., Disp: , Rfl:  "  •  Lifitegrast (XIIDRA) 5 % Solution, 1 Drop by Ophthalmic route 2 Times a Day., Disp: , Rfl:   •  Multiple Vitamins-Minerals (MULTIVITAMIN PO), Take  by mouth., Disp: , Rfl:   •  Cholecalciferol (VITAMIN D PO), Take  by mouth., Disp: , Rfl:   •  Ascorbic Acid (VITAMIN C PO), Take 500 mg by mouth every day., Disp: , Rfl:   •  omeprazole (PRILOSEC) 20 MG delayed-release capsule, Take 20 mg by mouth every day., Disp: , Rfl:     Allergies, past medical history, past surgical history, family history, social history reviewed and updated    Objective:     Vitals: /76   Pulse 85   Temp 37.4 °C (99.3 °F) (Temporal)   Resp 16   Ht 1.575 m (5' 2\")   Wt 78 kg (172 lb)   SpO2 97%   BMI 31.46 kg/m²   General: Alert, pleasant, NAD  EYES:   PERRL, EOMI, no icterus or pallor.  Conjunctivae and lids normal.   HENT:  Normocephalic.  External ears normal.  No nasal drainage present.  Mucous membranes moist.  Neck supple.   No thyromegaly or masses palpated. No cervical or supraclavicular lymphadenopathy.  Heart: Regular rate and rhythm.  S1 and S2 normal.  No murmurs appreciated.  Respiratory: Normal respiratory effort.  Clear to auscultation bilaterally.  Abdomen:  Bowel sounds present.  Skin: Warm, dry, redness noted to part line on scalp, minimal abrasions noted at scalp line.  Musculoskeletal: Gait is normal.  Moves all extremities well.    Neurological: No tremors, sensation grossly intact,  gait is normal,   Psych:  Affect/mood is normal, judgement is good, memory is intact, grooming is appropriate.    Assessment/Plan:     Hermelinda was seen today for low back pain, hip pain and other.    Diagnoses and all orders for this visit:    Burning sensation   discussed with patient stopping all products and only using head and shoulders when washing hair. Will attempt to do this for two weeks and see if there is a noticeable change.   States Dermatology recommended topical capsaicin but feels that this is not working at this " time    Hair loss  Increase in hair loss, feels like more than normal. Denies large amounts or patches but can be seen in hair brush  -     TSH WITH REFLEX TO FT4; Future    Chronic bilateral low back pain with right-sided sciatica  Discusses alternating heat and cold.   demonstrated back exercises and stretches, states she has a book at home as well.    Hip pain, bilateral  Increased when sleeping or laying her side.   Was being seen by PT/OT but has been feeling uneasy about continuing due to COVID concerns.   Using a pillow between her knees at times which has been working for a bit but eventually it begins to hurt again    Dyslipidemia  -     Comp Metabolic Panel; Future  -     Lipid Profile; Future  Will follow-up in 6 months for labs.   LDL elevated.   Triglycerides have normalized.      Nuñez's esophagus without dysplasia  -     CBC WITH DIFFERENTIAL; Future  Was seen for EGD where patient was told that GI could not see the Barretts.   Patient continues to take omeprazole daily.        Return in about 6 months (around 7/7/2021), or or sooner if needed, labs to be completed prior to appointment.

## 2021-01-15 NOTE — RESULT ENCOUNTER NOTE
Hermelinda,  Your labs show mild elevation in cholesterol and slight decline in kidney function: stay well hydrated with water, avoid salt in foods and avoid NSAIDS.   All other labs look great!  Maribel Long M.D.

## 2021-03-24 DIAGNOSIS — Z79.01 LONG TERM (CURRENT) USE OF ANTICOAGULANTS: ICD-10-CM

## 2021-05-06 ENCOUNTER — OFFICE VISIT (OUTPATIENT)
Dept: MEDICAL GROUP | Facility: PHYSICIAN GROUP | Age: 71
End: 2021-05-06
Payer: MEDICARE

## 2021-05-06 VITALS
TEMPERATURE: 97.7 F | SYSTOLIC BLOOD PRESSURE: 110 MMHG | BODY MASS INDEX: 30.91 KG/M2 | HEART RATE: 79 BPM | HEIGHT: 62 IN | WEIGHT: 168 LBS | OXYGEN SATURATION: 95 % | DIASTOLIC BLOOD PRESSURE: 70 MMHG | RESPIRATION RATE: 12 BRPM

## 2021-05-06 DIAGNOSIS — K22.70 BARRETT'S ESOPHAGUS WITHOUT DYSPLASIA: ICD-10-CM

## 2021-05-06 DIAGNOSIS — M47.816 LUMBAR FACET ARTHROPATHY: ICD-10-CM

## 2021-05-06 DIAGNOSIS — M25.552 HIP PAIN, BILATERAL: ICD-10-CM

## 2021-05-06 DIAGNOSIS — M47.818 SI JOINT ARTHRITIS: ICD-10-CM

## 2021-05-06 DIAGNOSIS — G89.29 CHRONIC BILATERAL LOW BACK PAIN WITH RIGHT-SIDED SCIATICA: ICD-10-CM

## 2021-05-06 DIAGNOSIS — M54.41 CHRONIC BILATERAL LOW BACK PAIN WITH RIGHT-SIDED SCIATICA: ICD-10-CM

## 2021-05-06 DIAGNOSIS — M41.9 SCOLIOSIS OF LUMBAR SPINE, UNSPECIFIED SCOLIOSIS TYPE: ICD-10-CM

## 2021-05-06 DIAGNOSIS — M25.551 HIP PAIN, BILATERAL: ICD-10-CM

## 2021-05-06 DIAGNOSIS — L84 CORN OF FOOT: ICD-10-CM

## 2021-05-06 DIAGNOSIS — M15.9 PRIMARY OSTEOARTHRITIS INVOLVING MULTIPLE JOINTS: ICD-10-CM

## 2021-05-06 PROCEDURE — 11056 PARNG/CUTG B9 HYPRKR LES 2-4: CPT | Performed by: FAMILY MEDICINE

## 2021-05-06 PROCEDURE — 99214 OFFICE O/P EST MOD 30 MIN: CPT | Mod: 25 | Performed by: FAMILY MEDICINE

## 2021-05-06 ASSESSMENT — FIBROSIS 4 INDEX: FIB4 SCORE: 0.98

## 2021-05-06 NOTE — ASSESSMENT & PLAN NOTE
Patient has facet arthropathy seen on imaging of her lumbar spine  She also has arthritis of SI joints on x-ray  Patient had pain in diet provided referral to physical therapy and orthopedic.  Pain then resolved but now has returned and she has pain radiating down both her legs also.  Pain is worse with exertion.  Patient requests referral to [facility] [place] and referral was provided today.

## 2021-05-06 NOTE — PROGRESS NOTES
Subjective:   Hermelinda Mosley is a 70 y.o. female here today for evaluation and management of:     Corn of foot  Patient has 2 hard corns under her left foot.  These have been present for a few weeks but patient has been having increasing pain due to the hard corns.    Procedure note    Skin cleaned with alcohol swabs  #10 scalpel used to pare down both corns  Patient tolerated the procedure well.      Nuñez's esophagus without dysplasia  Patient continues on omeprazole.  She had an EGD done and was told no evidence of Nuñez's on the most recent EGD.  Patient reports phlegm and cough only in the mornings.  This could be due to silent acid reflux.  I advised her to continue on the omeprazole.    Primary osteoarthritis involving multiple joints  Patient has facet arthropathy seen on imaging of her lumbar spine  She also has arthritis of SI joints on x-ray  Patient had pain in diet provided referral to physical therapy and orthopedic.  Pain then resolved but now has returned and she has pain radiating down both her legs also.  Pain is worse with exertion.  Patient requests referral to [facility] [place] and referral was provided today.         Current medicines (including changes today)  Current Outpatient Medications   Medication Sig Dispense Refill   • rivaroxaban (XARELTO) 10 MG Tab tablet Take 1 tablet by mouth with dinner. 90 tablet 3   • triamcinolone acetonide (KENALOG) 0.1 % Cream Apply thin layer 1-2 x daily to affected areas of the back for up to 4 weeks, the decrease to 3x per week 454 g 1   • calcium acetate (PHOS-LO) 667 MG Cap Take 1,334 mg by mouth 3 times a day, with meals.     • Lifitegrast (XIIDRA) 5 % Solution 1 Drop by Ophthalmic route 2 Times a Day.     • Multiple Vitamins-Minerals (MULTIVITAMIN PO) Take  by mouth.     • Cholecalciferol (VITAMIN D PO) Take  by mouth.     • Ascorbic Acid (VITAMIN C PO) Take 500 mg by mouth every day.     • omeprazole (PRILOSEC) 20 MG delayed-release capsule Take 20  "mg by mouth every day.       No current facility-administered medications for this visit.     She  has a past medical history of Abdominal pain, unspecified site, Alpha 1-antitrypsin PiMS phenotype, Back pain, Bronchitis, Chickenpox, Degeneration of lumbar or lumbosacral intervertebral disc, Diarrhea, DVT (deep venous thrombosis) (HCC), Esophageal reflux, [place] measles, Hernia of unspecified site of abdominal cavity without mention of obstruction or gangrene (repaired), History of colonoscopy (2005=normal), Influenza, Irritable bowel syndrome, Mumps, Pap smear (due), Pneumonia, Recurrent UTI (9/4/2009), Restless leg syndrome, Screening mammogram (10/30/2008=normal), Superficial thrombophlebitis of left leg (10/20/2017), Swelling (9/4/2009), Tonsillitis, Unspecified hemorrhoids without mention of complication, and Vein, varicose (stripped). She also has no past medical history of Encounter for long-term (current) use of other medications.    ROS  No chest pain, no shortness of breath, no abdominal pain       Objective:     /70   Pulse 79   Temp 36.5 °C (97.7 °F) (Temporal)   Resp 12   Ht 1.575 m (5' 2\")   Wt 76.2 kg (168 lb)   SpO2 95%  Body mass index is 30.73 kg/m².   Physical Exam:  Constitutional: Alert, no distress.  Skin: Warm, dry, good turgor, no rashes in visible areas.  Eye: Equal, round and reactive, conjunctiva clear, lids normal.  ENMT: Lips without lesions, good dentition, oropharynx clear.  Neck: Trachea midline, no masses, no thyromegaly. No cervical or supraclavicular lymphadenopathy  Respiratory: Unlabored respiratory effort, lungs clear to auscultation, no wheezes, no ronchi.  Cardiovascular: Normal S1, S2, no murmur, no edema.  Abdomen: Soft, non-tender, no masses, no hepatosplenomegaly.  Psych: Alert and oriented x3, normal affect and mood.        Assessment and Plan:   The following treatment plan was discussed    1. Chronic bilateral low back pain with right-sided sciatica    - " REFERRAL TO ORTHOPEDICS    2. Hip pain, bilateral    - REFERRAL TO ORTHOPEDICS    3. SI joint arthritis    - REFERRAL TO ORTHOPEDICS    4. Lumbar facet arthropathy    - REFERRAL TO ORTHOPEDICS    5. Scoliosis of lumbar spine, unspecified scoliosis type    - REFERRAL TO ORTHOPEDICS    6. [place] of foot      7. Nuñez's esophagus without dysplasia      8. Primary osteoarthritis involving multiple joints        Followup: Return for as scheduled in July.

## 2021-05-06 NOTE — ASSESSMENT & PLAN NOTE
Patient has 2 hard corns under her left foot.  These have been present for a few weeks but patient has been having increasing pain due to the hard corns.    Procedure note    Skin cleaned with alcohol swabs  #10 scalpel used to pare down both corns  Patient tolerated the procedure well.

## 2021-05-06 NOTE — ASSESSMENT & PLAN NOTE
Patient continues on omeprazole.  She had an EGD done and was told no evidence of Nuñez's on the most recent EGD.  Patient reports phlegm and cough only in the mornings.  This could be due to silent acid reflux.  I advised her to continue on the omeprazole.

## 2021-08-04 ENCOUNTER — HOSPITAL ENCOUNTER (OUTPATIENT)
Dept: LAB | Facility: MEDICAL CENTER | Age: 71
End: 2021-08-04
Attending: FAMILY MEDICINE
Payer: MEDICARE

## 2021-08-04 DIAGNOSIS — E78.5 DYSLIPIDEMIA: ICD-10-CM

## 2021-08-04 DIAGNOSIS — M25.552 HIP PAIN, BILATERAL: ICD-10-CM

## 2021-08-04 DIAGNOSIS — M25.551 HIP PAIN, BILATERAL: ICD-10-CM

## 2021-08-04 DIAGNOSIS — K22.70 BARRETT'S ESOPHAGUS WITHOUT DYSPLASIA: ICD-10-CM

## 2021-08-04 DIAGNOSIS — L65.9 HAIR LOSS: ICD-10-CM

## 2021-08-04 LAB
BASOPHILS # BLD AUTO: 0.5 % (ref 0–1.8)
BASOPHILS # BLD: 0.02 K/UL (ref 0–0.12)
CHOLEST SERPL-MCNC: 172 MG/DL (ref 100–199)
EOSINOPHIL # BLD AUTO: 0.1 K/UL (ref 0–0.51)
EOSINOPHIL NFR BLD: 2.5 % (ref 0–6.9)
ERYTHROCYTE [DISTWIDTH] IN BLOOD BY AUTOMATED COUNT: 40.2 FL (ref 35.9–50)
FASTING STATUS PATIENT QL REPORTED: NORMAL
HCT VFR BLD AUTO: 48.2 % (ref 37–47)
HDLC SERPL-MCNC: 56 MG/DL
HGB BLD-MCNC: 16.2 G/DL (ref 12–16)
IMM GRANULOCYTES # BLD AUTO: 0.01 K/UL (ref 0–0.11)
IMM GRANULOCYTES NFR BLD AUTO: 0.2 % (ref 0–0.9)
LDLC SERPL CALC-MCNC: 88 MG/DL
LYMPHOCYTES # BLD AUTO: 1.31 K/UL (ref 1–4.8)
LYMPHOCYTES NFR BLD: 32.6 % (ref 22–41)
MCH RBC QN AUTO: 29.2 PG (ref 27–33)
MCHC RBC AUTO-ENTMCNC: 33.6 G/DL (ref 33.6–35)
MCV RBC AUTO: 87 FL (ref 81.4–97.8)
MONOCYTES # BLD AUTO: 0.38 K/UL (ref 0–0.85)
MONOCYTES NFR BLD AUTO: 9.5 % (ref 0–13.4)
NEUTROPHILS # BLD AUTO: 2.2 K/UL (ref 2–7.15)
NEUTROPHILS NFR BLD: 54.7 % (ref 44–72)
NRBC # BLD AUTO: 0 K/UL
NRBC BLD-RTO: 0 /100 WBC
PLATELET # BLD AUTO: 278 K/UL (ref 164–446)
PMV BLD AUTO: 10.9 FL (ref 9–12.9)
RBC # BLD AUTO: 5.54 M/UL (ref 4.2–5.4)
TRIGL SERPL-MCNC: 140 MG/DL (ref 0–149)
TSH SERPL DL<=0.005 MIU/L-ACNC: 2.57 UIU/ML (ref 0.38–5.33)
WBC # BLD AUTO: 4 K/UL (ref 4.8–10.8)

## 2021-08-04 PROCEDURE — 36415 COLL VENOUS BLD VENIPUNCTURE: CPT

## 2021-08-04 PROCEDURE — 80053 COMPREHEN METABOLIC PANEL: CPT

## 2021-08-04 PROCEDURE — 80061 LIPID PANEL: CPT

## 2021-08-04 PROCEDURE — 84443 ASSAY THYROID STIM HORMONE: CPT

## 2021-08-04 PROCEDURE — 85025 COMPLETE CBC W/AUTO DIFF WBC: CPT

## 2021-08-05 LAB
ALBUMIN SERPL BCP-MCNC: 4.2 G/DL (ref 3.2–4.9)
ALBUMIN/GLOB SERPL: 1.5 G/DL
ALP SERPL-CCNC: 68 U/L (ref 30–99)
ALT SERPL-CCNC: 18 U/L (ref 2–50)
ANION GAP SERPL CALC-SCNC: 10 MMOL/L (ref 7–16)
AST SERPL-CCNC: 18 U/L (ref 12–45)
BILIRUB SERPL-MCNC: 0.4 MG/DL (ref 0.1–1.5)
BUN SERPL-MCNC: 12 MG/DL (ref 8–22)
CALCIUM SERPL-MCNC: 9.8 MG/DL (ref 8.5–10.5)
CHLORIDE SERPL-SCNC: 107 MMOL/L (ref 96–112)
CO2 SERPL-SCNC: 24 MMOL/L (ref 20–33)
CREAT SERPL-MCNC: 0.97 MG/DL (ref 0.5–1.4)
GLOBULIN SER CALC-MCNC: 2.8 G/DL (ref 1.9–3.5)
GLUCOSE SERPL-MCNC: 94 MG/DL (ref 65–99)
POTASSIUM SERPL-SCNC: 4.5 MMOL/L (ref 3.6–5.5)
PROT SERPL-MCNC: 7 G/DL (ref 6–8.2)
SODIUM SERPL-SCNC: 141 MMOL/L (ref 135–145)

## 2021-08-16 ENCOUNTER — OFFICE VISIT (OUTPATIENT)
Dept: MEDICAL GROUP | Facility: PHYSICIAN GROUP | Age: 71
End: 2021-08-16
Payer: MEDICARE

## 2021-08-16 VITALS
SYSTOLIC BLOOD PRESSURE: 110 MMHG | TEMPERATURE: 98.1 F | WEIGHT: 163 LBS | HEIGHT: 62 IN | OXYGEN SATURATION: 96 % | RESPIRATION RATE: 16 BRPM | DIASTOLIC BLOOD PRESSURE: 68 MMHG | BODY MASS INDEX: 30 KG/M2 | HEART RATE: 72 BPM

## 2021-08-16 DIAGNOSIS — N18.31 STAGE 3A CHRONIC KIDNEY DISEASE: ICD-10-CM

## 2021-08-16 DIAGNOSIS — E55.9 VITAMIN D DEFICIENCY: ICD-10-CM

## 2021-08-16 DIAGNOSIS — Z12.31 ENCOUNTER FOR SCREENING MAMMOGRAM FOR BREAST CANCER: ICD-10-CM

## 2021-08-16 DIAGNOSIS — D68.51 FACTOR V LEIDEN MUTATION (HCC): ICD-10-CM

## 2021-08-16 DIAGNOSIS — M15.9 PRIMARY OSTEOARTHRITIS INVOLVING MULTIPLE JOINTS: ICD-10-CM

## 2021-08-16 PROCEDURE — 99214 OFFICE O/P EST MOD 30 MIN: CPT | Performed by: NURSE PRACTITIONER

## 2021-08-16 ASSESSMENT — FIBROSIS 4 INDEX: FIB4 SCORE: 1.08

## 2021-08-16 NOTE — PROGRESS NOTES
Chief Complaint   Patient presents with   • Lab Results       Subjective:     HPI:   Hermelinda Mosley is a 71 y.o. female here to discuss the evaluation and management of:        Chronic kidney disease, stage 3  Component      Latest Ref Rng & Units 2/24/2020 9/14/2020 1/5/2021 8/4/2021   GFR If Non African American      >60 mL/min/1.73 m 2 51 (A) 55 (A) 52 (A) 57 (A)     Reviewed all labs with patient and answered all questions.  GFR level continues to improve.      Vitamin D deficiency disease  This is a chronic condition.  Patient does report she takes daily diet vitamin D level.  Patient will be due for labs in January to check status.  Lab order was placed today.    Primary osteoarthritis involving multiple joints  This is a chronic and stable condition.  Patient reports that she takes Tylenol as needed for her osteoarthritis pain.  Today she reports her pain level is 0/10.  Patient was inquiring about her referral to orthopedics.  Information to orthopedic referral was given in discharge instructions.  No acute changes today.    Factor V Leiden mutation (HCC)  This is a chronic and stable condition.  Patient continues on Xarelto 10 mg tablet daily and is tolerating it well.  Patient is due for her annual pulmonology appointment.  Encourage patient to schedule this appointment as it will be due in September.  Patient was agreeable with this plan of care.          ROS:  Gen: no fevers/chills, no changes in weight  Pulm: no sob, no cough  CV: no chest pain, no palpitations  GI: no nausea/vomiting  : no dysuria  MSk: no myalgias  Skin: no rash  Heme/Lymph: no easy bruising    No Known Allergies    Current medicines (including changes today)  Current Outpatient Medications   Medication Sig Dispense Refill   • rivaroxaban (XARELTO) 10 MG Tab tablet Take 1 tablet by mouth with dinner. 90 tablet 3   • triamcinolone acetonide (KENALOG) 0.1 % Cream Apply thin layer 1-2 x daily to affected areas of the back for up to  4 weeks, the decrease to 3x per week 454 g 1   • calcium acetate (PHOS-LO) 667 MG Cap Take 1,334 mg by mouth 3 times a day, with meals.     • Lifitegrast (XIIDRA) 5 % Solution 1 Drop by Ophthalmic route 2 Times a Day.     • Multiple Vitamins-Minerals (MULTIVITAMIN PO) Take  by mouth.     • Cholecalciferol (VITAMIN D PO) Take  by mouth.     • Ascorbic Acid (VITAMIN C PO) Take 500 mg by mouth every day.     • omeprazole (PRILOSEC) 20 MG delayed-release capsule Take 20 mg by mouth every day.       No current facility-administered medications for this visit.       Social History     Tobacco Use   • Smoking status: Former Smoker     Packs/day: 0.25     Years: 4.00     Pack years: 1.00     Types: Cigarettes     Quit date: 1969     Years since quittin.6   • Smokeless tobacco: Never Used   • Tobacco comment: only smokes 1-2 cigs a couple times a week for 1-2 years   Vaping Use   • Vaping Use: Never used   Substance Use Topics   • Alcohol use: Yes     Alcohol/week: 0.0 - 1.2 oz   • Drug use: No       Patient Active Problem List    Diagnosis Date Noted   • Corn of foot 2021   • Burning sensation 2021   • Hip pain, bilateral 2021   • Back pain    • Alpha-1-antitrypsin deficiency (HCC) 2018   • Obesity (BMI 30-39.9) 2017   • Superficial thrombophlebitis of left leg 10/20/2017   • Vision changes 2017   • Long term (current) use of anticoagulants [Z79.01] 2017   • Chest pressure 2017   • Epigastric pressure 2017   • Nuñez's esophagus without dysplasia 10/17/2016   • Varicose veins of legs 10/17/2016   • Primary osteoarthritis involving multiple joints 2016   • Chronic kidney disease, stage 3 ([facility]) 2016   • Factor V Leiden mutation ([facility]) 2016   • Left ankle instability 2016   • Vitamin D deficiency disease 2016   • Cervical disc disease 2016   • Osteopenia 09/10/2013   • Other hemorrhoids 2009   • Esophageal reflux 2009    • Degeneration of lumbar or lumbosacral intervertebral disc 07/31/2009   • Irritable bowel syndrome 07/31/2009       Family History   Problem Relation Age of Onset   • Hypertension Mother    • Diabetes Mother         pre diabetes   • Diabetes Father    • Hypertension Father    • Kidney Disease Father    • Respiratory Disease Brother           Objective:     Hospital Outpatient Visit on 08/04/2021   Component Date Value Ref Range Status   • TSH 08/04/2021 2.570  0.380 - 5.330 uIU/mL Final    Comment: Please note new reference ranges effective 12/14/2017 10:00 AM    Pregnant Females, 1st Trimester  0.050-3.700  Pregnant Females, 2nd Trimester  0.310-4.350  Pregnant Females, 3rd Trimester  0.410-5.180     • Cholesterol,Tot 08/04/2021 172  100 - 199 mg/dL Final   • Triglycerides 08/04/2021 140  0 - 149 mg/dL Final   • HDL 08/04/2021 56  >=40 mg/dL Final   • LDL 08/04/2021 88  <100 mg/dL Final   • Sodium 08/04/2021 141  135 - 145 mmol/L Final   • Potassium 08/04/2021 4.5  3.6 - 5.5 mmol/L Final   • Chloride 08/04/2021 107  96 - 112 mmol/L Final   • Co2 08/04/2021 24  20 - 33 mmol/L Final   • Anion Gap 08/04/2021 10.0  7.0 - 16.0 Final   • Glucose 08/04/2021 94  65 - 99 mg/dL Final   • Bun 08/04/2021 12  8 - 22 mg/dL Final   • Creatinine 08/04/2021 0.97  0.50 - 1.40 mg/dL Final   • Calcium 08/04/2021 9.8  8.5 - 10.5 mg/dL Final   • AST(SGOT) 08/04/2021 18  12 - 45 U/L Final   • ALT(SGPT) 08/04/2021 18  2 - 50 U/L Final   • Alkaline Phosphatase 08/04/2021 68  30 - 99 U/L Final   • Total Bilirubin 08/04/2021 0.4  0.1 - 1.5 mg/dL Final   • Albumin 08/04/2021 4.2  3.2 - 4.9 g/dL Final   • Total Protein 08/04/2021 7.0  6.0 - 8.2 g/dL Final   • Globulin 08/04/2021 2.8  1.9 - 3.5 g/dL Final   • A-G Ratio 08/04/2021 1.5  g/dL Final   • WBC 08/04/2021 4.0* 4.8 - 10.8 K/uL Final   • RBC 08/04/2021 5.54* 4.20 - 5.40 M/uL Final   • Hemoglobin 08/04/2021 16.2* 12.0 - 16.0 g/dL Final   • Hematocrit 08/04/2021 48.2* 37.0 - 47.0 %  "Final   • MCV 08/04/2021 87.0  81.4 - 97.8 fL Final   • MCH 08/04/2021 29.2  27.0 - 33.0 pg Final   • MCHC 08/04/2021 33.6  33.6 - 35.0 g/dL Final   • RDW 08/04/2021 40.2  35.9 - 50.0 fL Final   • Platelet Count 08/04/2021 278  164 - 446 K/uL Final   • MPV 08/04/2021 10.9  9.0 - 12.9 fL Final   • Neutrophils-Polys 08/04/2021 54.70  44.00 - 72.00 % Final   • Lymphocytes 08/04/2021 32.60  22.00 - 41.00 % Final   • Monocytes 08/04/2021 9.50  0.00 - 13.40 % Final   • Eosinophils 08/04/2021 2.50  0.00 - 6.90 % Final   • Basophils 08/04/2021 0.50  0.00 - 1.80 % Final   • Immature Granulocytes 08/04/2021 0.20  0.00 - 0.90 % Final   • Nucleated RBC 08/04/2021 0.00  /100 WBC Final   • Neutrophils (Absolute) 08/04/2021 2.20  2.00 - 7.15 K/uL Final    Includes immature neutrophils, if present.   • Lymphs (Absolute) 08/04/2021 1.31  1.00 - 4.80 K/uL Final   • Monos (Absolute) 08/04/2021 0.38  0.00 - 0.85 K/uL Final   • Eos (Absolute) 08/04/2021 0.10  0.00 - 0.51 K/uL Final   • Baso (Absolute) 08/04/2021 0.02  0.00 - 0.12 K/uL Final   • Immature Granulocytes (abs) 08/04/2021 0.01  0.00 - 0.11 K/uL Final   • NRBC (Absolute) 08/04/2021 0.00  K/uL Final   • Fasting Status 08/04/2021 Fasting   Final   • GFR If  08/04/2021 >60  >60 mL/min/1.73 m 2 Final   • GFR If Non  08/04/2021 57* >60 mL/min/1.73 m 2 Final       /68   Pulse 72   Temp 36.7 °C (98.1 °F) (Temporal)   Resp 16   Ht 1.575 m (5' 2\")   Wt 73.9 kg (163 lb)   SpO2 96%  Body mass index is 29.81 kg/m².    Physical Exam:  Constitutional: Well-developed and well-nourished female in NAD. Not diaphoretic. No distress.   Skin: warm, dry, intact, no evidence of rash or concerning lesions  Neck: Supple, trachea midline. No thyromegaly present. No cervical or supraclavicular lymphadenopathy.  Cardiovascular: Regular rate and rhythm without murmur. Radial pulses are intact and equal bilaterally.  Pulmonary: Clear to ausculation. Normal " effort. No rales, ronchi, or wheezing.  Abdomen: Soft, non tender, and without distention. Active bowel sounds in all four quadrants. No rebound, guarding, masses   Extremities: No cyanosis, clubbing, erythema, nor edema.   Neurological: Alert and oriented x 3. No cranial nerve deficit. 5/5 myotomes. Sensation intact.   Psychiatric:  Behavior, mood, and affect are appropriate.       Assessment and Plan:     The following treatment plan was discussed:    Reviewed labs with patient and answered all questions.    1. Stage 3a chronic kidney disease (HCC)  This is a chronic and stable problem.  Labs reviewed with the patient.  Discussed avoidance of nephrotoxic drugs.  Discussed the importance of staying off of NSAIDs.  Tylenol use is appropriate for her arthritis pain continue to monitor.  - Renal Function Panel; Future  - CBC WITH DIFFERENTIAL; Future    2. Primary osteoarthritis involving multiple joints  This is a chronic and stable condition without exacerbation..  Referral information was given and discharge instructions for her to reach out to orthopedics if needed.  Patient will continue to use Tylenol as needed for pain relief.  Encouraged her to continue stretching exercises that were given to her by her physical therapist.    3. Factor V Leiden mutation (HCC)  Chronic condition.  Patient was encouraged to schedule her annual pulmonology appointment.  Patient will continue on Xarelto.    4. Vitamin D deficiency disease  - VITAMIN D,25 HYDROXY; Future    5. Encounter for screening mammogram for breast cancer  - MA-SCREENING MAMMO BILAT W/TOMOSYNTHESIS W/CAD; Future      Any change or worsening of signs or symptoms, patient encouraged to follow-up or report to emergency room for further evaluation. Patient verbalizes understanding and agrees.    Follow-Up: Return in about 6 months (around 2/16/2022) for Follow up labs, GFR level.      PLEASE NOTE: This dictation was created using voice recognition software. I  have made every reasonable attempt to correct obvious errors, but I expect that there are errors of grammar and possibly content that I did not discover before finalizing the note.

## 2021-08-16 NOTE — ASSESSMENT & PLAN NOTE
This is a chronic and stable condition.  Patient reports that she takes Tylenol as needed for her osteoarthritis pain.  Today she reports her pain level is 0/10.  Patient was inquiring about her referral to orthopedics.  Information to orthopedic referral was given in discharge instructions.  No acute changes today.

## 2021-08-16 NOTE — ASSESSMENT & PLAN NOTE
This is a chronic condition.  Patient does report she takes daily diet vitamin D level.  Patient will be due for labs in January to check status.  Lab order was placed today.

## 2021-08-16 NOTE — MR AVS SNAPSHOT
"        Hermelinda Mosley   3/3/2017 7:40 AM   Office Visit   MRN: [id number]    Department:  [facility]   Dept Phone:  [phone]    Description:  Female : 1950   Provider:  SADIE Claudio           Reason for Visit     Shortness of Breath feels like she has to take deep breaths X 1 week     GI Problem upper abdominal pain       Allergies as of 3/3/2017     No Known Allergies      You were diagnosed with     Epigastric pressure   [145692]       Vitamin D deficiency disease   [990292]       Chronic kidney disease, stage 3   [335643]         Vital Signs     Blood Pressure Pulse Temperature Respirations Height Weight    110/84 mmHg 85 36.3 °C (97.3 °F) 16 1.588 m (5' 2.52\") 71.668 kg (158 lb)    Body Mass Index Oxygen Saturation Smoking Status             28.42 kg/m2 95% Former Smoker         Basic Information     Date Of Birth Sex Race Ethnicity Preferred Language    1950 Female White Non- English      Problem List              ICD-10-CM Priority Class Noted - Resolved    Hemorrhoids K64.9   2009 - Present    Esophageal reflux K21.9   2009 - Present    Degeneration of lumbar or lumbosacral intervertebral disc M51.37   2009 - Present    Irritable bowel syndrome K58.9   2009 - Present    Swelling  ankles R60.9   2009 - Present    Osteopenia M85.80   9/10/2013 - Present    Left ankle instability M25.372   2016 - Present    Vitamin D deficiency disease E55.9   2016 - Present    Cervical disc disease M50.90   2016 - Present    Left ankle sprain S93.402A   2016 - Present    Primary osteoarthritis involving multiple joints M15.0   2016 - Present    Chronic kidney disease, stage 3 N18.3   2016 - Present    Factor V Leiden mutation (CMS-HCC) D68.51   2016 - Present    Nuñez's esophagus without dysplasia K22.70   10/17/2016 - Present    Varicose veins of legs I83.93   10/17/2016 - Present    Epigastric pressure R10.13   3/3/2017 - Present "      Health Maintenance        Date Due Completion Dates    IMM DTaP/Tdap/Td Vaccine (1 - Tdap) 6/16/1969 ---    IMM ZOSTER VACCINE 6/16/2010 ---    IMM PNEUMOCOCCAL 65+ (ADULT) LOW/MEDIUM RISK SERIES (2 of 2 - PPSV23) 1/6/2017 1/6/2016    MAMMOGRAM 1/29/2017 1/29/2016, 1/21/2016, 1/21/2016, 1/21/2016, 2/11/2013 (Done), 12/1/2009, 12/1/2009, 10/30/2008, 10/30/2008    Override on 2/11/2013: Done (Dr. Hansen; [place])    PAP SMEAR 1/6/2018 1/6/2015 (Done)    Override on 1/6/2015: Done    BONE DENSITY 1/29/2021 1/29/2016, 6/12/2013    COLONOSCOPY 6/14/2026 6/14/2016            Current Immunizations     13-VALENT PCV PREVNAR 1/6/2016    Influenza TIV (IM) 9/17/2016      Below and/or attached are the medications your provider expects you to take. Review all of your home medications and newly ordered medications with your provider and/or pharmacist. Follow medication instructions as directed by your provider and/or pharmacist. Please keep your medication list with you and share with your provider. Update the information when medications are discontinued, doses are changed, or new medications (including over-the-counter products) are added; and carry medication information at all times in the event of emergency situations     Allergies:  No Known Allergies          Medications  Valid as of: March 03, 2017 -  8:05 AM    Generic Name Brand Name Tablet Size Instructions for use    Cyclobenzaprine HCl (Tab) FLEXERIL 10 MG Take 1 Tab by mouth at bedtime as needed for Mild Pain.        Cyclobenzaprine HCl (Tab) FLEXERIL 5 MG Take 1-2 Tabs by mouth 3 times a day as needed for Mild Pain.        Diclofenac Sodium (Gel) Diclofenac Sodium 1 % Apply 2gm in a thin layer to joint pain in hands up to 4 times per day if needed        Esomeprazole Magnesium (CAPSULE DELAYED RELEASE) NEXIUM 20 MG Take 1 Cap by mouth every morning before breakfast.        Hydrocodone-Acetaminophen (Tab) NORCO 5-325 MG Take 1 Tab by mouth every 6 hours  as needed.        Hydrocortisone (Cream) PROCTOZONE HC 2.5 % Apply to hemorrhoids twice per day        Hydrocortisone Acetate (Suppos) ANUSOL-HC 25 MG Insert 25 mg in rectum every 12 hours.        Hydrocortisone Acetate (Suppos) ANUSOL-HC 25 MG Insert 1 Suppository in rectum every 12 hours.        Naproxen (Tab) NAPROSYN 500 MG Take 1 Tab by mouth 2 times a day, with meals.        Naproxen Sodium   Take  by mouth.        Omeprazole (CAPSULE DELAYED RELEASE) PRILOSEC 20 MG Take 20 mg by mouth every day.        Pantoprazole Sodium (Tablet Delayed Response) PROTONIX 40 MG Take 40 mg by mouth 2 Times a Day.        Pneumococcal 13-Karina Conj Vacc (Suspension) PREVNAR 13  0.5 mL by Intramuscular route Once PRN for up to 1 dose.        TraMADol HCl (Tab) ULTRAM 50 MG Take 1 Tab by mouth every 8 hours as needed (moderate to severe pain).        .                 Medicines prescribed today were sent to:     [facility] [address] [place] [id number]    Phone: [phone] Fax: [phone]    Open 24 Hours?: No      Medication refill instructions:       If your prescription bottle indicates you have medication refills left, it is not necessary to call your provider’s office. Please contact your pharmacy and they will refill your medication.    If your prescription bottle indicates you do not have any refills left, you may request refills at any time through one of the following ways: The online [employer] system (except Urgent Care), by calling your provider’s office, or by asking your pharmacy to contact your provider’s office with a refill request. Medication refills are processed only during regular business hours and may not be available until the next business day. Your provider may request additional information or to have a follow-up visit with you prior to refilling your medication.   *Please Note: Medication refills are assigned a new Rx number when  refilled electronically. Your pharmacy may indicate that no refills were authorized even though a new prescription for the same medication is available at the pharmacy. Please request the medicine by name with the pharmacy before contacting your provider for a refill.        Your To Do List     Future Labs/Procedures Complete By Expires    COMP METABOLIC PANEL  As directed 3/4/2018    VITAMIN D,25 HYDROXY  As directed 3/3/2018      Other Notes About Your Plan     GYN-Dr Jade Michaels Status: Patient Declined         There are no Wet Read(s) to document.

## 2021-08-16 NOTE — PATIENT INSTRUCTIONS
[facility]  [address]  [place] [address]  Phone: [phone]  Fax: [phone]      MyPlate from USDA    MyPlate is an outline of a general healthy diet based on the 2010 Dietary Guidelines for Americans, from the U.S. Department of Agriculture (USDA). It sets guidelines for how much food you should eat from each food group based on your age, sex, and level of physical activity.  What are tips for following MyPlate?  To follow MyPlate recommendations:  · Eat a wide variety of fruits and vegetables, grains, and protein foods.  · Serve smaller portions and eat less food throughout the day.  · Limit portion sizes to avoid overeating.  · Enjoy your food.  · Get at least 150 minutes of exercise every week. This is about 30 minutes each day, 5 or more days per week.  It can be difficult to have every meal look like MyPlate. Think about MyPlate as eating guidelines for an entire day, rather than each individual meal.  Fruits and vegetables  · Make half of your plate fruits and vegetables.  · Eat many different colors of fruits and vegetables each day.  · For a 2,000 calorie daily food plan, eat:  ? 2½ cups of vegetables every day.  ? 2 cups of fruit every day.  · 1 cup is equal to:  ? 1 cup raw or cooked vegetables.  ? 1 cup raw fruit.  ? 1 medium-sized orange, apple, or banana.  ? 1 cup 100% fruit or vegetable juice.  ? 2 cups raw leafy greens, such as lettuce, spinach, or kale.  ? ½ cup dried fruit.  Grains  · One fourth of your plate should be grains.  · Make at least half of the grains you eat each day whole grains.  · For a 2,000 calorie daily food plan, eat 6 oz of grains every day.  · 1 oz is equal to:  ? 1 slice bread.  ? 1 cup cereal.  ? ½ cup cooked rice, cereal, or pasta.  Protein  · One fourth of your plate should be protein.  · Eat a wide variety of protein foods, including meat, poultry, fish, eggs, beans, nuts, and tofu.  · For a 2,000 calorie daily food plan, eat 5½ oz of protein every day.  · 1 oz  is equal to:  ? 1 oz meat, poultry, or fish.  ? ¼ cup cooked beans.  ? 1 egg.  ? ½ oz nuts or seeds.  ? 1 Tbsp peanut butter.  Dairy  · Drink fat-free or low-fat (1%) milk.  · Eat or drink dairy as a side to meals.  · For a 2,000 calorie daily food plan, eat or drink 3 cups of dairy every day.  · 1 cup is equal to:  ? 1 cup milk, yogurt, cottage cheese, or soy milk (soy beverage).  ? 2 oz processed cheese.  ? 1½ oz natural cheese.  Fats, oils, salt, and sugars  · Only small amounts of oils are recommended.  · Avoid foods that are high in calories and low in nutritional value (empty calories), like foods high in fat or added sugars.  · Choose foods that are low in salt (sodium). Choose foods that have less than 140 milligrams (mg) of sodium per serving.  · Drink water instead of sugary drinks. Drink enough water each day to keep your urine pale yellow.  Where to find support  · Work with your health care provider or a nutrition specialist (dietitian) to develop a customized eating plan that is right for you.  · Download an ajit (mobile application) to help you track your daily food intake.  Where to find more information  · Go to ChooseMyPlate.gov for more information.  · Learn more and log your daily food intake according to MyPlate using USDA's SuperTracker: www.supertracker.usda.gov  Summary  · MyPlate is a general guideline for healthy eating from the USDA. It is based on the 2010 Dietary Guidelines for Americans.  · In general, fruits and vegetables should take up ½ of your plate, grains should take up ¼ of your plate, and protein should take up ¼ of your plate.  This information is not intended to replace advice given to you by your health care provider. Make sure you discuss any questions you have with your health care provider.  Document Released: 01/06/2009 Document Revised: 01/19/2019 Document Reviewed: 03/19/2018  Elsevier Patient Education © 2020 Elsevier Inc.

## 2021-08-16 NOTE — ASSESSMENT & PLAN NOTE
This is a chronic and stable condition.  Patient continues on Xarelto 10 mg tablet daily and is tolerating it well.  Patient is due for her annual pulmonology appointment.  Encourage patient to schedule this appointment as it will be due in September.  Patient was agreeable with this plan of care.

## 2021-09-24 ENCOUNTER — OFFICE VISIT (OUTPATIENT)
Dept: DERMATOLOGY | Facility: IMAGING CENTER | Age: 71
End: 2021-09-24
Payer: MEDICARE

## 2021-09-24 DIAGNOSIS — L29.9 ITCHING: ICD-10-CM

## 2021-09-24 DIAGNOSIS — L21.9 SEBORRHEIC DERMATITIS: ICD-10-CM

## 2021-09-24 DIAGNOSIS — L57.0 ACTINIC KERATOSIS: ICD-10-CM

## 2021-09-24 DIAGNOSIS — R20.8 DYSESTHESIA: ICD-10-CM

## 2021-09-24 PROCEDURE — 99214 OFFICE O/P EST MOD 30 MIN: CPT | Mod: 25 | Performed by: DERMATOLOGY

## 2021-09-24 PROCEDURE — 17000 DESTRUCT PREMALG LESION: CPT | Performed by: DERMATOLOGY

## 2021-09-24 NOTE — PROGRESS NOTES
CC: skin lesion    Subjective: Previously seen patient here for management of pruritus/ burning of scalp. Previously treated with betamethasone 0.05% gel and states some relief. Had stopped using, then got hair /roots dyed, noted more involvement. Re-used with some improvement.     Reports hx of gabapentin few doses, one pill. Noted headache so stopped.     HPI/location: right hand, burning lesion  Time present: 1 week  Painful lesion: Yes  Itching lesion: Yes  Enlarging lesion: No  Anything make it better or worse? No    HPI/location: mole on back  Time present: 1 year  Painful lesion: No  Itching lesion: No  Enlarging lesion: Yes  Anything make it better or worse? No    History of skin cancer: No  History of biopsies:Yes, Details:  Yes   History of blistering/severe sunburns:Yes, Details: during youth  Family history of skin cancer:No  Family history of atypical moles: daughter has had a few    ROS: no fevers/chills. No itch.  No cough  DermPMH: no skin cancer/melanoma  No problem-specific Assessment & Plan notes found for this encounter.    Relevant PMH: NC  Social: former smoker    PE: Gen:WDWN female in NAD.  Skin: scalp - erythematous patch, superior scalp.  Minimal scaling.  No perifollicular erythema. Right hand - thin HK papule, scattered tan macules.  Waxy papule left back    Prior Path 10/2020: Subtle findings: sparse perivascular derm    A/P: Scalp dysesthesia: worsening, chronic  -reviewed dx/tx  -trial Sarna + menthol Qday-BID  -trial gabapentin 100-300mg QHS PRN, se reviewed (home supply)    seb derm/Itching, scalp:mild  -betamethasone topical BID-->PRN titrated to symptoms    AK>ISK, right hand:  -counseled on diagnosis and treatment, including risks/benefits/alternatives of cyrotherapy  -LN2 25 sec X 2 cycles X 1lesions  -f/u if persists at 1 month    SK, back:  -b/r      I have reviewed medications relevant to my specialty.

## 2021-09-27 ENCOUNTER — HOSPITAL ENCOUNTER (OUTPATIENT)
Dept: RADIOLOGY | Facility: MEDICAL CENTER | Age: 71
End: 2021-09-27
Attending: NURSE PRACTITIONER
Payer: MEDICARE

## 2021-09-27 DIAGNOSIS — Z12.31 ENCOUNTER FOR SCREENING MAMMOGRAM FOR BREAST CANCER: ICD-10-CM

## 2021-09-27 PROCEDURE — 77063 BREAST TOMOSYNTHESIS BI: CPT

## 2021-11-12 ENCOUNTER — APPOINTMENT (OUTPATIENT)
Dept: DERMATOLOGY | Facility: IMAGING CENTER | Age: 71
End: 2021-11-12
Payer: MEDICARE

## 2022-02-23 ENCOUNTER — HOSPITAL ENCOUNTER (OUTPATIENT)
Dept: LAB | Facility: MEDICAL CENTER | Age: 72
End: 2022-02-23
Attending: FAMILY MEDICINE
Payer: MEDICARE

## 2022-02-23 DIAGNOSIS — E55.9 VITAMIN D DEFICIENCY: ICD-10-CM

## 2022-02-23 DIAGNOSIS — N18.31 STAGE 3A CHRONIC KIDNEY DISEASE: ICD-10-CM

## 2022-02-23 LAB
25(OH)D3 SERPL-MCNC: 54 NG/ML (ref 30–100)
ALBUMIN SERPL BCP-MCNC: 4.5 G/DL (ref 3.2–4.9)
BASOPHILS # BLD AUTO: 0.5 % (ref 0–1.8)
BASOPHILS # BLD: 0.02 K/UL (ref 0–0.12)
BUN SERPL-MCNC: 12 MG/DL (ref 8–22)
CALCIUM SERPL-MCNC: 9.5 MG/DL (ref 8.5–10.5)
CHLORIDE SERPL-SCNC: 104 MMOL/L (ref 96–112)
CO2 SERPL-SCNC: 26 MMOL/L (ref 20–33)
CREAT SERPL-MCNC: 1.02 MG/DL (ref 0.5–1.4)
EOSINOPHIL # BLD AUTO: 0.09 K/UL (ref 0–0.51)
EOSINOPHIL NFR BLD: 2.2 % (ref 0–6.9)
ERYTHROCYTE [DISTWIDTH] IN BLOOD BY AUTOMATED COUNT: 39.6 FL (ref 35.9–50)
GLUCOSE SERPL-MCNC: 88 MG/DL (ref 65–99)
HCT VFR BLD AUTO: 49.4 % (ref 37–47)
HGB BLD-MCNC: 16.5 G/DL (ref 12–16)
IMM GRANULOCYTES # BLD AUTO: 0.01 K/UL (ref 0–0.11)
IMM GRANULOCYTES NFR BLD AUTO: 0.2 % (ref 0–0.9)
LYMPHOCYTES # BLD AUTO: 1.48 K/UL (ref 1–4.8)
LYMPHOCYTES NFR BLD: 35.4 % (ref 22–41)
MCH RBC QN AUTO: 29 PG (ref 27–33)
MCHC RBC AUTO-ENTMCNC: 33.4 G/DL (ref 33.6–35)
MCV RBC AUTO: 87 FL (ref 81.4–97.8)
MONOCYTES # BLD AUTO: 0.37 K/UL (ref 0–0.85)
MONOCYTES NFR BLD AUTO: 8.9 % (ref 0–13.4)
NEUTROPHILS # BLD AUTO: 2.21 K/UL (ref 2–7.15)
NEUTROPHILS NFR BLD: 52.8 % (ref 44–72)
NRBC # BLD AUTO: 0 K/UL
NRBC BLD-RTO: 0 /100 WBC
PHOSPHATE SERPL-MCNC: 3.1 MG/DL (ref 2.5–4.5)
PLATELET # BLD AUTO: 279 K/UL (ref 164–446)
PMV BLD AUTO: 10.3 FL (ref 9–12.9)
POTASSIUM SERPL-SCNC: 4.4 MMOL/L (ref 3.6–5.5)
RBC # BLD AUTO: 5.68 M/UL (ref 4.2–5.4)
SODIUM SERPL-SCNC: 140 MMOL/L (ref 135–145)
WBC # BLD AUTO: 4.2 K/UL (ref 4.8–10.8)

## 2022-02-23 PROCEDURE — 36415 COLL VENOUS BLD VENIPUNCTURE: CPT

## 2022-02-23 PROCEDURE — 80069 RENAL FUNCTION PANEL: CPT

## 2022-02-23 PROCEDURE — 85025 COMPLETE CBC W/AUTO DIFF WBC: CPT

## 2022-02-23 PROCEDURE — 82306 VITAMIN D 25 HYDROXY: CPT

## 2022-02-24 ENCOUNTER — OFFICE VISIT (OUTPATIENT)
Dept: MEDICAL GROUP | Facility: PHYSICIAN GROUP | Age: 72
End: 2022-02-24
Payer: MEDICARE

## 2022-02-24 VITALS
RESPIRATION RATE: 14 BRPM | BODY MASS INDEX: 31.1 KG/M2 | OXYGEN SATURATION: 96 % | SYSTOLIC BLOOD PRESSURE: 130 MMHG | DIASTOLIC BLOOD PRESSURE: 84 MMHG | HEIGHT: 62 IN | HEART RATE: 96 BPM | TEMPERATURE: 97.6 F | WEIGHT: 169 LBS

## 2022-02-24 DIAGNOSIS — M25.552 BILATERAL HIP PAIN: ICD-10-CM

## 2022-02-24 DIAGNOSIS — D68.51 FACTOR V LEIDEN MUTATION (HCC): ICD-10-CM

## 2022-02-24 DIAGNOSIS — R20.8 BURNING SENSATION: ICD-10-CM

## 2022-02-24 DIAGNOSIS — Z86.711 HISTORY OF PULMONARY EMBOLUS (PE): ICD-10-CM

## 2022-02-24 DIAGNOSIS — E88.01 ALPHA-1-ANTITRYPSIN DEFICIENCY (HCC): ICD-10-CM

## 2022-02-24 DIAGNOSIS — E78.5 DYSLIPIDEMIA: ICD-10-CM

## 2022-02-24 DIAGNOSIS — M25.551 BILATERAL HIP PAIN: ICD-10-CM

## 2022-02-24 DIAGNOSIS — F32.9 REACTIVE DEPRESSION (SITUATIONAL): ICD-10-CM

## 2022-02-24 DIAGNOSIS — N18.31 STAGE 3A CHRONIC KIDNEY DISEASE: ICD-10-CM

## 2022-02-24 PROCEDURE — 99214 OFFICE O/P EST MOD 30 MIN: CPT | Performed by: FAMILY MEDICINE

## 2022-02-24 RX ORDER — FLUOXETINE 10 MG/1
10 CAPSULE ORAL DAILY
Qty: 30 CAPSULE | Refills: 3 | Status: SHIPPED | OUTPATIENT
Start: 2022-02-24 | End: 2022-02-24

## 2022-02-24 RX ORDER — DULOXETIN HYDROCHLORIDE 30 MG/1
30 CAPSULE, DELAYED RELEASE ORAL DAILY
Qty: 30 CAPSULE | Refills: 3 | Status: SHIPPED | OUTPATIENT
Start: 2022-02-24 | End: 2022-05-25

## 2022-02-24 RX ORDER — HYDROXYZINE HYDROCHLORIDE 25 MG/1
TABLET, FILM COATED ORAL
Qty: 60 TABLET | Refills: 3 | Status: SHIPPED | OUTPATIENT
Start: 2022-02-24 | End: 2022-05-25

## 2022-02-24 ASSESSMENT — FIBROSIS 4 INDEX: FIB4 SCORE: 1.08

## 2022-02-24 NOTE — ASSESSMENT & PLAN NOTE
Situational depression due to her son having behavioral changes in her son due to dementia and he is currently in the hospital in restraints. Her daughter in law is also on the verge of a breakdown and had bruising due to his behavioral changes.   Patient is having a hard time and stressed and would like treatment for depression and referral to psychology.   Discussed and provided rx for cymbalta

## 2022-02-24 NOTE — ASSESSMENT & PLAN NOTE
Bilateral hip pain, xrays show arthritis if hips and back.   Avoid NSAIDS due to xarelto for hx of DVTs and PE  Can use tylenol arthritis, ice, exercises.   Can refer in future to PT  Encouraged regular exercise, recent stress also, stress can make pain worse.

## 2022-02-24 NOTE — ASSESSMENT & PLAN NOTE
Relatively stable,   Repeat labs ordered  She is on xarelto, avoids NSAIdS  encouraged to stay well hydrated.

## 2022-02-24 NOTE — ASSESSMENT & PLAN NOTE
Patient continues to have a burning sensation of her head, she has been evaluated by dermatology, has been trying to avoid hair dyes, also tried betamethasone, flucinonide, gabapentin, did not like how gabapentin felt, tried capsaicin cream, they help temporarily but the pain always recurs. It is daily. They also did a biopsy which was benign  Hurts with pressure to her scalp. Sometimes worse than others.   On exam: mild erythema, burning and itching sensation to touch.   She also has itching sensation of her whole body at night at times. Allergy pills have helped at times, she's not sure if they really help with the itching or she falls asleep as they make her drowsy.     rx for hydroxyzine provided. rx for cymbalta also provided as she also has depression, chronic pain of hips.   Tsh, ccp, RF, hep C are all normal

## 2022-02-24 NOTE — PROGRESS NOTES
Subjective:   Hermelinda Mosley is a 71 y.o. female here today for evaluation and management of:     Burning sensation  Patient continues to have a burning sensation of her head, she has been evaluated by dermatology, has been trying to avoid hair dyes, also tried betamethasone, flucinonide, gabapentin, did not like how gabapentin felt, tried capsaicin cream, they help temporarily but the pain always recurs. It is daily. They also did a biopsy which was benign  Hurts with pressure to her scalp. Sometimes worse than others.   On exam: mild erythema, burning and itching sensation to touch.   She also has itching sensation of her whole body at night at times. Allergy pills have helped at times, she's not sure if they really help with the itching or she falls asleep as they make her drowsy.     rx for hydroxyzine provided. rx for cymbalta also provided as she also has depression, chronic pain of hips.   Tsh, ccp, RF, hep C are all normal    Reactive depression (situational)  Situational depression due to her son having behavioral changes in her son due to dementia and he is currently in the hospital in restraints. Her daughter in law is also on the verge of a breakdown and had bruising due to his behavioral changes.   Patient is having a hard time and stressed and would like treatment for depression and referral to psychology.   Discussed and provided rx for cymbalta      Bilateral hip pain  Bilateral hip pain, xrays show arthritis if hips and back.   Avoid NSAIDS due to xarelto for hx of DVTs and PE  Can use tylenol arthritis, ice, exercises.   Can refer in future to PT  Encouraged regular exercise, recent stress also, stress can make pain worse.       Alpha-1-antitrypsin deficiency (HCC)  Her pulmonologist moved. She was seen in the past by Dr. Montano.   She did not have her check up with pulmonology last year, referral placed to get an appointment and PFTS  She has no difficulty with breathing. Not needing any  inhalers.   She has alpha 1 antitrypsin deficiency and hx of PE after left leg DVT in the past is on xarelto.     Chronic kidney disease, stage 3  Relatively stable,   Repeat labs ordered  She is on xarelto, avoids NSAIdS  encouraged to stay well hydrated.      Mild elevated H/H  She does not smoke  Repeat labs ordered.     Current medicines (including changes today)  Current Outpatient Medications   Medication Sig Dispense Refill   • hydrOXYzine HCl (ATARAX) 25 MG Tab Take one to two tablets by mouth at night as needed for itching. 60 Tablet 3   • DULoxetine (CYMBALTA) 30 MG Cap DR Particles Take 1 Capsule by mouth every day. 30 Capsule 3   • rivaroxaban (XARELTO) 10 MG Tab tablet Take 1 tablet by mouth with dinner. 90 tablet 3   • triamcinolone acetonide (KENALOG) 0.1 % Cream Apply thin layer 1-2 x daily to affected areas of the back for up to 4 weeks, the decrease to 3x per week 454 g 1   • calcium acetate (PHOS-LO) 667 MG Cap Take 1,334 mg by mouth 3 times a day, with meals.     • Lifitegrast 5 % Solution 1 Drop by Ophthalmic route 2 Times a Day.     • Multiple Vitamins-Minerals (MULTIVITAMIN PO) Take  by mouth.     • Cholecalciferol (VITAMIN D PO) Take  by mouth.     • Ascorbic Acid (VITAMIN C PO) Take 500 mg by mouth every day.     • omeprazole (PRILOSEC) 20 MG delayed-release capsule Take 20 mg by mouth every day.       No current facility-administered medications for this visit.     She  has a past medical history of Abdominal pain, unspecified site, Alpha 1-antitrypsin PiMS phenotype, Back pain, Bronchitis, Chickenpox, Degeneration of lumbar or lumbosacral intervertebral disc, Diarrhea, DVT (deep venous thrombosis) (HCC), Esophageal reflux, Chinese measles, Hernia of unspecified site of abdominal cavity without mention of obstruction or gangrene (repaired), History of colonoscopy (2005=normal), Influenza, Irritable bowel syndrome, Mumps, Pap smear (due), Pneumonia, Recurrent UTI (9/4/2009), Restless leg  "syndrome, Screening mammogram (10/30/2008=normal), Superficial thrombophlebitis of left leg (10/20/2017), Swelling (9/4/2009), Tonsillitis, Unspecified hemorrhoids without mention of complication, and Vein, varicose (stripped).    She has no past medical history of Encounter for long-term (current) use of other medications.    ROS  No chest pain, no shortness of breath, no abdominal pain       Objective:     /84   Pulse 96   Temp 36.4 °C (97.6 °F) (Temporal)   Resp 14   Ht 1.575 m (5' 2\")   Wt 76.7 kg (169 lb)   SpO2 96%  Body mass index is 30.91 kg/m².   Physical Exam:hard of hearing.   Constitutional: Alert, no distress.  Skin: Warm, dry, good turgor, mild erythema and burning pain to touch on top of scalp.   Eye: Equal, round and reactive, conjunctiva clear, lids normal.  ENMT: Lips without lesions, good dentition, oropharynx clear.  Neck: Trachea midline, no masses, no thyromegaly. No cervical or supraclavicular lymphadenopathy  Respiratory: Unlabored respiratory effort, lungs clear to auscultation, no wheezes, no ronchi.  Cardiovascular: Normal S1, S2, no murmur, no edema.  Abdomen: Soft, non-tender, no masses, no hepatosplenomegaly.  Psych: Alert and oriented x3, normal affect and mood.        Assessment and Plan:   The following treatment plan was discussed    1. Burning sensation      2. Reactive depression (situational)    - Referral to Psychology    3. Stage 3a chronic kidney disease (HCC)    - CBC WITH DIFFERENTIAL; Future  - Comp Metabolic Panel; Future    4. Factor V Leiden mutation (HCC)    - CBC WITH DIFFERENTIAL; Future    5. Dyslipidemia    - Lipid Profile; Future    6. Alpha-1-antitrypsin deficiency (HCC)    - Referral to Pulmonary and Sleep Medicine    7. History of pulmonary embolus (PE)    - Referral to Pulmonary and Sleep Medicine      Followup: Return in about 3 months (around 5/24/2022) for review labs, ckd, high h/h, depression, hip pain, headaches.         "

## 2022-02-24 NOTE — ASSESSMENT & PLAN NOTE
Her pulmonologist moved. She was seen in the past by Dr. Montano.   She did not have her check up with pulmonology last year, referral placed to get an appointment and PFTS  She has no difficulty with breathing. Not needing any inhalers.   She has alpha 1 antitrypsin deficiency and hx of PE after left leg DVT in the past is on xarelto.

## 2022-03-07 ENCOUNTER — OFFICE VISIT (OUTPATIENT)
Dept: SLEEP MEDICINE | Facility: MEDICAL CENTER | Age: 72
End: 2022-03-07
Payer: MEDICARE

## 2022-03-07 VITALS
OXYGEN SATURATION: 99 % | HEIGHT: 62 IN | RESPIRATION RATE: 16 BRPM | HEART RATE: 62 BPM | WEIGHT: 171 LBS | SYSTOLIC BLOOD PRESSURE: 132 MMHG | BODY MASS INDEX: 31.47 KG/M2 | DIASTOLIC BLOOD PRESSURE: 82 MMHG

## 2022-03-07 DIAGNOSIS — E88.01 ALPHA-1-ANTITRYPSIN DEFICIENCY (HCC): ICD-10-CM

## 2022-03-07 DIAGNOSIS — D68.51 FACTOR V LEIDEN MUTATION (HCC): ICD-10-CM

## 2022-03-07 PROCEDURE — 99213 OFFICE O/P EST LOW 20 MIN: CPT | Performed by: NURSE PRACTITIONER

## 2022-03-07 ASSESSMENT — FIBROSIS 4 INDEX: FIB4 SCORE: 1.08

## 2022-03-07 NOTE — PROGRESS NOTES
Chief Complaint   Patient presents with   • Follow-Up     Alpha-1 Antitrypsin Deficiency       HPI:  Hermelinda Mosley is a 71 y.o. year old female here today for follow-up on history of PE, DVT and alpha 1 antitrypsin deficiency.  Last seen 9/17/2020 by Dr Montano.  She is an alpha-1 antitrypsin deficiency,  MZ phenotype.  She is a non-smoker and denies any respiratory symptomatology, specifically dyspnea, cough, wheezing. Her previous pulmonary function testing is normal.      She has a history of factor V Leiden deficiency with DVT and pulmonary emboli in 2017.  She is chronically on Xarelto.  She denies chest pain or lower extremity edema.  She states she did notice a sharp pain in her right calf 3 days ago, and that her ankles intermittently swell occasionally but no significant problems.    ROS: As per HPI and otherwise negative if not stated.    Past Medical History:   Diagnosis Date   • Abdominal pain, unspecified site    • Alpha 1-antitrypsin PiMS phenotype    • Back pain    • Bronchitis    • Chickenpox    • Degeneration of lumbar or lumbosacral intervertebral disc    • Diarrhea    • DVT (deep venous thrombosis) (HCC)    • Esophageal reflux    • [place] measles    • Hernia of unspecified site of abdominal cavity without mention of obstruction or gangrene repaired   • History of colonoscopy 2005=normal    Dr. [facility]> [facility]   • Influenza    • Irritable bowel syndrome    • Mumps    • Pap smear due   • Pneumonia    • Recurrent UTI 9/4/2009   • Restless leg syndrome    • Screening mammogram 10/30/2008=normal   • Superficial thrombophlebitis of left leg 10/20/2017   • Swelling 9/4/2009   • Tonsillitis    • Unspecified hemorrhoids without mention of complication    • Vein, varicose stripped       Past Surgical History:   Procedure Laterality Date   • CHOLECYSTECTOMY     • HERNIA REPAIR     • VEIN STRIPPING         Family History   Problem Relation Age of Onset   • Hypertension Mother    • Diabetes Mother  "        pre diabetes   • Diabetes Father    • Hypertension Father    • Kidney Disease Father    • Respiratory Disease Brother        Social History     Socioeconomic History   • Marital status:      Spouse name: Not on file   • Number of children: Not on file   • Years of education: Not on file   • Highest education level: Not on file   Occupational History   • Not on file   Tobacco Use   • Smoking status: Former Smoker     Packs/day: 0.25     Years: 4.00     Pack years: 1.00     Types: Cigarettes     Quit date: 1969     Years since quittin.2   • Smokeless tobacco: Never Used   • Tobacco comment: only smokes 1-2 cigs a couple times a week for 1-2 years   Vaping Use   • Vaping Use: Never used   Substance and Sexual Activity   • Alcohol use: Yes     Alcohol/week: 0.0 - 1.2 oz   • Drug use: No   • Sexual activity: Yes     Partners: Male     Comment:    Other Topics Concern   • Not on file   Social History Narrative   • Not on file     Social Determinants of Health     Financial Resource Strain: Not on file   Food Insecurity: Not on file   Transportation Needs: Not on file   Physical Activity: Not on file   Stress: Not on file   Social Connections: Not on file   Intimate Partner Violence: Not on file   Housing Stability: Not on file       Allergies as of 2022   • (No Known Allergies)        Vitals:  /82 (BP Location: Left arm, Patient Position: Sitting, BP Cuff Size: Adult)   Pulse 62   Resp 16   Ht 1.575 m (5' 2\")   Wt 77.6 kg (171 lb)   SpO2 99%     Current medications as of today   Current Outpatient Medications   Medication Sig Dispense Refill   • hydrOXYzine HCl (ATARAX) 25 MG Tab Take one to two tablets by mouth at night as needed for itching. 60 Tablet 3   • DULoxetine (CYMBALTA) 30 MG Cap DR Particles Take 1 Capsule by mouth every day. 30 Capsule 3   • rivaroxaban (XARELTO) 10 MG Tab tablet Take 1 tablet by mouth with dinner. 90 tablet 3   • triamcinolone acetonide " (KENALOG) 0.1 % Cream Apply thin layer 1-2 x daily to affected areas of the back for up to 4 weeks, the decrease to 3x per week 454 g 1   • calcium acetate (PHOS-LO) 667 MG Cap Take 1,334 mg by mouth 3 times a day, with meals.     • Lifitegrast 5 % Solution 1 Drop by Ophthalmic route 2 Times a Day.     • Multiple Vitamins-Minerals (MULTIVITAMIN PO) Take  by mouth.     • Cholecalciferol (VITAMIN D PO) Take  by mouth.     • Ascorbic Acid (VITAMIN C PO) Take 500 mg by mouth every day.     • omeprazole (PRILOSEC) 20 MG delayed-release capsule Take 20 mg by mouth every day.       No current facility-administered medications for this visit.         Physical Exam:   Gen:           Alert and oriented, No apparent distress. Mood and affect appropriate, normal interaction with examiner.  Eyes:          PERRL, EOM intact, sclere white, conjunctive moist.  Ears:          Not examined. No lesions or deformities.  Hearing:     Grossly intact.  Nose:          Normal, no lesions or deformities.  Dentition:    Good dentition.  Oropharynx:   Tongue normal, posterior pharynx without erythema or exudate.  Neck:        Supple, trachea midline, no masses.  Respiratory Effort: No intercostal retractions or use of accessory muscles.   Lung Auscultation:      Clear to auscultation bilaterally; no rales, rhonchi or wheezing.  CV:            Regular rate and rhythm. No murmurs, rubs or gallops.  Abd:           Not examined. Soft non tender, non distended. Normal active bowel sounds. No masses.  Lymphadenopathy: No palpable nodes or edema.  Gait and Station: Normal.  Digits and Nails: No clubbing, cyanosis, petechiae, or nodes.   Cranial Nerves: II-XII grossly intact.  Skin:        No rashes, lesions or ulcers noted.               Ext:           No cyanosis but trace nonpitting edema noted.      Assessment:  1. Factor V Leiden mutation (HCC)  US-EXTREMITY VENOUS LOWER BILAT   2. Alpha-1-antitrypsin deficiency (HCC)  PULMONARY FUNCTION TESTS  -Test requested: Complete Pulmonary Function Test; Include MIPS/MEPS? No       Plan:  1.  Patient is currently on Xarelto daily.  She endorsed right calf pain 3 days ago and does note intermittent swelling of her ankles.  I am ordering ultrasound of lower extremities to rule out blood clot.  Will call patient with results.  2.  Patient denies any cough, chest tightness, wheezing, new or worsening shortness of breath.  Ordering PFTs, will call with results.  Patient will follow up in 1 year or sooner if needed.    Please note that this dictation was created using voice recognition software. I have made every reasonable attempt to correct obvious errors, but it is possible there are errors of grammar and possibly content that I did not discover before finalizing the note.

## 2022-03-08 NOTE — ASSESSMENT & PLAN NOTE
Component      Latest Ref Rng & Units 2/24/2020 9/14/2020 1/5/2021 8/4/2021   GFR If Non African American      >60 mL/min/1.73 m 2 51 (A) 55 (A) 52 (A) 57 (A)     Reviewed all labs with patient and answered all questions.  GFR level continues to improve.     What Is The Reason For Today's Visit?: Skin Lesion

## 2022-03-28 DIAGNOSIS — Z79.01 LONG TERM (CURRENT) USE OF ANTICOAGULANTS: ICD-10-CM

## 2022-03-29 ENCOUNTER — APPOINTMENT (OUTPATIENT)
Dept: SLEEP MEDICINE | Facility: MEDICAL CENTER | Age: 72
End: 2022-03-29
Attending: NURSE PRACTITIONER
Payer: MEDICARE

## 2022-03-31 ENCOUNTER — APPOINTMENT (OUTPATIENT)
Dept: SLEEP MEDICINE | Facility: MEDICAL CENTER | Age: 72
End: 2022-03-31
Attending: NURSE PRACTITIONER
Payer: MEDICARE

## 2022-04-20 ENCOUNTER — NON-PROVIDER VISIT (OUTPATIENT)
Dept: SLEEP MEDICINE | Facility: MEDICAL CENTER | Age: 72
End: 2022-04-20
Attending: NURSE PRACTITIONER
Payer: MEDICARE

## 2022-04-20 VITALS — BODY MASS INDEX: 31.1 KG/M2 | HEIGHT: 62 IN | WEIGHT: 169 LBS

## 2022-04-20 DIAGNOSIS — E88.01 ALPHA-1-ANTITRYPSIN DEFICIENCY (HCC): ICD-10-CM

## 2022-04-20 PROCEDURE — 94060 EVALUATION OF WHEEZING: CPT | Performed by: INTERNAL MEDICINE

## 2022-04-20 PROCEDURE — 94726 PLETHYSMOGRAPHY LUNG VOLUMES: CPT | Performed by: INTERNAL MEDICINE

## 2022-04-20 PROCEDURE — 94729 DIFFUSING CAPACITY: CPT | Performed by: INTERNAL MEDICINE

## 2022-04-20 ASSESSMENT — PULMONARY FUNCTION TESTS
FVC_PERCENT_PREDICTED: 134
FVC_PREDICTED: 2.62
FVC_PERCENT_PREDICTED: 136
FEV1/FVC_PERCENT_CHANGE: 300
FEV1_PERCENT_PREDICTED: 138
FEV1_PERCENT_CHANGE: -1
FEV1/FVC_PERCENT_PREDICTED: 104
FEV1/FVC_PERCENT_PREDICTED: 103
FEV1_PREDICTED: 2.04
FVC: 3.59
FEV1_LLN: 1.70
FEV1_PERCENT_CHANGE: -3
FEV1/FVC_PERCENT_PREDICTED: 103
FVC_LLN: 2.19
FEV1/FVC_PREDICTED: 78
FEV1/FVC_PERCENT_LLN: 65
FEV1/FVC_PERCENT_PREDICTED: 102
FEV1/FVC: 81
FEV1/FVC: 80
FEV1/FVC: 81
FEV1: 2.91
FEV1/FVC_PERCENT_PREDICTED: 78
FEV1_PERCENT_PREDICTED: 142
FVC: 3.52
FEV1/FVC: 80.11
FEV1: 2.82
FEV1/FVC_PERCENT_CHANGE: -1

## 2022-04-20 ASSESSMENT — FIBROSIS 4 INDEX: FIB4 SCORE: 1.08

## 2022-04-20 NOTE — PROCEDURES
Technician: Lorena Davies RRT, CPFT  Good patient effort & cooperation. Possible fair effort on POST FVC, asked patient if tired but stated no.   The results of this test meet the ATS/ERS standards for acceptability & reproducibility.  Test was performed on the [employer] Body Plethysmograph-Elite DX system.  Predicted equations for Spirometry are GLI-2012, ITS for lung volumes, and GLI-2017 for DLCO.  The DLCO was uncorrected for Hgb.  A bronchodilator of Ventolin HFA -2puffs via spacer administered.  DLCO performed during dilation period.    Interpretation;   Baseline spirometry shows normal airflows.  No significant bronchodilator response.  Lung volumes are within normal limits.  Diffusion capacity is within normal limits.  Normal pulmonary function testing.  All values are supranormal which suggests normal variant.  Correlate clinically.

## 2022-05-23 ENCOUNTER — HOSPITAL ENCOUNTER (OUTPATIENT)
Dept: LAB | Facility: MEDICAL CENTER | Age: 72
End: 2022-05-23
Attending: FAMILY MEDICINE
Payer: MEDICARE

## 2022-05-23 DIAGNOSIS — D68.51 FACTOR V LEIDEN MUTATION (HCC): ICD-10-CM

## 2022-05-23 DIAGNOSIS — N18.31 STAGE 3A CHRONIC KIDNEY DISEASE: ICD-10-CM

## 2022-05-23 DIAGNOSIS — E78.5 DYSLIPIDEMIA: ICD-10-CM

## 2022-05-23 LAB
ALBUMIN SERPL BCP-MCNC: 4.1 G/DL (ref 3.2–4.9)
ALBUMIN/GLOB SERPL: 1.6 G/DL
ALP SERPL-CCNC: 65 U/L (ref 30–99)
ALT SERPL-CCNC: 19 U/L (ref 2–50)
ANION GAP SERPL CALC-SCNC: 9 MMOL/L (ref 7–16)
AST SERPL-CCNC: 23 U/L (ref 12–45)
BASOPHILS # BLD AUTO: 0.5 % (ref 0–1.8)
BASOPHILS # BLD: 0.02 K/UL (ref 0–0.12)
BILIRUB SERPL-MCNC: 0.5 MG/DL (ref 0.1–1.5)
BUN SERPL-MCNC: 13 MG/DL (ref 8–22)
CALCIUM SERPL-MCNC: 9.3 MG/DL (ref 8.5–10.5)
CHLORIDE SERPL-SCNC: 107 MMOL/L (ref 96–112)
CHOLEST SERPL-MCNC: 178 MG/DL (ref 100–199)
CO2 SERPL-SCNC: 25 MMOL/L (ref 20–33)
CREAT SERPL-MCNC: 0.98 MG/DL (ref 0.5–1.4)
EOSINOPHIL # BLD AUTO: 0.09 K/UL (ref 0–0.51)
EOSINOPHIL NFR BLD: 2.3 % (ref 0–6.9)
ERYTHROCYTE [DISTWIDTH] IN BLOOD BY AUTOMATED COUNT: 39.6 FL (ref 35.9–50)
FASTING STATUS PATIENT QL REPORTED: NORMAL
GFR SERPLBLD CREATININE-BSD FMLA CKD-EPI: 61 ML/MIN/1.73 M 2
GLOBULIN SER CALC-MCNC: 2.6 G/DL (ref 1.9–3.5)
GLUCOSE SERPL-MCNC: 84 MG/DL (ref 65–99)
HCT VFR BLD AUTO: 48.8 % (ref 37–47)
HDLC SERPL-MCNC: 58 MG/DL
HGB BLD-MCNC: 16.3 G/DL (ref 12–16)
IMM GRANULOCYTES # BLD AUTO: 0.01 K/UL (ref 0–0.11)
IMM GRANULOCYTES NFR BLD AUTO: 0.3 % (ref 0–0.9)
LDLC SERPL CALC-MCNC: 106 MG/DL
LYMPHOCYTES # BLD AUTO: 1.11 K/UL (ref 1–4.8)
LYMPHOCYTES NFR BLD: 28.8 % (ref 22–41)
MCH RBC QN AUTO: 29 PG (ref 27–33)
MCHC RBC AUTO-ENTMCNC: 33.4 G/DL (ref 33.6–35)
MCV RBC AUTO: 86.8 FL (ref 81.4–97.8)
MONOCYTES # BLD AUTO: 0.39 K/UL (ref 0–0.85)
MONOCYTES NFR BLD AUTO: 10.1 % (ref 0–13.4)
NEUTROPHILS # BLD AUTO: 2.24 K/UL (ref 2–7.15)
NEUTROPHILS NFR BLD: 58 % (ref 44–72)
NRBC # BLD AUTO: 0 K/UL
NRBC BLD-RTO: 0 /100 WBC
PLATELET # BLD AUTO: 271 K/UL (ref 164–446)
PMV BLD AUTO: 10.7 FL (ref 9–12.9)
POTASSIUM SERPL-SCNC: 4.7 MMOL/L (ref 3.6–5.5)
PROT SERPL-MCNC: 6.7 G/DL (ref 6–8.2)
RBC # BLD AUTO: 5.62 M/UL (ref 4.2–5.4)
SODIUM SERPL-SCNC: 141 MMOL/L (ref 135–145)
TRIGL SERPL-MCNC: 70 MG/DL (ref 0–149)
WBC # BLD AUTO: 3.9 K/UL (ref 4.8–10.8)

## 2022-05-23 PROCEDURE — 36415 COLL VENOUS BLD VENIPUNCTURE: CPT

## 2022-05-23 PROCEDURE — 80053 COMPREHEN METABOLIC PANEL: CPT

## 2022-05-23 PROCEDURE — 85025 COMPLETE CBC W/AUTO DIFF WBC: CPT

## 2022-05-23 PROCEDURE — 80061 LIPID PANEL: CPT

## 2022-05-24 NOTE — RESULT ENCOUNTER NOTE
[facility]  Your labs show LDL cholesterol a bit above normal, H/H a bit high but improved slightly.  All other labs look good. Will discuss in detail at your 5/25 visit.   Maribel Long M.D.

## 2022-05-25 ENCOUNTER — OFFICE VISIT (OUTPATIENT)
Dept: MEDICAL GROUP | Facility: PHYSICIAN GROUP | Age: 72
End: 2022-05-25
Payer: MEDICARE

## 2022-05-25 VITALS
WEIGHT: 170 LBS | HEIGHT: 61 IN | SYSTOLIC BLOOD PRESSURE: 114 MMHG | DIASTOLIC BLOOD PRESSURE: 86 MMHG | TEMPERATURE: 98.5 F | OXYGEN SATURATION: 98 % | HEART RATE: 62 BPM | BODY MASS INDEX: 32.1 KG/M2 | RESPIRATION RATE: 20 BRPM

## 2022-05-25 DIAGNOSIS — Z86.711 HISTORY OF PULMONARY EMBOLUS (PE): ICD-10-CM

## 2022-05-25 DIAGNOSIS — M15.9 PRIMARY OSTEOARTHRITIS INVOLVING MULTIPLE JOINTS: ICD-10-CM

## 2022-05-25 DIAGNOSIS — M25.541 ARTHRALGIA OF RIGHT HAND: ICD-10-CM

## 2022-05-25 DIAGNOSIS — E78.5 DYSLIPIDEMIA: ICD-10-CM

## 2022-05-25 DIAGNOSIS — N18.31 STAGE 3A CHRONIC KIDNEY DISEASE: ICD-10-CM

## 2022-05-25 DIAGNOSIS — M19.90 ARTHRITIS: ICD-10-CM

## 2022-05-25 DIAGNOSIS — D68.51 FACTOR V LEIDEN MUTATION (HCC): ICD-10-CM

## 2022-05-25 DIAGNOSIS — K21.00 GASTROESOPHAGEAL REFLUX DISEASE WITH ESOPHAGITIS WITHOUT HEMORRHAGE: ICD-10-CM

## 2022-05-25 PROCEDURE — 99214 OFFICE O/P EST MOD 30 MIN: CPT | Performed by: FAMILY MEDICINE

## 2022-05-25 ASSESSMENT — FIBROSIS 4 INDEX: FIB4 SCORE: 1.38

## 2022-05-25 NOTE — ASSESSMENT & PLAN NOTE
Mild intermittent pain of joints, has OA  In last two years has more frequent aches and pains. She notes intermittent short lasting pain and swelling.   More pain in right wrist and right hand with swelling.   Advised her to use, ice, wrist brace.

## 2022-05-25 NOTE — ASSESSMENT & PLAN NOTE
She is on prilosec, was told by GI she could stop PPI and use pepcid as egd showed no Barretts  She feels post nasal drip and sometimes at night feels phlegm in her throat.   She uses claritin, zyrtec makes her sleepier, she uses saline nasal spray.

## 2022-05-25 NOTE — PROGRESS NOTES
Subjective:   Hermelinda Mosley is a 71 y.o. female here today for evaluation and management of:     Esophageal Reflux  She is on prilosec, was told by GI she could stop PPI and use pepcid as egd showed no Barretts  She feels post nasal drip and sometimes at night feels phlegm in her throat.   She uses claritin, zyrtec makes her sleepier, she uses saline nasal spray.     Primary osteoarthritis involving multiple joints  Mild intermittent pain of joints, has OA  In last two years has more frequent aches and pains. She notes intermittent short lasting pain and swelling.   More pain in right wrist and right hand with swelling.   Advised her to use, ice, wrist brace.       Chronic kidney disease, stage 3  GFR improved >60  Advised to stay well hydrated         Current medicines (including changes today)  Current Outpatient Medications   Medication Sig Dispense Refill   • rivaroxaban (XARELTO) 10 MG Tab tablet Take 1 Tablet by mouth with dinner. 90 Tablet 3   • triamcinolone acetonide (KENALOG) 0.1 % Cream Apply thin layer 1-2 x daily to affected areas of the back for up to 4 weeks, the decrease to 3x per week 454 g 1   • calcium acetate (PHOS-LO) 667 MG Cap Take 1,334 mg by mouth 3 times a day, with meals.     • Lifitegrast 5 % Solution 1 Drop by Ophthalmic route 2 Times a Day.     • Multiple Vitamins-Minerals (MULTIVITAMIN PO) Take  by mouth.     • Cholecalciferol (VITAMIN D PO) Take  by mouth.     • Ascorbic Acid (VITAMIN C PO) Take 500 mg by mouth every day.     • omeprazole (PRILOSEC) 20 MG delayed-release capsule Take 20 mg by mouth every day.       No current facility-administered medications for this visit.     She  has a past medical history of Abdominal pain, unspecified site, Alpha 1-antitrypsin PiMS phenotype, Back pain, Bronchitis, Chickenpox, Degeneration of lumbar or lumbosacral intervertebral disc, Diarrhea, DVT (deep venous thrombosis) (HCC), Esophageal reflux, [place]ian measles, Hernia of unspecified site  "of abdominal cavity without mention of obstruction or gangrene (repaired), History of colonoscopy (2005=normal), Influenza, Irritable bowel syndrome, Mumps, Pap smear (due), Pneumonia, Recurrent UTI (9/4/2009), Restless leg syndrome, Screening mammogram (10/30/2008=normal), Superficial thrombophlebitis of left leg (10/20/2017), Swelling (9/4/2009), Tonsillitis, Unspecified hemorrhoids without mention of complication, and Vein, varicose (stripped).    She has no past medical history of Encounter for long-term (current) use of other medications.    ROS  No chest pain, no shortness of breath, no abdominal pain       Objective:     /86   Pulse 62   Temp 36.9 °C (98.5 °F) (Temporal)   Resp 20   Ht 1.549 m (5' 1\")   Wt 77.1 kg (170 lb)   SpO2 98%  Body mass index is 32.12 kg/m².   Physical Exam:  Constitutional: Alert, no distress.  Skin: Warm, dry, good turgor, no rashes in visible areas.  Eye: Equal, round and reactive, conjunctiva clear, lids normal.  ENMT: Lips without lesions, good dentition, oropharynx clear.  Neck: Trachea midline, no masses, no thyromegaly. No cervical or supraclavicular lymphadenopathy  Respiratory: Unlabored respiratory effort, lungs clear to auscultation, no wheezes, no ronchi.  Cardiovascular: Normal S1, S2, no murmur, no edema.  Abdomen: Soft, non-tender, no masses, no hepatosplenomegaly.  Psych: Alert and oriented x3, normal affect and mood.        Assessment and Plan:   The following treatment plan was discussed    1. Gastroesophageal reflux disease with esophagitis without hemorrhage      2. Primary osteoarthritis involving multiple joints      3. Stage 3a chronic kidney disease (HCC)      4. Dyslipidemia    - Lipid Profile; Future  - Comp Metabolic Panel; Future    5. Factor V Leiden mutation (HCC)      6. History of pulmonary embolus (PE)    - CBC WITH DIFFERENTIAL; Future      Followup: Return in about 3 months (around 8/25/2022) for reactive depression, corn on foot, review " labs. .

## 2022-06-02 ENCOUNTER — HOSPITAL ENCOUNTER (OUTPATIENT)
Facility: MEDICAL CENTER | Age: 72
End: 2022-06-02
Attending: NURSE PRACTITIONER
Payer: MEDICARE

## 2022-06-02 ENCOUNTER — OFFICE VISIT (OUTPATIENT)
Dept: URGENT CARE | Facility: PHYSICIAN GROUP | Age: 72
End: 2022-06-02
Payer: MEDICARE

## 2022-06-02 VITALS
HEART RATE: 104 BPM | WEIGHT: 163 LBS | HEIGHT: 62 IN | SYSTOLIC BLOOD PRESSURE: 144 MMHG | BODY MASS INDEX: 30 KG/M2 | RESPIRATION RATE: 14 BRPM | OXYGEN SATURATION: 96 % | DIASTOLIC BLOOD PRESSURE: 82 MMHG | TEMPERATURE: 99.9 F

## 2022-06-02 DIAGNOSIS — R05.9 COUGH: ICD-10-CM

## 2022-06-02 DIAGNOSIS — J06.9 VIRAL UPPER RESPIRATORY TRACT INFECTION: ICD-10-CM

## 2022-06-02 PROCEDURE — 99214 OFFICE O/P EST MOD 30 MIN: CPT | Performed by: NURSE PRACTITIONER

## 2022-06-02 PROCEDURE — 0240U HCHG SARS-COV-2 COVID-19 NFCT DS RESP RNA 3 TRGT MIC: CPT

## 2022-06-02 RX ORDER — PROMETHAZINE HYDROCHLORIDE 6.25 MG/5ML
6.25 SYRUP ORAL
Qty: 120 ML | Refills: 0 | Status: SHIPPED | OUTPATIENT
Start: 2022-06-02 | End: 2023-02-28

## 2022-06-02 RX ORDER — BENZONATATE 100 MG/1
100 CAPSULE ORAL 3 TIMES DAILY PRN
Qty: 60 CAPSULE | Refills: 0 | Status: SHIPPED | OUTPATIENT
Start: 2022-06-02 | End: 2023-02-28

## 2022-06-02 RX ORDER — ACETAMINOPHEN 325 MG/1
650 TABLET ORAL EVERY 4 HOURS PRN
COMMUNITY
End: 2023-05-29

## 2022-06-02 ASSESSMENT — FIBROSIS 4 INDEX: FIB4 SCORE: 1.38

## 2022-06-02 NOTE — PROGRESS NOTES
Hermelinda Mosley is a 71 y.o. female who presents for Pharyngitis (Covid test wanted, flu test wanted, neg rapid covid test at home), Cough (Lung pain from coughing, throat pain from coughing), and Otalgia (Body aches. )      MOHIT Shen is a 71-year-old female who presents with sore throat.  She wants to get tested for illness.  Her  has a lot of medical problems and she is concerned about getting him sick.  She has been coughing for 2 days.  She has mild's or throat, ear pain, body aches.  She has not run a fever.  She has been taking Tylenol for discomfort.  Is mildly effective.  Does not help her cough.  She is afraid to take any medication due to her blood thinners that she takes.  She says she does not know what she can and cannot take so she just does not take anything.  She has been staying well-hydrated.  She has been trying to keep her distance from her .  She was with some family members to have also been sick.  They did home COVID test which were negative.  No other aggravating relieving factors.    ROS    No Known Allergies  Past Medical History:   Diagnosis Date   • Abdominal pain, unspecified site    • Alpha 1-antitrypsin PiMS phenotype    • Back pain    • Bronchitis    • Chickenpox    • Degeneration of lumbar or lumbosacral intervertebral disc    • Diarrhea    • DVT (deep venous thrombosis) (HCC)    • Esophageal reflux    • [place] measles    • Hernia of unspecified site of abdominal cavity without mention of obstruction or gangrene repaired   • History of colonoscopy 2005=normal    Dr. Benjamin[facility]   • Influenza    • Irritable bowel syndrome    • Mumps    • Pap smear due   • Pneumonia    • Recurrent UTI 9/4/2009   • Restless leg syndrome    • Screening mammogram 10/30/2008=normal   • Superficial thrombophlebitis of left leg 10/20/2017   • Swelling 9/4/2009   • Tonsillitis    • Unspecified hemorrhoids without mention of complication    • Vein, varicose stripped     Past  Surgical History:   Procedure Laterality Date   • CHOLECYSTECTOMY     • HERNIA REPAIR     • VEIN STRIPPING       Family History   Problem Relation Age of Onset   • Hypertension Mother    • Diabetes Mother         pre diabetes   • Diabetes Father    • Hypertension Father    • Kidney Disease Father    • Respiratory Disease Brother      Social History     Tobacco Use   • Smoking status: Former Smoker     Packs/day: 0.25     Years: 4.00     Pack years: 1.00     Types: Cigarettes     Quit date: 1969     Years since quittin.4   • Smokeless tobacco: Never Used   • Tobacco comment: only smokes 1-2 cigs a couple times a week for 1-2 years   Substance Use Topics   • Alcohol use: Yes     Alcohol/week: 0.0 - 1.2 oz     Patient Active Problem List   Diagnosis   • Other hemorrhoids   • Esophageal reflux   • Degeneration of lumbar or lumbosacral intervertebral disc   • Irritable bowel syndrome   • Osteopenia   • Left ankle instability   • Vitamin D deficiency disease   • Cervical disc disease   • Primary osteoarthritis involving multiple joints   • Chronic kidney disease, stage 3 ([facility])   • Factor V Leiden mutation ([facility])   • Varicose veins of legs   • Epigastric pressure   • Chest pressure   • Long term (current) use of anticoagulants [Z79.01]   • Vision changes   • Superficial thrombophlebitis of left leg   • Obesity (BMI 30-39.9)   • Alpha-1-antitrypsin deficiency ([facility])   • Back pain   • Burning sensation   • Bilateral hip pain   • Corn of foot   • Reactive depression (situational)     Current Outpatient Medications on File Prior to Visit   Medication Sig Dispense Refill   • acetaminophen (TYLENOL) 325 MG Tab Take 650 mg by mouth every four hours as needed.     • rivaroxaban (XARELTO) 10 MG Tab tablet Take 1 Tablet by mouth with dinner. 90 Tablet 3   • triamcinolone acetonide (KENALOG) 0.1 % Cream Apply thin layer 1-2 x daily to affected areas of the back for up to 4 weeks, the decrease to 3x per week 454 g 1   • calcium  "acetate (PHOS-LO) 667 MG Cap Take 1,334 mg by mouth 3 times a day, with meals.     • Lifitegrast 5 % Solution 1 Drop by Ophthalmic route 2 Times a Day.     • Multiple Vitamins-Minerals (MULTIVITAMIN PO) Take  by mouth.     • Cholecalciferol (VITAMIN D PO) Take  by mouth.     • Ascorbic Acid (VITAMIN C PO) Take 500 mg by mouth every day.     • omeprazole (PRILOSEC) 20 MG delayed-release capsule Take 20 mg by mouth every day.       No current facility-administered medications on file prior to visit.          Objective:     BP (!) 144/82 (BP Location: Right arm, Patient Position: Sitting, BP Cuff Size: Adult)   Pulse (!) 104   Temp 37.7 °C (99.9 °F) (Temporal)   Resp 14   Ht 1.575 m (5' 2\")   Wt 73.9 kg (163 lb)   LMP 01/01/2000 (Within Years)   SpO2 96%   BMI 29.81 kg/m²     Physical Exam  Vitals and nursing note reviewed.   Constitutional:       General: She is not in acute distress.     Appearance: Normal appearance. She is well-developed. She is not ill-appearing or toxic-appearing.   HENT:      Head: Normocephalic.      Right Ear: Hearing, ear canal and external ear normal. No middle ear effusion. Tympanic membrane is injected. Tympanic membrane is not erythematous.      Left Ear: Hearing, ear canal and external ear normal.  No middle ear effusion. Tympanic membrane is injected. Tympanic membrane is not erythematous.      Nose: No mucosal edema or rhinorrhea.      Right Sinus: No maxillary sinus tenderness or frontal sinus tenderness.      Left Sinus: No maxillary sinus tenderness or frontal sinus tenderness.      Mouth/Throat:      Mouth: Mucous membranes are moist.      Pharynx: Uvula midline. Posterior oropharyngeal erythema present. No oropharyngeal exudate.      Tonsils: No tonsillar abscesses.   Eyes:      Pupils: Pupils are equal, round, and reactive to light.   Neck:      Trachea: Trachea normal.   Cardiovascular:      Rate and Rhythm: Normal rate and regular rhythm.      Chest Wall: PMI is not " displaced.      Pulses: Normal pulses.      Heart sounds: Normal heart sounds.   Pulmonary:      Effort: Pulmonary effort is normal. No respiratory distress.      Breath sounds: Normal breath sounds. No decreased breath sounds, wheezing, rhonchi or rales.   Chest:   Breasts:      Right: No supraclavicular adenopathy.      Left: No supraclavicular adenopathy.       Abdominal:      Palpations: Abdomen is soft.      Tenderness: There is no abdominal tenderness.   Musculoskeletal:         General: Normal range of motion.      Cervical back: Full passive range of motion without pain, normal range of motion and neck supple.   Lymphadenopathy:      Cervical: No cervical adenopathy.      Upper Body:      Right upper body: No supraclavicular adenopathy.      Left upper body: No supraclavicular adenopathy.   Skin:     General: Skin is warm and dry.      Capillary Refill: Capillary refill takes less than 2 seconds.   Neurological:      Mental Status: She is alert and oriented to person, place, and time.      Gait: Gait normal.   Psychiatric:         Mood and Affect: Mood normal.         Speech: Speech normal.         Behavior: Behavior normal.         Thought Content: Thought content normal.         Assessment /Associated Orders:      1. Cough  promethazine (PHENERGAN) 6.25 MG/5ML Syrup    benzonatate (TESSALON) 100 MG Cap   2. Viral upper respiratory tract infection  CoV-2 and Flu A/B by PCR (24 hour In-House): Collect [facility] swab in [facility]       Medical Decision Making:    Pt is clinically stable at today's acute urgent care visit.  No acute distress noted. Appropriate for outpatient management at this time.   Influenza PCR pending  COVID PCR testing - pending- (results to be released to [facility] if available)   Quarantine per CDC guidelines   Educated in infection control practices.   Educated in proper administration of medication(s) ordered today including safety, possible SE, risks, benefits, rationale and alternatives to  therapy.   Educated in side effect of drowsiness, fall risk with the promethazine.  Patient verbalized understanding.  Is to be used at nighttime when she is trying to sleep.  She has had a difficult time sleeping due to her cough.  She can take the Tessalon Perles during the day time hours.  Keep well-hydrated      OTC  analgesic/ antipyretic of choice ( acetaminophen or NSAID). Follow manufactures dosing and safety precautions.   OTC medications for symptomatic relief of symptoms'  Humidifier at night prn could be helpful to reduce sx.   Keep well hydrated  Increase rest    Advised to follow-up with the primary care provider  for recheck, reevaluation, and consideration of further management if necessary.   Discussed management options (risks,benefits, and alternatives to treatment). Pt/ caregiver expressed understanding and the treatment plan was agreed upon. Questions were encouraged and answered     Discussed that this illness was viral in nature. Did not see any evidence of a bacterial process.      Resume all prior  RX medications. Take as prescribed.     Return to urgent care prn if new or worsening sx or if there is no improvement in condition prn . Red flags discussed and indications to immediately call 911 or present to the Emergency Department.       Time spent evaluating this patient was at least 32 minutes and includes preparing for visit, counseling/education, exam and evaluation, obtaining history, independent interpretation, ordering lab/test/procedures,medication management and documentation.        Please note that this dictation was created using voice recognition software. I have made a reasonable attempt to correct obvious errors, but I expect that there are errors of grammar and possibly content that I did not discover before finalizing the note.    This note was electronically signed by Ebonie MCDANIELS, Urgent Care

## 2022-06-03 DIAGNOSIS — J06.9 VIRAL UPPER RESPIRATORY TRACT INFECTION: ICD-10-CM

## 2022-06-04 DIAGNOSIS — J10.1 INFLUENZA A: ICD-10-CM

## 2022-06-04 LAB
FLUAV RNA SPEC QL NAA+PROBE: POSITIVE
FLUBV RNA SPEC QL NAA+PROBE: NEGATIVE
SARS-COV-2 RNA RESP QL NAA+PROBE: NOTDETECTED
SPECIMEN SOURCE: ABNORMAL

## 2022-06-04 RX ORDER — OSELTAMIVIR PHOSPHATE 75 MG/1
75 CAPSULE ORAL 2 TIMES DAILY
Qty: 10 CAPSULE | Refills: 0 | Status: SHIPPED | OUTPATIENT
Start: 2022-06-04 | End: 2023-02-28

## 2022-08-27 ENCOUNTER — HOSPITAL ENCOUNTER (OUTPATIENT)
Dept: LAB | Facility: MEDICAL CENTER | Age: 72
End: 2022-08-27
Attending: FAMILY MEDICINE
Payer: MEDICARE

## 2022-08-27 DIAGNOSIS — E78.5 DYSLIPIDEMIA: ICD-10-CM

## 2022-08-27 DIAGNOSIS — M25.541 ARTHRALGIA OF RIGHT HAND: ICD-10-CM

## 2022-08-27 DIAGNOSIS — M19.90 ARTHRITIS: ICD-10-CM

## 2022-08-27 DIAGNOSIS — Z86.711 HISTORY OF PULMONARY EMBOLUS (PE): ICD-10-CM

## 2022-08-27 LAB
ALBUMIN SERPL BCP-MCNC: 4.4 G/DL (ref 3.2–4.9)
ALBUMIN/GLOB SERPL: 1.6 G/DL
ALP SERPL-CCNC: 68 U/L (ref 30–99)
ALT SERPL-CCNC: 20 U/L (ref 2–50)
ANION GAP SERPL CALC-SCNC: 12 MMOL/L (ref 7–16)
AST SERPL-CCNC: 21 U/L (ref 12–45)
BASOPHILS # BLD AUTO: 0.5 % (ref 0–1.8)
BASOPHILS # BLD: 0.02 K/UL (ref 0–0.12)
BILIRUB SERPL-MCNC: 0.4 MG/DL (ref 0.1–1.5)
BUN SERPL-MCNC: 18 MG/DL (ref 8–22)
CALCIUM SERPL-MCNC: 9.6 MG/DL (ref 8.5–10.5)
CHLORIDE SERPL-SCNC: 105 MMOL/L (ref 96–112)
CHOLEST SERPL-MCNC: 207 MG/DL (ref 100–199)
CO2 SERPL-SCNC: 23 MMOL/L (ref 20–33)
CREAT SERPL-MCNC: 1.09 MG/DL (ref 0.5–1.4)
EOSINOPHIL # BLD AUTO: 0.1 K/UL (ref 0–0.51)
EOSINOPHIL NFR BLD: 2.3 % (ref 0–6.9)
ERYTHROCYTE [DISTWIDTH] IN BLOOD BY AUTOMATED COUNT: 38.8 FL (ref 35.9–50)
FASTING STATUS PATIENT QL REPORTED: NORMAL
GFR SERPLBLD CREATININE-BSD FMLA CKD-EPI: 54 ML/MIN/1.73 M 2
GLOBULIN SER CALC-MCNC: 2.7 G/DL (ref 1.9–3.5)
GLUCOSE SERPL-MCNC: 98 MG/DL (ref 65–99)
HCT VFR BLD AUTO: 48 % (ref 37–47)
HDLC SERPL-MCNC: 56 MG/DL
HGB BLD-MCNC: 16.1 G/DL (ref 12–16)
IMM GRANULOCYTES # BLD AUTO: 0.02 K/UL (ref 0–0.11)
IMM GRANULOCYTES NFR BLD AUTO: 0.5 % (ref 0–0.9)
LDLC SERPL CALC-MCNC: 130 MG/DL
LYMPHOCYTES # BLD AUTO: 1.33 K/UL (ref 1–4.8)
LYMPHOCYTES NFR BLD: 30.1 % (ref 22–41)
MCH RBC QN AUTO: 28.8 PG (ref 27–33)
MCHC RBC AUTO-ENTMCNC: 33.5 G/DL (ref 33.6–35)
MCV RBC AUTO: 85.7 FL (ref 81.4–97.8)
MONOCYTES # BLD AUTO: 0.39 K/UL (ref 0–0.85)
MONOCYTES NFR BLD AUTO: 8.8 % (ref 0–13.4)
NEUTROPHILS # BLD AUTO: 2.56 K/UL (ref 2–7.15)
NEUTROPHILS NFR BLD: 57.8 % (ref 44–72)
NRBC # BLD AUTO: 0 K/UL
NRBC BLD-RTO: 0 /100 WBC
PLATELET # BLD AUTO: 274 K/UL (ref 164–446)
PMV BLD AUTO: 10.7 FL (ref 9–12.9)
POTASSIUM SERPL-SCNC: 4.5 MMOL/L (ref 3.6–5.5)
PROT SERPL-MCNC: 7.1 G/DL (ref 6–8.2)
RBC # BLD AUTO: 5.6 M/UL (ref 4.2–5.4)
RHEUMATOID FACT SER IA-ACNC: 10 IU/ML (ref 0–14)
SODIUM SERPL-SCNC: 140 MMOL/L (ref 135–145)
TRIGL SERPL-MCNC: 105 MG/DL (ref 0–149)
WBC # BLD AUTO: 4.4 K/UL (ref 4.8–10.8)

## 2022-08-27 PROCEDURE — 86038 ANTINUCLEAR ANTIBODIES: CPT

## 2022-08-27 PROCEDURE — 85025 COMPLETE CBC W/AUTO DIFF WBC: CPT

## 2022-08-27 PROCEDURE — 86431 RHEUMATOID FACTOR QUANT: CPT

## 2022-08-27 PROCEDURE — 86200 CCP ANTIBODY: CPT

## 2022-08-27 PROCEDURE — 80053 COMPREHEN METABOLIC PANEL: CPT

## 2022-08-27 PROCEDURE — 80061 LIPID PANEL: CPT

## 2022-08-27 PROCEDURE — 36415 COLL VENOUS BLD VENIPUNCTURE: CPT

## 2022-08-30 ENCOUNTER — OFFICE VISIT (OUTPATIENT)
Dept: MEDICAL GROUP | Facility: PHYSICIAN GROUP | Age: 72
End: 2022-08-30
Payer: MEDICARE

## 2022-08-30 VITALS
WEIGHT: 167 LBS | OXYGEN SATURATION: 99 % | HEIGHT: 62 IN | SYSTOLIC BLOOD PRESSURE: 126 MMHG | BODY MASS INDEX: 30.73 KG/M2 | TEMPERATURE: 98.5 F | DIASTOLIC BLOOD PRESSURE: 72 MMHG | RESPIRATION RATE: 16 BRPM | HEART RATE: 71 BPM

## 2022-08-30 DIAGNOSIS — Z12.31 ENCOUNTER FOR SCREENING MAMMOGRAM FOR BREAST CANCER: ICD-10-CM

## 2022-08-30 DIAGNOSIS — R20.8 BURNING SENSATION: ICD-10-CM

## 2022-08-30 DIAGNOSIS — N18.31 STAGE 3A CHRONIC KIDNEY DISEASE: ICD-10-CM

## 2022-08-30 DIAGNOSIS — E78.5 DYSLIPIDEMIA: ICD-10-CM

## 2022-08-30 DIAGNOSIS — K21.00 GASTROESOPHAGEAL REFLUX DISEASE WITH ESOPHAGITIS WITHOUT HEMORRHAGE: ICD-10-CM

## 2022-08-30 LAB
CCP IGG SERPL-ACNC: 4 UNITS (ref 0–19)
NUCLEAR IGG SER QL IA: NORMAL

## 2022-08-30 PROCEDURE — 99214 OFFICE O/P EST MOD 30 MIN: CPT | Performed by: FAMILY MEDICINE

## 2022-08-30 RX ORDER — MONTELUKAST SODIUM 10 MG/1
10 TABLET ORAL DAILY
Qty: 30 TABLET | Refills: 3 | Status: SHIPPED | OUTPATIENT
Start: 2022-08-30 | End: 2023-05-29

## 2022-08-30 ASSESSMENT — PATIENT HEALTH QUESTIONNAIRE - PHQ9
SUM OF ALL RESPONSES TO PHQ QUESTIONS 1-9: 5
CLINICAL INTERPRETATION OF PHQ2 SCORE: 2
5. POOR APPETITE OR OVEREATING: 0 - NOT AT ALL

## 2022-08-30 ASSESSMENT — FIBROSIS 4 INDEX: FIB4 SCORE: 1.23

## 2022-08-30 NOTE — PROGRESS NOTES
Subjective:   Hermelinda Mosley is a 72 y.o. 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    She also noes itching of her whole body at night some nights. Last week the burning in scalp and itching all over has got worse. Allergy pills have helped at times    She gets depressed on and off and I tried cymbalta but she has not tried this yet.       I will try rx for montelukast      Chronic kidney disease, stage 3  gfr 50s  Will recheck  Encouraged hydration 8 glasses a day.   Discussed cymbalta: ok to hold off on this for now. And try counseling first for anxiety, depression with her son's dementia.       Esophageal Reflux  Was told by one GI specialist to take PPI and another said she does not need to take a PPI as egd did not show barretts'  Advised diet changes, weight loss, stop PPI, if recurrent reflux symptoms or concerns on next EGD then to continue PPI.     Dyslipidemia  ,   ascvd 11%  Discussed statin, she has room for diet changes  Will recheck labs, ascvd risk in 6 months.   Mediterranean diet information provided.        Current medicines (including changes today)  Current Outpatient Medications   Medication Sig Dispense Refill    montelukast (SINGULAIR) 10 MG Tab Take 1 Tablet by mouth every day. For allergies, itching, burning scalp pain 30 Tablet 3    oseltamivir (TAMIFLU) 75 MG Cap Take 1 Capsule by mouth 2 times a day. 10 Capsule 0    acetaminophen (TYLENOL) 325 MG Tab Take 650 mg by mouth every four hours as needed.      promethazine (PHENERGAN) 6.25 MG/5ML Syrup Take 5 mL by mouth at bedtime as needed (cough). 120 mL 0    benzonatate (TESSALON) 100 MG Cap Take 1 Capsule by mouth 3 times a day as needed for Cough. 60 Capsule 0    rivaroxaban (XARELTO) 10 MG Tab tablet Take 1  "Tablet by mouth with dinner. 90 Tablet 3    triamcinolone acetonide (KENALOG) 0.1 % Cream Apply thin layer 1-2 x daily to affected areas of the back for up to 4 weeks, the decrease to 3x per week 454 g 1    calcium acetate (PHOS-LO) 667 MG Cap Take 1,334 mg by mouth 3 times a day, with meals.      Lifitegrast 5 % Solution 1 Drop by Ophthalmic route 2 Times a Day.      Multiple Vitamins-Minerals (MULTIVITAMIN PO) Take  by mouth.      Cholecalciferol (VITAMIN D PO) Take  by mouth.      Ascorbic Acid (VITAMIN C PO) Take 500 mg by mouth every day.      omeprazole (PRILOSEC) 20 MG delayed-release capsule Take 20 mg by mouth every day.       No current facility-administered medications for this visit.     She  has a past medical history of Abdominal pain, unspecified site, Alpha 1-antitrypsin PiMS phenotype, Back pain, Bronchitis, Chickenpox, Degeneration of lumbar or lumbosacral intervertebral disc, Diarrhea, DVT (deep venous thrombosis) (HCC), Esophageal reflux, [place] measles, Hernia of unspecified site of abdominal cavity without mention of obstruction or gangrene (repaired), History of colonoscopy (2005=normal), Influenza, Irritable bowel syndrome, Mumps, Pap smear (due), Pneumonia, Recurrent UTI (9/4/2009), Restless leg syndrome, Screening mammogram (10/30/2008=normal), Superficial thrombophlebitis of left leg (10/20/2017), Swelling (9/4/2009), Tonsillitis, Unspecified hemorrhoids without mention of complication, and Vein, varicose (stripped).    She has no past medical history of Encounter for long-term (current) use of other medications.    ROS  No chest pain, no shortness of breath, no abdominal pain       Objective:     /72 (BP Location: Left arm, Patient Position: Sitting, BP Cuff Size: Small adult)   Pulse 71   Temp 36.9 °C (98.5 °F) (Temporal)   Resp 16   Ht 1.575 m (5' 2\")   Wt 75.8 kg (167 lb)   SpO2 99%  Body mass index is 30.54 kg/m².   Physical Exam:  Constitutional: Alert, no " distress.  Skin: Warm, dry, good turgor, no rashes in visible areas.  Eye: Equal, round and reactive, conjunctiva clear, lids normal.  ENMT: Lips without lesions, good dentition, oropharynx clear.  Neck: Trachea midline, no masses, no thyromegaly. No cervical or supraclavicular lymphadenopathy  Respiratory: Unlabored respiratory effort, lungs clear to auscultation, no wheezes, no ronchi.  Cardiovascular: Normal S1, S2, no murmur, no edema.  Abdomen: Soft, non-tender, no masses, no hepatosplenomegaly.  Psych: Alert and oriented x3, normal affect and mood.        Assessment and Plan:   The following treatment plan was discussed    1. Burning sensation    2. Encounter for screening mammogram for breast cancer  - MA-SCREENING MAMMO BILAT W/TOMOSYNTHESIS W/CAD; Future    3. Stage 3a chronic kidney disease (HCC)  - Renal Function Panel; Future    4. Dyslipidemia  - Lipid Profile; Future    5. Gastroesophageal reflux disease with esophagitis without hemorrhage    Other orders  - montelukast (SINGULAIR) 10 MG Tab; Take 1 Tablet by mouth every day. For allergies, itching, burning scalp pain  Dispense: 30 Tablet; Refill: 3      Followup: Return in about 6 months (around 2/28/2023) for Lab Review kidney function and cholesterol, .

## 2022-08-30 NOTE — ASSESSMENT & PLAN NOTE
,   ascvd 11%  Discussed statin, she has room for diet changes  Will recheck labs, ascvd risk in 6 months.   Mediterranean diet information provided.

## 2022-08-30 NOTE — ASSESSMENT & PLAN NOTE
gfr 50s  Will recheck  Encouraged hydration 8 glasses a day.   Discussed cymbalta: ok to hold off on this for now. And try counseling first for anxiety, depression with her son's dementia.

## 2022-08-30 NOTE — ASSESSMENT & PLAN NOTE
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    She also noes itching of her whole body at night some nights. Last week the burning in scalp and itching all over has got worse. Allergy pills have helped at times    She gets depressed on and off and I tried cymbalta but she has not tried this yet.       I will try rx for montelukast

## 2022-08-30 NOTE — ASSESSMENT & PLAN NOTE
Was told by one GI specialist to take PPI and another said she does not need to take a PPI as egd did not show barretts'  Advised diet changes, weight loss, stop PPI, if recurrent reflux symptoms or concerns on next EGD then to continue PPI.

## 2022-08-30 NOTE — PATIENT INSTRUCTIONS
Individual Talk Therapy and/or Psychological Testing, please call [phone]    Please call  to schedule your mammogram     Please get fasting labs, fasting for 8-10 hours before next visit. You can make an appointment for the lab or walk in.   Lab hours [facility] location: Monday to Friday 6 am - 4 pm, Saturday 7 am -noon  Even if you lose your lab paperwork, you can still come in to get your lab done.

## 2022-09-28 ENCOUNTER — HOSPITAL ENCOUNTER (OUTPATIENT)
Dept: RADIOLOGY | Facility: MEDICAL CENTER | Age: 72
End: 2022-09-28
Attending: FAMILY MEDICINE
Payer: MEDICARE

## 2022-09-28 DIAGNOSIS — Z12.31 ENCOUNTER FOR SCREENING MAMMOGRAM FOR BREAST CANCER: ICD-10-CM

## 2022-09-28 PROCEDURE — 77063 BREAST TOMOSYNTHESIS BI: CPT

## 2022-10-03 ENCOUNTER — HOSPITAL ENCOUNTER (OUTPATIENT)
Dept: RADIOLOGY | Facility: MEDICAL CENTER | Age: 72
End: 2022-10-03
Attending: NURSE PRACTITIONER
Payer: MEDICARE

## 2022-10-03 DIAGNOSIS — D68.51 FACTOR V LEIDEN MUTATION (HCC): ICD-10-CM

## 2022-10-03 PROCEDURE — 93970 EXTREMITY STUDY: CPT | Mod: 26,GZ | Performed by: INTERNAL MEDICINE

## 2022-10-03 PROCEDURE — 93970 EXTREMITY STUDY: CPT

## 2022-10-03 NOTE — RESULT ENCOUNTER NOTE
YUSEF Shen, there is no evidence of blood clots. These results will be discussed in detail at your next appointment.

## 2023-02-23 ENCOUNTER — APPOINTMENT (RX ONLY)
Dept: URBAN - METROPOLITAN AREA CLINIC 6 | Facility: CLINIC | Age: 73
Setting detail: DERMATOLOGY
End: 2023-02-23

## 2023-02-23 DIAGNOSIS — L81.4 OTHER MELANIN HYPERPIGMENTATION: ICD-10-CM

## 2023-02-23 DIAGNOSIS — L82.1 OTHER SEBORRHEIC KERATOSIS: ICD-10-CM

## 2023-02-23 DIAGNOSIS — R20.2 PARESTHESIA OF SKIN: ICD-10-CM

## 2023-02-23 DIAGNOSIS — L73.8 OTHER SPECIFIED FOLLICULAR DISORDERS: ICD-10-CM

## 2023-02-23 DIAGNOSIS — Z71.89 OTHER SPECIFIED COUNSELING: ICD-10-CM

## 2023-02-23 DIAGNOSIS — D18.0 HEMANGIOMA: ICD-10-CM

## 2023-02-23 DIAGNOSIS — D22 MELANOCYTIC NEVI: ICD-10-CM

## 2023-02-23 DIAGNOSIS — L82.0 INFLAMED SEBORRHEIC KERATOSIS: ICD-10-CM

## 2023-02-23 PROBLEM — D18.01 HEMANGIOMA OF SKIN AND SUBCUTANEOUS TISSUE: Status: ACTIVE | Noted: 2023-02-23

## 2023-02-23 PROBLEM — D22.72 MELANOCYTIC NEVI OF LEFT LOWER LIMB, INCLUDING HIP: Status: ACTIVE | Noted: 2023-02-23

## 2023-02-23 PROBLEM — D22.61 MELANOCYTIC NEVI OF RIGHT UPPER LIMB, INCLUDING SHOULDER: Status: ACTIVE | Noted: 2023-02-23

## 2023-02-23 PROBLEM — D22.62 MELANOCYTIC NEVI OF LEFT UPPER LIMB, INCLUDING SHOULDER: Status: ACTIVE | Noted: 2023-02-23

## 2023-02-23 PROBLEM — D22.5 MELANOCYTIC NEVI OF TRUNK: Status: ACTIVE | Noted: 2023-02-23

## 2023-02-23 PROBLEM — D22.71 MELANOCYTIC NEVI OF RIGHT LOWER LIMB, INCLUDING HIP: Status: ACTIVE | Noted: 2023-02-23

## 2023-02-23 PROCEDURE — ? LIQUID NITROGEN

## 2023-02-23 PROCEDURE — ? DIAGNOSIS COMMENT

## 2023-02-23 PROCEDURE — 99203 OFFICE O/P NEW LOW 30 MIN: CPT | Mod: 25

## 2023-02-23 PROCEDURE — ? PRESCRIPTION

## 2023-02-23 PROCEDURE — ? COUNSELING

## 2023-02-23 PROCEDURE — 17110 DESTRUCTION B9 LES UP TO 14: CPT

## 2023-02-23 RX ORDER — GABAPENTIN 100 MG/1
1 CAPSULE ORAL
Qty: 90 | Refills: 2 | Status: ERX | COMMUNITY
Start: 2023-02-23

## 2023-02-23 RX ADMIN — GABAPENTIN 1: 100 CAPSULE ORAL at 00:00

## 2023-02-23 ASSESSMENT — LOCATION SIMPLE DESCRIPTION DERM
LOCATION SIMPLE: LEFT PRETIBIAL REGION
LOCATION SIMPLE: RIGHT UPPER ARM
LOCATION SIMPLE: LEFT KNEE
LOCATION SIMPLE: LEFT UPPER BACK
LOCATION SIMPLE: SCALP
LOCATION SIMPLE: RIGHT PRETIBIAL REGION
LOCATION SIMPLE: LEFT THIGH
LOCATION SIMPLE: RIGHT FOREARM
LOCATION SIMPLE: RIGHT THIGH
LOCATION SIMPLE: LEFT UPPER ARM
LOCATION SIMPLE: RIGHT FOREHEAD
LOCATION SIMPLE: RIGHT CLAVICULAR SKIN
LOCATION SIMPLE: LEFT BREAST
LOCATION SIMPLE: UPPER BACK
LOCATION SIMPLE: RIGHT KNEE
LOCATION SIMPLE: CHEST
LOCATION SIMPLE: LEFT FOREHEAD
LOCATION SIMPLE: ABDOMEN
LOCATION SIMPLE: LEFT FOREARM

## 2023-02-23 ASSESSMENT — LOCATION DETAILED DESCRIPTION DERM
LOCATION DETAILED: RIGHT ANTERIOR DISTAL UPPER ARM
LOCATION DETAILED: RIGHT VENTRAL PROXIMAL FOREARM
LOCATION DETAILED: EPIGASTRIC SKIN
LOCATION DETAILED: LEFT FOREHEAD
LOCATION DETAILED: LEFT ANTERIOR DISTAL UPPER ARM
LOCATION DETAILED: LEFT INFRAMAMMARY CREASE (INNER QUADRANT)
LOCATION DETAILED: LEFT MID-UPPER BACK
LOCATION DETAILED: RIGHT ANTERIOR PROXIMAL UPPER ARM
LOCATION DETAILED: LEFT ANTERIOR DISTAL THIGH
LOCATION DETAILED: SUPERIOR THORACIC SPINE
LOCATION DETAILED: RIGHT FOREHEAD
LOCATION DETAILED: RIGHT ANTERIOR DISTAL THIGH
LOCATION DETAILED: LEFT VENTRAL PROXIMAL FOREARM
LOCATION DETAILED: RIGHT PROXIMAL PRETIBIAL REGION
LOCATION DETAILED: LEFT PROXIMAL PRETIBIAL REGION
LOCATION DETAILED: LEFT KNEE
LOCATION DETAILED: LEFT MEDIAL BREAST 7-8:00 REGION
LOCATION DETAILED: RIGHT CLAVICULAR SKIN
LOCATION DETAILED: LOWER STERNUM
LOCATION DETAILED: RIGHT KNEE
LOCATION DETAILED: PERIUMBILICAL SKIN
LOCATION DETAILED: LEFT ANTERIOR PROXIMAL UPPER ARM
LOCATION DETAILED: RIGHT ANTECUBITAL SKIN
LOCATION DETAILED: LEFT SUPERIOR PARIETAL SCALP

## 2023-02-23 ASSESSMENT — LOCATION ZONE DERM
LOCATION ZONE: LEG
LOCATION ZONE: TRUNK
LOCATION ZONE: FACE
LOCATION ZONE: SCALP
LOCATION ZONE: ARM

## 2023-02-23 NOTE — PROCEDURE: DIAGNOSIS COMMENT
Detail Level: Simple
Render Risk Assessment In Note?: no
Comment: Reports an intermittent burning and itching sensation for years. Had scalp biopsy with previous dermatologist showing normal appearing skin.\\nTried taking gabapentin previously with her PCP only for a few months but would like to try it again.

## 2023-02-23 NOTE — PROCEDURE: LIQUID NITROGEN
Spray Paint Technique: No
Show Topical Anesthesia Variable?: Yes
Medical Necessity Clause: This procedure was medically necessary because the lesions that were treated were:
Spray Paint Text: The liquid nitrogen was applied to the skin utilizing a spray paint frosting technique.
Number Of Freeze-Thaw Cycles: 3 freeze-thaw cycles
Post-Care Instructions: I reviewed with the patient in detail post-care instructions. Patient is to wear sunprotection, and avoid picking at any of the treated lesions. Pt may apply Vaseline to crusted or scabbing areas.
Consent: The patient's consent was obtained including but not limited to risks of crusting, scabbing, blistering, scarring, darker or lighter pigmentary change, recurrence, incomplete removal and infection.
Detail Level: Detailed
Medical Necessity Information: It is in your best interest to select a reason for this procedure from the list below. All of these items fulfill various CMS LCD requirements except the new and changing color options.

## 2023-02-25 ENCOUNTER — HOSPITAL ENCOUNTER (OUTPATIENT)
Dept: LAB | Facility: MEDICAL CENTER | Age: 73
End: 2023-02-25
Attending: FAMILY MEDICINE
Payer: MEDICARE

## 2023-02-25 DIAGNOSIS — E78.5 DYSLIPIDEMIA: ICD-10-CM

## 2023-02-25 DIAGNOSIS — N18.31 STAGE 3A CHRONIC KIDNEY DISEASE: ICD-10-CM

## 2023-02-25 LAB
ALBUMIN SERPL BCP-MCNC: 4.7 G/DL (ref 3.2–4.9)
BUN SERPL-MCNC: 15 MG/DL (ref 8–22)
CALCIUM ALBUM COR SERPL-MCNC: 9.3 MG/DL (ref 8.5–10.5)
CALCIUM SERPL-MCNC: 9.9 MG/DL (ref 8.5–10.5)
CHLORIDE SERPL-SCNC: 109 MMOL/L (ref 96–112)
CHOLEST SERPL-MCNC: 193 MG/DL (ref 100–199)
CO2 SERPL-SCNC: 25 MMOL/L (ref 20–33)
CREAT SERPL-MCNC: 0.89 MG/DL (ref 0.5–1.4)
FASTING STATUS PATIENT QL REPORTED: NORMAL
GFR SERPLBLD CREATININE-BSD FMLA CKD-EPI: 69 ML/MIN/1.73 M 2
GLUCOSE SERPL-MCNC: 82 MG/DL (ref 65–99)
HDLC SERPL-MCNC: 57 MG/DL
LDLC SERPL CALC-MCNC: 118 MG/DL
PHOSPHATE SERPL-MCNC: 3 MG/DL (ref 2.5–4.5)
POTASSIUM SERPL-SCNC: 4.8 MMOL/L (ref 3.6–5.5)
SODIUM SERPL-SCNC: 145 MMOL/L (ref 135–145)
TRIGL SERPL-MCNC: 90 MG/DL (ref 0–149)

## 2023-02-25 PROCEDURE — 80061 LIPID PANEL: CPT

## 2023-02-25 PROCEDURE — 80069 RENAL FUNCTION PANEL: CPT

## 2023-02-25 PROCEDURE — 36415 COLL VENOUS BLD VENIPUNCTURE: CPT

## 2023-02-28 ENCOUNTER — OFFICE VISIT (OUTPATIENT)
Dept: MEDICAL GROUP | Facility: PHYSICIAN GROUP | Age: 73
End: 2023-02-28
Payer: MEDICARE

## 2023-02-28 VITALS
WEIGHT: 169 LBS | RESPIRATION RATE: 16 BRPM | HEART RATE: 85 BPM | SYSTOLIC BLOOD PRESSURE: 118 MMHG | BODY MASS INDEX: 29.95 KG/M2 | DIASTOLIC BLOOD PRESSURE: 80 MMHG | OXYGEN SATURATION: 99 % | HEIGHT: 63 IN | TEMPERATURE: 97.1 F

## 2023-02-28 DIAGNOSIS — D68.51 FACTOR V LEIDEN MUTATION (HCC): ICD-10-CM

## 2023-02-28 DIAGNOSIS — F32.9 REACTIVE DEPRESSION: ICD-10-CM

## 2023-02-28 DIAGNOSIS — E88.01 ALPHA-1-ANTITRYPSIN DEFICIENCY (HCC): ICD-10-CM

## 2023-02-28 DIAGNOSIS — E78.5 DYSLIPIDEMIA: ICD-10-CM

## 2023-02-28 DIAGNOSIS — N18.31 CHRONIC KIDNEY DISEASE, STAGE 3A: ICD-10-CM

## 2023-02-28 PROCEDURE — 99214 OFFICE O/P EST MOD 30 MIN: CPT | Performed by: FAMILY MEDICINE

## 2023-02-28 RX ORDER — GABAPENTIN 100 MG/1
CAPSULE ORAL
COMMUNITY
Start: 2023-02-24 | End: 2023-02-28

## 2023-02-28 RX ORDER — GABAPENTIN 100 MG/1
100 CAPSULE ORAL 3 TIMES DAILY
COMMUNITY
End: 2023-05-29

## 2023-02-28 RX ORDER — ROSUVASTATIN CALCIUM 20 MG/1
20 TABLET, COATED ORAL EVERY EVENING
Qty: 90 TABLET | Refills: 3 | Status: ON HOLD | OUTPATIENT
Start: 2023-02-28 | End: 2023-05-30

## 2023-02-28 ASSESSMENT — PATIENT HEALTH QUESTIONNAIRE - PHQ9: CLINICAL INTERPRETATION OF PHQ2 SCORE: 0

## 2023-02-28 ASSESSMENT — FIBROSIS 4 INDEX: FIB4 SCORE: 1.23

## 2023-02-28 NOTE — PATIENT INSTRUCTIONS
Please get fasting labs, fasting for 8-10 hours before next visit. You can make an appointment for the lab or walk in.   Lab hours [facility] location: Monday to Friday 6 am - 4 pm, Saturday 7 am -noon  Even if you lose your lab paperwork, you can still come in to get your lab done.

## 2023-02-28 NOTE — ASSESSMENT & PLAN NOTE
Has on breathing difficulty, coughing or wheezing  Recent viral URI resolving  She had a PE is on xarelto  Has appointment next month with pulmonology.

## 2023-03-02 ENCOUNTER — APPOINTMENT (OUTPATIENT)
Dept: SLEEP MEDICINE | Facility: MEDICAL CENTER | Age: 73
End: 2023-03-02
Attending: NURSE PRACTITIONER
Payer: MEDICARE

## 2023-03-03 DIAGNOSIS — E88.01 ALPHA-1-ANTITRYPSIN DEFICIENCY (HCC): ICD-10-CM

## 2023-03-03 DIAGNOSIS — Z86.711 HISTORY OF PULMONARY EMBOLUS (PE): ICD-10-CM

## 2023-03-03 NOTE — PROGRESS NOTES
Subjective:   Hermelinda Mosley is a 72 y.o. female here today for evaluation and management of:     Alpha-1-antitrypsin deficiency (HCC)  Has on breathing difficulty, coughing or wheezing  Recent viral URI resolving  She had a PE is on xarelto  Has appointment next month with pulmonology.      [facility] Form    Diagnosis to address: E88.01 - Alpha-1-antitrypsin deficiency (HCC)  Assessment and plan: Chronic, stable. Continue with current defined treatment plan: continue to monitor respiratory function. Up to date on her flu shot.  Follow-up at least annually.  Diagnosis: D68.51 - Factor V Leiden mutation ([facility])  Assessment and plan: Chronic, stable. Continue with current defined treatment plan: hx of PE continue on xarelto Follow-up at least annually.  Diagnosis: N18.31 - Chronic kidney disease, stage 3a ([facility])  Assessment and plan: Chronic, improving. Continue with current defined treatment plan: continue water hydration 64 oz a day  Follow-up at least annually.  Diagnosis: F32.9 - Reactive depression  Assessment and plan: Chronic, stable. Continue with current defined treatment plan: continue to monitor. Follow-up at least annually.  Last edited 03/03/23 09:28 PST by Maribel Long M.D.             Current medicines (including changes today)  Current Outpatient Medications   Medication Sig Dispense Refill    rosuvastatin (CRESTOR) 20 MG Tab Take 1 Tablet by mouth every evening. For high cholesterol 90 Tablet 3    Capsaicin 0.035 % Lotion Apply a thin film to affected skin 3-4 times a day 113 g 5    gabapentin (NEURONTIN) 100 MG Cap Take 100 mg by mouth 3 times a day.      montelukast (SINGULAIR) 10 MG Tab Take 1 Tablet by mouth every day. For allergies, itching, burning scalp pain 30 Tablet 3    acetaminophen (TYLENOL) 325 MG Tab Take 650 mg by mouth every four hours as needed.      rivaroxaban (XARELTO) 10 MG Tab tablet Take 1 Tablet by mouth with dinner. 90 Tablet 3    triamcinolone acetonide (KENALOG) 0.1 %  "Cream Apply thin layer 1-2 x daily to affected areas of the back for up to 4 weeks, the decrease to 3x per week 454 g 1    calcium acetate (PHOS-LO) 667 MG Cap Take 1,334 mg by mouth 3 times a day, with meals.      Lifitegrast 5 % Solution 1 Drop by Ophthalmic route 2 Times a Day.      Multiple Vitamins-Minerals (MULTIVITAMIN PO) Take  by mouth.      Cholecalciferol (VITAMIN D PO) Take  by mouth.      Ascorbic Acid (VITAMIN C PO) Take 500 mg by mouth every day.      omeprazole (PRILOSEC) 20 MG delayed-release capsule Take 20 mg by mouth every day.       No current facility-administered medications for this visit.     She  has a past medical history of Abdominal pain, unspecified site, Alpha 1-antitrypsin PiMS phenotype, Back pain, Bronchitis, Chickenpox, Degeneration of lumbar or lumbosacral intervertebral disc, Diarrhea, DVT (deep venous thrombosis) (HCC), Esophageal reflux, [place] measles, Hernia of unspecified site of abdominal cavity without mention of obstruction or gangrene (repaired), History of colonoscopy (2005=normal), Influenza, Irritable bowel syndrome, Mumps, Pap smear (due), Pneumonia, Recurrent UTI (9/4/2009), Restless leg syndrome, Screening mammogram (10/30/2008=normal), Superficial thrombophlebitis of left leg (10/20/2017), Swelling (9/4/2009), Tonsillitis, Unspecified hemorrhoids without mention of complication, and Vein, varicose (stripped).    She has no past medical history of Encounter for long-term (current) use of other medications.    ROS  No chest pain, no shortness of breath, no abdominal pain       Objective:     /80 (BP Location: Left arm, Patient Position: Sitting, BP Cuff Size: Adult long)   Pulse 85   Temp 36.2 °C (97.1 °F) (Temporal)   Resp 16   Ht 1.588 m (5' 2.5\")   Wt 76.7 kg (169 lb)   SpO2 99%  Body mass index is 30.42 kg/m².   Physical Exam:  Constitutional: Alert, no distress.  Skin: Warm, dry, good turgor, no rashes in visible areas.  Eye: Equal, round and " reactive, conjunctiva clear, lids normal.  ENMT: Lips without lesions, good dentition, oropharynx clear.  Neck: Trachea midline, no masses, no thyromegaly. No cervical or supraclavicular lymphadenopathy  Respiratory: Unlabored respiratory effort, lungs clear to auscultation, no wheezes, no ronchi.  Cardiovascular: Normal S1, S2, no murmur, no edema.  Abdomen: Soft, non-tender, no masses, no hepatosplenomegaly.  Psych: Alert and oriented x3, normal affect and mood.        Assessment and Plan:   The following treatment plan was discussed    1. Dyslipidemia  - rosuvastatin (CRESTOR) 20 MG Tab; Take 1 Tablet by mouth every evening. For high cholesterol  Dispense: 90 Tablet; Refill: 3  - Comp Metabolic Panel; Future    2. Alpha-1-antitrypsin deficiency (HCC)    3. Chronic kidney disease, stage 3a (HCC)    4. Factor V Leiden mutation (HCC)    5. Reactive depression    Other orders  - Capsaicin 0.035 % Lotion; Apply a thin film to affected skin 3-4 times a day  Dispense: 113 g; Refill: 5  - gabapentin (NEURONTIN) 100 MG Cap; Take 100 mg by mouth 3 times a day.      Followup: Return in about 4 months (around 6/28/2023) for Lab Review.

## 2023-03-31 DIAGNOSIS — Z79.01 LONG TERM (CURRENT) USE OF ANTICOAGULANTS: ICD-10-CM

## 2023-04-03 ENCOUNTER — TELEPHONE (OUTPATIENT)
Dept: HEALTH INFORMATION MANAGEMENT | Facility: OTHER | Age: 73
End: 2023-04-03
Payer: MEDICARE

## 2023-04-03 NOTE — TELEPHONE ENCOUNTER
Received request via: Patient    Was the patient seen in the last year in this department? Yes    Does the patient have an active prescription (recently filled or refills available) for medication(s) requested? No    Does the patient have [facility] and need 100 day supply (blood pressure, diabetes and cholesterol meds only)? Patient does not have SCP    Last Office Visit:02/28/2023  Last Labs:02/25/2023

## 2023-04-24 ENCOUNTER — APPOINTMENT (OUTPATIENT)
Dept: RADIOLOGY | Facility: MEDICAL CENTER | Age: 73
End: 2023-04-24
Attending: EMERGENCY MEDICINE
Payer: MEDICARE

## 2023-04-24 ENCOUNTER — OFFICE VISIT (OUTPATIENT)
Dept: URGENT CARE | Facility: PHYSICIAN GROUP | Age: 73
End: 2023-04-24
Payer: MEDICARE

## 2023-04-24 ENCOUNTER — HOSPITAL ENCOUNTER (EMERGENCY)
Facility: MEDICAL CENTER | Age: 73
End: 2023-04-24
Attending: EMERGENCY MEDICINE
Payer: MEDICARE

## 2023-04-24 VITALS
RESPIRATION RATE: 14 BRPM | HEIGHT: 62 IN | OXYGEN SATURATION: 99 % | DIASTOLIC BLOOD PRESSURE: 82 MMHG | SYSTOLIC BLOOD PRESSURE: 124 MMHG | WEIGHT: 167 LBS | BODY MASS INDEX: 30.73 KG/M2 | TEMPERATURE: 99.1 F | HEART RATE: 80 BPM

## 2023-04-24 VITALS
WEIGHT: 166.45 LBS | RESPIRATION RATE: 19 BRPM | TEMPERATURE: 98.3 F | DIASTOLIC BLOOD PRESSURE: 70 MMHG | HEART RATE: 68 BPM | SYSTOLIC BLOOD PRESSURE: 136 MMHG | BODY MASS INDEX: 30.44 KG/M2 | OXYGEN SATURATION: 97 %

## 2023-04-24 DIAGNOSIS — R10.31 RIGHT LOWER QUADRANT ABDOMINAL PAIN: ICD-10-CM

## 2023-04-24 DIAGNOSIS — R10.9 RIGHT FLANK PAIN: ICD-10-CM

## 2023-04-24 DIAGNOSIS — M79.10 MYALGIA: ICD-10-CM

## 2023-04-24 DIAGNOSIS — M79.606 PAIN OF LOWER EXTREMITY, UNSPECIFIED LATERALITY: ICD-10-CM

## 2023-04-24 LAB
ALBUMIN SERPL BCP-MCNC: 4.5 G/DL (ref 3.2–4.9)
ALBUMIN/GLOB SERPL: 1.6 G/DL
ALP SERPL-CCNC: 67 U/L (ref 30–99)
ALT SERPL-CCNC: 25 U/L (ref 2–50)
ANION GAP SERPL CALC-SCNC: 11 MMOL/L (ref 7–16)
APPEARANCE UR: CLEAR
APPEARANCE UR: CLEAR
AST SERPL-CCNC: 25 U/L (ref 12–45)
BASOPHILS # BLD AUTO: 0.4 % (ref 0–1.8)
BASOPHILS # BLD: 0.03 K/UL (ref 0–0.12)
BILIRUB SERPL-MCNC: 0.4 MG/DL (ref 0.1–1.5)
BILIRUB UR QL STRIP.AUTO: NEGATIVE
BILIRUB UR STRIP-MCNC: NORMAL MG/DL
BUN SERPL-MCNC: 10 MG/DL (ref 8–22)
CALCIUM ALBUM COR SERPL-MCNC: 9.2 MG/DL (ref 8.5–10.5)
CALCIUM SERPL-MCNC: 9.6 MG/DL (ref 8.4–10.2)
CHLORIDE SERPL-SCNC: 104 MMOL/L (ref 96–112)
CO2 SERPL-SCNC: 26 MMOL/L (ref 20–33)
COLOR UR AUTO: YELLOW
COLOR UR: YELLOW
CREAT SERPL-MCNC: 0.89 MG/DL (ref 0.5–1.4)
EOSINOPHIL # BLD AUTO: 0.06 K/UL (ref 0–0.51)
EOSINOPHIL NFR BLD: 0.9 % (ref 0–6.9)
ERYTHROCYTE [DISTWIDTH] IN BLOOD BY AUTOMATED COUNT: 39 FL (ref 35.9–50)
GFR SERPLBLD CREATININE-BSD FMLA CKD-EPI: 68 ML/MIN/1.73 M 2
GLOBULIN SER CALC-MCNC: 2.8 G/DL (ref 1.9–3.5)
GLUCOSE SERPL-MCNC: 81 MG/DL (ref 65–99)
GLUCOSE UR STRIP.AUTO-MCNC: NEGATIVE MG/DL
GLUCOSE UR STRIP.AUTO-MCNC: NORMAL MG/DL
HCT VFR BLD AUTO: 49.7 % (ref 37–47)
HGB BLD-MCNC: 16.5 G/DL (ref 12–16)
IMM GRANULOCYTES # BLD AUTO: 0.01 K/UL (ref 0–0.11)
IMM GRANULOCYTES NFR BLD AUTO: 0.1 % (ref 0–0.9)
KETONES UR STRIP.AUTO-MCNC: NEGATIVE MG/DL
KETONES UR STRIP.AUTO-MCNC: NORMAL MG/DL
LEUKOCYTE ESTERASE UR QL STRIP.AUTO: NEGATIVE
LEUKOCYTE ESTERASE UR QL STRIP.AUTO: NORMAL
LIPASE SERPL-CCNC: 29 U/L (ref 7–58)
LYMPHOCYTES # BLD AUTO: 1.8 K/UL (ref 1–4.8)
LYMPHOCYTES NFR BLD: 25.6 % (ref 22–41)
MCH RBC QN AUTO: 28.7 PG (ref 27–33)
MCHC RBC AUTO-ENTMCNC: 33.2 G/DL (ref 33.6–35)
MCV RBC AUTO: 86.6 FL (ref 81.4–97.8)
MICRO URNS: NORMAL
MONOCYTES # BLD AUTO: 0.56 K/UL (ref 0–0.85)
MONOCYTES NFR BLD AUTO: 8 % (ref 0–13.4)
NEUTROPHILS # BLD AUTO: 4.58 K/UL (ref 2–7.15)
NEUTROPHILS NFR BLD: 65 % (ref 44–72)
NITRITE UR QL STRIP.AUTO: NEGATIVE
NITRITE UR QL STRIP.AUTO: NORMAL
NRBC # BLD AUTO: 0 K/UL
NRBC BLD-RTO: 0 /100 WBC
PH UR STRIP.AUTO: 6 [PH] (ref 5–8)
PH UR STRIP.AUTO: 6.5 [PH] (ref 5–8)
PLATELET # BLD AUTO: 269 K/UL (ref 164–446)
PMV BLD AUTO: 10.1 FL (ref 9–12.9)
POTASSIUM SERPL-SCNC: 3.9 MMOL/L (ref 3.6–5.5)
PROT SERPL-MCNC: 7.3 G/DL (ref 6–8.2)
PROT UR QL STRIP: NEGATIVE MG/DL
PROT UR QL STRIP: NORMAL MG/DL
RBC # BLD AUTO: 5.74 M/UL (ref 4.2–5.4)
RBC UR QL AUTO: NEGATIVE
RBC UR QL AUTO: NORMAL
SODIUM SERPL-SCNC: 141 MMOL/L (ref 135–145)
SP GR UR STRIP.AUTO: 1.01
SP GR UR STRIP.AUTO: 1.02
UROBILINOGEN UR STRIP-MCNC: 0.2 MG/DL
WBC # BLD AUTO: 7 K/UL (ref 4.8–10.8)

## 2023-04-24 PROCEDURE — 81003 URINALYSIS AUTO W/O SCOPE: CPT

## 2023-04-24 PROCEDURE — 36415 COLL VENOUS BLD VENIPUNCTURE: CPT

## 2023-04-24 PROCEDURE — 75635 CT ANGIO ABDOMINAL ARTERIES: CPT

## 2023-04-24 PROCEDURE — 700117 HCHG RX CONTRAST REV CODE 255: Performed by: EMERGENCY MEDICINE

## 2023-04-24 PROCEDURE — 83690 ASSAY OF LIPASE: CPT

## 2023-04-24 PROCEDURE — 85025 COMPLETE CBC W/AUTO DIFF WBC: CPT

## 2023-04-24 PROCEDURE — 80053 COMPREHEN METABOLIC PANEL: CPT

## 2023-04-24 PROCEDURE — 99284 EMERGENCY DEPT VISIT MOD MDM: CPT

## 2023-04-24 PROCEDURE — 99214 OFFICE O/P EST MOD 30 MIN: CPT | Performed by: FAMILY MEDICINE

## 2023-04-24 PROCEDURE — 81002 URINALYSIS NONAUTO W/O SCOPE: CPT | Performed by: FAMILY MEDICINE

## 2023-04-24 RX ADMIN — IOHEXOL 30 ML: 350 INJECTION, SOLUTION INTRAVENOUS at 18:13

## 2023-04-24 RX ADMIN — IOHEXOL 100 ML: 350 INJECTION, SOLUTION INTRAVENOUS at 18:12

## 2023-04-24 ASSESSMENT — ENCOUNTER SYMPTOMS
NAUSEA: 0
VOMITING: 0
FEVER: 0
FLANK PAIN: 1
ABDOMINAL PAIN: 1
FATIGUE: 1
SHORTNESS OF BREATH: 0
COUGH: 0
SORE THROAT: 0
DIZZINESS: 0
CHILLS: 0
MYALGIAS: 1

## 2023-04-24 ASSESSMENT — FIBROSIS 4 INDEX
FIB4 SCORE: 1.23
FIB4 SCORE: 1.23

## 2023-04-24 ASSESSMENT — PAIN DESCRIPTION - PAIN TYPE: TYPE: ACUTE PAIN

## 2023-04-24 NOTE — ED PROVIDER NOTES
ED Provider Note    CHIEF COMPLAINT  Chief Complaint   Patient presents with    Flank Pain     Right sided    Leg Pain     Right leg       LIMITATION TO HISTORY   Select: None.    HPI    Hermelinda Mosley is a 72 y.o. female who presents to the Emergency Department with a chief complaint of flank pain.  It is on the right side.  It radiates down to her right leg intermittently down to her right groin intermittently.  There is been no alleviating factors exacerbated when she lies on her right side or even her left side.  There is been no associated dysuria urgency or frequency.  There is been no associated nausea or vomiting.  No fever or chills.    She did state that on Saturday or Sunday she did have an episode where her pain then went down to her leg and her knee she felt her leg and her foot cramp up and deviate.  That has since resolved but her knee still feels a little sore.    The only thing that is new is the patient did start a statin this month.    States that she has a history of DVT PE.  She did run out of her medications and for 4 days she did not have anticoagulation.  She has factor V Leiden.  She is no history of leg swelling.  No pleuritic pain.    OUTSIDE HISTORIAN(S):  Select: None    EXTERNAL RECORDS REVIEWED  Select: Other I did review the x-rays    Assessment/Plan:   1. Right lower quadrant abdominal pain     2. Right flank pain  - POCT Urinalysis     3. Myalgia           Medical Decision Making/Course:  In the context of the clinical presentation of right lower quadrant pain with right flank pain with significant physical exam findings and in the further context of the patient's comorbidities as noted there is a clinical concern for acute appendicitis and other intra-abdominal pelvic pathology and/or metabolic or systemic pathology and accordingly emergency department evaluation management is warranted.  Patient requested transport by private vehicle and the [facility]  department was advised and the transfer center notified.  In the course of preparing for this visit with review of the pertinent past medical history, recent and past clinic visits, current medications, and performing chart, immunization, medical history and medication reconciliation, and in the further course of obtaining the current history pertinent to the clinic visit today, performing an exam and evaluation, ordering and independently evaluating labs, tests including point-of-care urinalysis, and/or procedures, prescribing any recommended new medications as noted above, providing any pertinent counseling and education and recommending further coordination of care including contact and coordination with transfer center, at least  42 minutes of total time were spent during this encounter.       REVIEW OF SYSTEMS  General: No fevers.  Monitoring: No difficulty breathing.    PAST MEDICAL HISTORY  Past Medical History:   Diagnosis Date    Abdominal pain, unspecified site     Alpha 1-antitrypsin PiMS phenotype     Back pain     Bronchitis     Chickenpox     Degeneration of lumbar or lumbosacral intervertebral disc     Diarrhea     DVT (deep venous thrombosis) (HCC)     Esophageal reflux     [place] measles     Hernia of unspecified site of abdominal cavity without mention of obstruction or gangrene repaired    History of colonoscopy 2005=normal    Dr. Benjamin> [facility]    Influenza     Irritable bowel syndrome     Mumps     Pap smear due    Pneumonia     Recurrent UTI 9/4/2009    Restless leg syndrome     Screening mammogram 10/30/2008=normal    Superficial thrombophlebitis of left leg 10/20/2017    Swelling 9/4/2009    Tonsillitis     Unspecified hemorrhoids without mention of complication     Vein, varicose stripped       FAMILY HISTORY  Family History   Problem Relation Age of Onset    Hypertension Mother     Diabetes Mother         pre diabetes    Diabetes Father     Hypertension Father     Kidney Disease  Father     Respiratory Disease Brother        SOCIAL HISTORY  Social History     Tobacco Use    Smoking status: Former     Packs/day: 0.25     Years: 4.00     Pack years: 1.00     Types: Cigarettes     Quit date: 1969     Years since quittin.3    Smokeless tobacco: Never    Tobacco comments:     only smokes 1-2 cigs a couple times a week for 1-2 years   Vaping Use    Vaping Use: Never used   Substance Use Topics    Alcohol use: Yes     Alcohol/week: 0.0 - 1.2 oz    Drug use: No     Social History     Substance and Sexual Activity   Drug Use No       SURGICAL HISTORY  Past Surgical History:   Procedure Laterality Date    CHOLECYSTECTOMY      HERNIA REPAIR      VEIN STRIPPING         CURRENT MEDICATIONS  No current facility-administered medications for this encounter.    Current Outpatient Medications:     rivaroxaban (XARELTO) 10 MG Tab tablet, Take 1 Tablet by mouth with dinner., Disp: 90 Tablet, Rfl: 3    rosuvastatin (CRESTOR) 20 MG Tab, Take 1 Tablet by mouth every evening. For high cholesterol, Disp: 90 Tablet, Rfl: 3    Capsaicin 0.035 % Lotion, Apply a thin film to affected skin 3-4 times a day, Disp: 113 g, Rfl: 5    gabapentin (NEURONTIN) 100 MG Cap, Take 100 mg by mouth 3 times a day. (Patient not taking: Reported on 2023), Disp: , Rfl:     montelukast (SINGULAIR) 10 MG Tab, Take 1 Tablet by mouth every day. For allergies, itching, burning scalp pain, Disp: 30 Tablet, Rfl: 3    acetaminophen (TYLENOL) 325 MG Tab, Take 650 mg by mouth every four hours as needed., Disp: , Rfl:     triamcinolone acetonide (KENALOG) 0.1 % Cream, Apply thin layer 1-2 x daily to affected areas of the back for up to 4 weeks, the decrease to 3x per week, Disp: 454 g, Rfl: 1    calcium acetate (PHOS-LO) 667 MG Cap, Take 1,334 mg by mouth 3 times a day, with meals., Disp: , Rfl:     Lifitegrast 5 % Solution, 1 Drop by Ophthalmic route 2 Times a Day., Disp: , Rfl:     Multiple Vitamins-Minerals (MULTIVITAMIN PO), Take   by mouth., Disp: , Rfl:     Cholecalciferol (VITAMIN D PO), Take  by mouth., Disp: , Rfl:     Ascorbic Acid (VITAMIN C PO), Take 500 mg by mouth every day., Disp: , Rfl:     omeprazole (PRILOSEC) 20 MG delayed-release capsule, Take 20 mg by mouth every day., Disp: , Rfl:     ALLERGIES  No Known Allergies    PHYSICAL EXAM  VITAL SIGNS: BP (!) 148/84   Pulse 70   Temp 36.7 °C (98 °F) (Temporal)   Resp 18   Wt 75.5 kg (166 lb 7.2 oz)   LMP 01/01/2000 (Within Years)   SpO2 97%   BMI 30.44 kg/m²   Reviewed and noted.  Elevated blood pressure borderline.  Pulse ox temperature normal.  Constitutional: Well developed, Well nourished, no acute distress..  HENT: Normocephalic, atraumatic, bilateral external ears normal, No intraoral erythema, edema, exudate  Eyes: PERRLA, conjunctiva pink, no scleral icterus.   Cardiovascular: Regular rate and rhythm. No murmurs, rubs or gallops.  No dependent edema or calf tenderness  Respiratory: Lungs clear to auscultation bilaterally. No wheezes, rales, or rhonchi.  Abdominal:  Abdomen soft, non-tender, non distended. No rebound, or guarding.    Skin: No erythema, no rash. No wounds or bruising.  Genitourinary: No costovertebral angle tenderness.   Musculoskeletal: no deformities.  There is no specific paraspinal or midline tenderness step-off or deformity that I can elicit in the back area.  Neurologic: Alert, no facial droop noted. All extra ocular muscles intact. Moves all extremities with out weakness noed  Psychiatric: Affect normal, Judgment normal, Mood normal.     LABS Ordered and Reviewed by Me:  Results for orders placed or performed during the hospital encounter of 04/24/23   CBC WITH DIFFERENTIAL   Result Value Ref Range    WBC 7.0 4.8 - 10.8 K/uL    RBC 5.74 (H) 4.20 - 5.40 M/uL    Hemoglobin 16.5 (H) 12.0 - 16.0 g/dL    Hematocrit 49.7 (H) 37.0 - 47.0 %    MCV 86.6 81.4 - 97.8 fL    MCH 28.7 27.0 - 33.0 pg    MCHC 33.2 (L) 33.6 - 35.0 g/dL    RDW 39.0 35.9 - 50.0 fL     Platelet Count 269 164 - 446 K/uL    MPV 10.1 9.0 - 12.9 fL    Neutrophils-Polys 65.00 44.00 - 72.00 %    Lymphocytes 25.60 22.00 - 41.00 %    Monocytes 8.00 0.00 - 13.40 %    Eosinophils 0.90 0.00 - 6.90 %    Basophils 0.40 0.00 - 1.80 %    Immature Granulocytes 0.10 0.00 - 0.90 %    Nucleated RBC 0.00 /100 WBC    Neutrophils (Absolute) 4.58 2.00 - 7.15 K/uL    Lymphs (Absolute) 1.80 1.00 - 4.80 K/uL    Monos (Absolute) 0.56 0.00 - 0.85 K/uL    Eos (Absolute) 0.06 0.00 - 0.51 K/uL    Baso (Absolute) 0.03 0.00 - 0.12 K/uL    Immature Granulocytes (abs) 0.01 0.00 - 0.11 K/uL    NRBC (Absolute) 0.00 K/uL   COMP METABOLIC PANEL   Result Value Ref Range    Sodium 141 135 - 145 mmol/L    Potassium 3.9 3.6 - 5.5 mmol/L    Chloride 104 96 - 112 mmol/L    Co2 26 20 - 33 mmol/L    Anion Gap 11.0 7.0 - 16.0    Glucose 81 65 - 99 mg/dL    Bun 10 8 - 22 mg/dL    Creatinine 0.89 0.50 - 1.40 mg/dL    Calcium 9.6 8.4 - 10.2 mg/dL    AST(SGOT) 25 12 - 45 U/L    ALT(SGPT) 25 2 - 50 U/L    Alkaline Phosphatase 67 30 - 99 U/L    Total Bilirubin 0.4 0.1 - 1.5 mg/dL    Albumin 4.5 3.2 - 4.9 g/dL    Total Protein 7.3 6.0 - 8.2 g/dL    Globulin 2.8 1.9 - 3.5 g/dL    A-G Ratio 1.6 g/dL   URINALYSIS CULTURE, IF INDICATED    Specimen: Blood   Result Value Ref Range    Color Yellow     Character Clear     Specific Gravity 1.020 <1.035    Ph 6.0 5.0 - 8.0    Glucose Negative Negative mg/dL    Ketones Negative Negative mg/dL    Protein Negative Negative mg/dL    Bilirubin Negative Negative    Nitrite Negative Negative    Leukocyte Esterase Negative Negative    Occult Blood Negative Negative    Micro Urine Req see below    LIPASE   Result Value Ref Range    Lipase 29 7 - 58 U/L   CORRECTED CALCIUM   Result Value Ref Range    Correct Calcium 9.2 8.5 - 10.5 mg/dL   ESTIMATED GFR   Result Value Ref Range    GFR (CKD-EPI) 68 >60 mL/min/1.73 m 2         RADIOLOGY    Radiologist interpretation:   CT-CTA AORTA-RO WITH & W/O-POST PROCESS   Final  Result      1.  Normal abdominal CTA with bilateral lower extremity runoff. No significant atherosclerosis in the arteries of the lower extremities.   2.  No acute inflammatory process in the abdomen or pelvis.   3.  Prior cholecystectomy.   4.  Colonic diverticulosis.                     ED COURSE:    ED Observation Status? Yes; Patient placed in observation status at 4:45 PM 04/24/23     Observation plan is as follows:   ET scan  Patient declines pain medicine  Urinalysis  CBC  Metabolic panel    INTERVENTIONS BY ME:  Medications   iohexol (OMNIPAQUE) 350 mg/mL (IV) (100 mL Intravenous Given 4/24/23 1812)   iohexol (OMNIPAQUE) 350 mg/mL (IV) (30 mL Intravenous Given 4/24/23 1813)       Response on recheck:    .      I have discussed management of the patient with the following physicians and sources:       Patient discharged from ED observation at 4:45 PM 04/24/23  .    MEDICAL DECISION MAKING:  PROBLEMS EVALUATED THIS VISIT:  Flank pain.  The differential is quite large could be from urinary issues such as kidney stone or kidney infection.  GI could be hepatitis some sort of pancreatitis or diverticulitis even though located in the back.  Among others.  As for vascular this could be some sort of weird dissection or bleed as the patient had some leg discomfort and radiation down the leg.  Less like this is gynecologic such as ovarian cyst or torsion.  In addition this could be all musculoskeletal such as disc disease among others.    RISK:  Flank pain in a patient could be a very large differential.  With infectious causes or even surgical causes has high risk of morbidity mortality.  By history and physical the patient does not anything elicited but with her history and physical exam at this point I am concerned about something deeper tissue.  Therefore imaging will be performed.      PLAN:  Discharge Medication List as of 4/24/2023  7:06 PM        Discussed with the patient my concerns and at this point low  likelihood that this is obviously dissection because of the discomfort.  And therefore CT scan was done to look both the abdomen and as well as pelvic structures as well as vascular.  I have asked my partner to discharge the patient already spoken to the patient regarding if negative she is to follow-up with her primary care doctor for further work-up.  And obviously return if symptoms worsen as a negative CT scan could still be missing something that is early and in development.  Patient verbalized understanding CT scan pending.  Followup:  Maribel Long M.D.  [address] [place] [address] [phone]    Schedule an appointment as soon as possible for a visit         CONDITION: Guarded and stable.     FINAL IMPRESSION  1. Pain of lower extremity, unspecified laterality       ED Observation Care

## 2023-04-24 NOTE — PROGRESS NOTES
Subjective:   Hermelinda Mosley is a 72 y.o. female who presents for Abdominal Pain ((R) side, pt states it mostly on her side and sometimes it goes around her hip and into abdominal x 1 wk ), Body Aches, and Fatigue (Pt states the pain makes it hard to sleep )        Abdominal Pain  Chronicity: Reports right lower quadrant abdominal pain intermittently over the past 3 days, reports right-sided flank pain radiating to the right lower quadrant intermittently, states pain is significant enough to present sleeping intermittently. The current episode started in the past 7 days. The onset quality is gradual. The problem occurs intermittently. The problem has been unchanged. The pain is located in the RLQ and right flank. The quality of the pain is aching. The abdominal pain radiates to the RLQ. Associated symptoms include myalgias. Pertinent negatives include no dysuria, fever, frequency, nausea or vomiting. Associated symptoms comments: Reports intermittent fatigue, body aches, reports swelling in the lower extremities which the patient temporally correlates to onset of initiation of statin medication    Reports compliance with chronic anticoagulation medications. Treatments tried: Relative rest. The treatment provided no relief.   Fatigue  Associated symptoms include abdominal pain, fatigue and myalgias. Pertinent negatives include no chills, coughing, fever, nausea, rash, sore throat or vomiting.   PMH:  has a past medical history of Abdominal pain, unspecified site, Alpha 1-antitrypsin PiMS phenotype, Back pain, Bronchitis, Chickenpox, Degeneration of lumbar or lumbosacral intervertebral disc, Diarrhea, DVT (deep venous thrombosis) (HCC), Esophageal reflux, [place] measles, Hernia of unspecified site of abdominal cavity without mention of obstruction or gangrene (repaired), History of colonoscopy (2005=normal), Influenza, Irritable bowel syndrome, Mumps, Pap smear (due), Pneumonia, Recurrent UTI (9/4/2009), Restless  leg syndrome, Screening mammogram (10/30/2008=normal), Superficial thrombophlebitis of left leg (10/20/2017), Swelling (9/4/2009), Tonsillitis, Unspecified hemorrhoids without mention of complication, and Vein, varicose (stripped).    She has no past medical history of Encounter for long-term (current) use of other medications.  MEDS:   Current Outpatient Medications:     rivaroxaban (XARELTO) 10 MG Tab tablet, Take 1 Tablet by mouth with dinner., Disp: 90 Tablet, Rfl: 3    rosuvastatin (CRESTOR) 20 MG Tab, Take 1 Tablet by mouth every evening. For high cholesterol, Disp: 90 Tablet, Rfl: 3    Capsaicin 0.035 % Lotion, Apply a thin film to affected skin 3-4 times a day, Disp: 113 g, Rfl: 5    montelukast (SINGULAIR) 10 MG Tab, Take 1 Tablet by mouth every day. For allergies, itching, burning scalp pain, Disp: 30 Tablet, Rfl: 3    acetaminophen (TYLENOL) 325 MG Tab, Take 650 mg by mouth every four hours as needed., Disp: , Rfl:     triamcinolone acetonide (KENALOG) 0.1 % Cream, Apply thin layer 1-2 x daily to affected areas of the back for up to 4 weeks, the decrease to 3x per week, Disp: 454 g, Rfl: 1    calcium acetate (PHOS-LO) 667 MG Cap, Take 1,334 mg by mouth 3 times a day, with meals., Disp: , Rfl:     Lifitegrast 5 % Solution, 1 Drop by Ophthalmic route 2 Times a Day., Disp: , Rfl:     Multiple Vitamins-Minerals (MULTIVITAMIN PO), Take  by mouth., Disp: , Rfl:     Cholecalciferol (VITAMIN D PO), Take  by mouth., Disp: , Rfl:     Ascorbic Acid (VITAMIN C PO), Take 500 mg by mouth every day., Disp: , Rfl:     omeprazole (PRILOSEC) 20 MG delayed-release capsule, Take 20 mg by mouth every day., Disp: , Rfl:     gabapentin (NEURONTIN) 100 MG Cap, Take 100 mg by mouth 3 times a day. (Patient not taking: Reported on 4/24/2023), Disp: , Rfl:   ALLERGIES: No Known Allergies  SURGHX:   Past Surgical History:   Procedure Laterality Date    CHOLECYSTECTOMY      HERNIA REPAIR      VEIN STRIPPING       SOCHX:  reports that  "she quit smoking about 54 years ago. Her smoking use included cigarettes. She has a 1.00 pack-year smoking history. She has never used smokeless tobacco. She reports current alcohol use. She reports that she does not use drugs.  FH:   Family History   Problem Relation Age of Onset    Hypertension Mother     Diabetes Mother         pre diabetes    Diabetes Father     Hypertension Father     Kidney Disease Father     Respiratory Disease Brother      Review of Systems   Constitutional:  Positive for fatigue and malaise/fatigue. Negative for chills and fever.   HENT:  Negative for sore throat.    Respiratory:  Negative for cough and shortness of breath.    Gastrointestinal:  Positive for abdominal pain. Negative for nausea and vomiting.   Genitourinary:  Positive for flank pain. Negative for dysuria and frequency.   Musculoskeletal:  Positive for myalgias.   Skin:  Negative for rash.   Neurological:  Negative for dizziness.      Objective:   /82   Pulse 80   Temp 37.3 °C (99.1 °F) (Temporal)   Resp 14   Ht 1.575 m (5' 2\")   Wt 75.8 kg (167 lb)   LMP 01/01/2000 (Within Years)   SpO2 99%   BMI 30.54 kg/m²   Physical Exam  Vitals and nursing note reviewed.   Constitutional:       General: She is not in acute distress.     Appearance: She is well-developed.   HENT:      Head: Normocephalic and atraumatic.      Right Ear: External ear normal.      Left Ear: External ear normal.      Nose: Nose normal.      Mouth/Throat:      Mouth: Mucous membranes are moist.   Eyes:      Conjunctiva/sclera: Conjunctivae normal.   Cardiovascular:      Rate and Rhythm: Normal rate.   Pulmonary:      Effort: Pulmonary effort is normal. No respiratory distress.      Breath sounds: Normal breath sounds.   Abdominal:      General: There is no distension.      Tenderness: There is abdominal tenderness in the right lower quadrant. There is right CVA tenderness. Negative signs include Guzman's sign and McBurney's sign. "   Musculoskeletal:         General: Normal range of motion.      Right lower leg: Edema present.   Skin:     General: Skin is warm and dry.   Neurological:      General: No focal deficit present.      Mental Status: She is alert and oriented to person, place, and time. Mental status is at baseline.      Gait: Gait (gait at baseline) normal.   Psychiatric:         Judgment: Judgment normal.         Assessment/Plan:   1. Right lower quadrant abdominal pain    2. Right flank pain  - POCT Urinalysis    3. Myalgia        Medical Decision Making/Course:  In the context of the clinical presentation of right lower quadrant pain with right flank pain with significant physical exam findings and in the further context of the patient's comorbidities as noted there is a clinical concern for acute appendicitis and other intra-abdominal pelvic pathology and/or metabolic or systemic pathology and accordingly emergency department evaluation management is warranted.  Patient requested transport by private vehicle and the [facility] emergency department was advised and the transfer center notified.  In the course of preparing for this visit with review of the pertinent past medical history, recent and past clinic visits, current medications, and performing chart, immunization, medical history and medication reconciliation, and in the further course of obtaining the current history pertinent to the clinic visit today, performing an exam and evaluation, ordering and independently evaluating labs, tests including point-of-care urinalysis, and/or procedures, prescribing any recommended new medications as noted above, providing any pertinent counseling and education and recommending further coordination of care including contact and coordination with transfer center, at least  42 minutes of total time were spent during this encounter.      Please note that this dictation was created using voice recognition software. I have worked  with consultants from the vendor as well as technical experts from [facility] to optimize the interface. I have made every reasonable attempt to correct obvious errors, but I expect that there are errors of grammar and possibly content that I did not discover before finalizing the note.

## 2023-04-25 NOTE — DISCHARGE INSTRUCTIONS
Your test were reassuring.  Your CT scan did not show any abnormalities, or an obvious cause of your pain.  Please follow-up with the recommendations discussed with Dr. Ward.

## 2023-04-25 NOTE — PROGRESS NOTES
ED Observation Progress Note    Date of Service: 04/24/23    Interval History and Interventions  6:48 PM this patient was signed out to me by my partner at 5 PM, to follow-up the results of his CT scan.  For full details of the presentation and chief complaint, see the original history and physical.  She has had a thorough and reassuring evaluation, and the CT angiogram was unremarkable.  She will be discharged in stable condition with follow-up and symptomatic management recommendations as provided by my partner.    Thus this patient is discharged from ED observation at 6:48 PM on 4/24/2023.      Physical Exam  /70   Pulse 68   Temp 36.8 °C (98.3 °F) (Temporal)   Resp 19   Wt 75.5 kg (166 lb 7.2 oz)   LMP 01/01/2000 (Within Years)   SpO2 97%   BMI 30.44 kg/m² .    Constitutional: Awake and alert. Nontoxic  HENT:  Grossly normal  Eyes: Grossly normal  Neck: Normal range of motion  Cardiovascular: Normal heart rate   Thorax & Lungs: No respiratory distress  Abdomen: Nontender  Skin:  No pathologic rash.   Extremities: Well perfused  Psychiatric: Affect normal    Labs  Results for orders placed or performed during the hospital encounter of 04/24/23   CBC WITH DIFFERENTIAL   Result Value Ref Range    WBC 7.0 4.8 - 10.8 K/uL    RBC 5.74 (H) 4.20 - 5.40 M/uL    Hemoglobin 16.5 (H) 12.0 - 16.0 g/dL    Hematocrit 49.7 (H) 37.0 - 47.0 %    MCV 86.6 81.4 - 97.8 fL    MCH 28.7 27.0 - 33.0 pg    MCHC 33.2 (L) 33.6 - 35.0 g/dL    RDW 39.0 35.9 - 50.0 fL    Platelet Count 269 164 - 446 K/uL    MPV 10.1 9.0 - 12.9 fL    Neutrophils-Polys 65.00 44.00 - 72.00 %    Lymphocytes 25.60 22.00 - 41.00 %    Monocytes 8.00 0.00 - 13.40 %    Eosinophils 0.90 0.00 - 6.90 %    Basophils 0.40 0.00 - 1.80 %    Immature Granulocytes 0.10 0.00 - 0.90 %    Nucleated RBC 0.00 /100 WBC    Neutrophils (Absolute) 4.58 2.00 - 7.15 K/uL    Lymphs (Absolute) 1.80 1.00 - 4.80 K/uL    Monos (Absolute) 0.56 0.00 - 0.85 K/uL    Eos (Absolute)  0.06 0.00 - 0.51 K/uL    Baso (Absolute) 0.03 0.00 - 0.12 K/uL    Immature Granulocytes (abs) 0.01 0.00 - 0.11 K/uL    NRBC (Absolute) 0.00 K/uL   COMP METABOLIC PANEL   Result Value Ref Range    Sodium 141 135 - 145 mmol/L    Potassium 3.9 3.6 - 5.5 mmol/L    Chloride 104 96 - 112 mmol/L    Co2 26 20 - 33 mmol/L    Anion Gap 11.0 7.0 - 16.0    Glucose 81 65 - 99 mg/dL    Bun 10 8 - 22 mg/dL    Creatinine 0.89 0.50 - 1.40 mg/dL    Calcium 9.6 8.4 - 10.2 mg/dL    AST(SGOT) 25 12 - 45 U/L    ALT(SGPT) 25 2 - 50 U/L    Alkaline Phosphatase 67 30 - 99 U/L    Total Bilirubin 0.4 0.1 - 1.5 mg/dL    Albumin 4.5 3.2 - 4.9 g/dL    Total Protein 7.3 6.0 - 8.2 g/dL    Globulin 2.8 1.9 - 3.5 g/dL    A-G Ratio 1.6 g/dL   URINALYSIS CULTURE, IF INDICATED    Specimen: Blood   Result Value Ref Range    Color Yellow     Character Clear     Specific Gravity 1.020 <1.035    Ph 6.0 5.0 - 8.0    Glucose Negative Negative mg/dL    Ketones Negative Negative mg/dL    Protein Negative Negative mg/dL    Bilirubin Negative Negative    Nitrite Negative Negative    Leukocyte Esterase Negative Negative    Occult Blood Negative Negative    Micro Urine Req see below    LIPASE   Result Value Ref Range    Lipase 29 7 - 58 U/L   CORRECTED CALCIUM   Result Value Ref Range    Correct Calcium 9.2 8.5 - 10.5 mg/dL   ESTIMATED GFR   Result Value Ref Range    GFR (CKD-EPI) 68 >60 mL/min/1.73 m 2       Radiology  CT-CTA AORTA-RO WITH & W/O-POST PROCESS   Final Result      1.  Normal abdominal CTA with bilateral lower extremity runoff. No significant atherosclerosis in the arteries of the lower extremities.   2.  No acute inflammatory process in the abdomen or pelvis.   3.  Prior cholecystectomy.   4.  Colonic diverticulosis.                  The patient will return for new or worsening symptoms and is stable at the time of discharge.    The patient is referred to a primary physician for blood pressure management, diabetic screening, and for all other  preventative health concerns.    DISPOSITION:  Patient will be discharged home in stable condition.    FOLLOW UP:  Maribel Long M.D.  [address] [place] [address] [phone]    Schedule an appointment as soon as possible for a visit         OUTPATIENT MEDICATIONS:  Discharge Medication List as of 4/24/2023  7:06 PM          Problem List    1. Pain of lower extremity, unspecified laterality          Electronically signed by: Jaswinder Sanchez M.D., 4/24/2023 6:47 PM

## 2023-05-01 ENCOUNTER — OFFICE VISIT (OUTPATIENT)
Dept: MEDICAL GROUP | Facility: PHYSICIAN GROUP | Age: 73
End: 2023-05-01
Payer: MEDICARE

## 2023-05-01 VITALS
WEIGHT: 167.4 LBS | RESPIRATION RATE: 18 BRPM | HEART RATE: 67 BPM | OXYGEN SATURATION: 97 % | SYSTOLIC BLOOD PRESSURE: 138 MMHG | BODY MASS INDEX: 30.8 KG/M2 | HEIGHT: 62 IN | DIASTOLIC BLOOD PRESSURE: 84 MMHG | TEMPERATURE: 98 F

## 2023-05-01 DIAGNOSIS — R10.9 RIGHT FLANK PAIN: ICD-10-CM

## 2023-05-01 DIAGNOSIS — K21.00 GASTROESOPHAGEAL REFLUX DISEASE WITH ESOPHAGITIS WITHOUT HEMORRHAGE: ICD-10-CM

## 2023-05-01 PROCEDURE — 99213 OFFICE O/P EST LOW 20 MIN: CPT | Performed by: NURSE PRACTITIONER

## 2023-05-01 RX ORDER — OMEPRAZOLE 20 MG/1
20 CAPSULE, DELAYED RELEASE ORAL DAILY
Qty: 90 CAPSULE | Refills: 3 | Status: SHIPPED | OUTPATIENT
Start: 2023-05-01

## 2023-05-01 ASSESSMENT — PAIN SCALES - GENERAL: PAINLEVEL: 3=SLIGHT PAIN

## 2023-05-01 ASSESSMENT — FIBROSIS 4 INDEX: FIB4 SCORE: 1.34

## 2023-05-07 ASSESSMENT — ENCOUNTER SYMPTOMS
DIARRHEA: 1
FEVER: 0
SHORTNESS OF BREATH: 0
ABDOMINAL PAIN: 1
FLANK PAIN: 1
DIZZINESS: 0
VOMITING: 0
CONSTIPATION: 0
NAUSEA: 0
HEADACHES: 0
CHILLS: 0

## 2023-05-29 ENCOUNTER — APPOINTMENT (OUTPATIENT)
Dept: RADIOLOGY | Facility: MEDICAL CENTER | Age: 73
End: 2023-05-29
Attending: INTERNAL MEDICINE
Payer: MEDICARE

## 2023-05-29 ENCOUNTER — HOSPITAL ENCOUNTER (OUTPATIENT)
Facility: MEDICAL CENTER | Age: 73
End: 2023-05-30
Attending: EMERGENCY MEDICINE | Admitting: INTERNAL MEDICINE
Payer: MEDICARE

## 2023-05-29 ENCOUNTER — APPOINTMENT (OUTPATIENT)
Dept: RADIOLOGY | Facility: MEDICAL CENTER | Age: 73
End: 2023-05-29
Attending: EMERGENCY MEDICINE
Payer: MEDICARE

## 2023-05-29 DIAGNOSIS — M54.9 INTRACTABLE BACK PAIN: ICD-10-CM

## 2023-05-29 DIAGNOSIS — M54.50 ACUTE LEFT-SIDED LOW BACK PAIN WITHOUT SCIATICA: ICD-10-CM

## 2023-05-29 PROBLEM — K59.00 CONSTIPATION: Status: ACTIVE | Noted: 2023-05-29

## 2023-05-29 PROBLEM — R33.9 URINARY RETENTION: Status: ACTIVE | Noted: 2023-05-29

## 2023-05-29 LAB
ALBUMIN SERPL BCP-MCNC: 4 G/DL (ref 3.2–4.9)
ALBUMIN/GLOB SERPL: 1.7 G/DL
ALP SERPL-CCNC: 60 U/L (ref 30–99)
ALT SERPL-CCNC: 24 U/L (ref 2–50)
ANION GAP SERPL CALC-SCNC: 11 MMOL/L (ref 7–16)
AST SERPL-CCNC: 19 U/L (ref 12–45)
BASOPHILS # BLD AUTO: 0.4 % (ref 0–1.8)
BASOPHILS # BLD: 0.02 K/UL (ref 0–0.12)
BILIRUB SERPL-MCNC: 0.4 MG/DL (ref 0.1–1.5)
BUN SERPL-MCNC: 16 MG/DL (ref 8–22)
CALCIUM ALBUM COR SERPL-MCNC: 9.4 MG/DL (ref 8.5–10.5)
CALCIUM SERPL-MCNC: 9.4 MG/DL (ref 8.5–10.5)
CHLORIDE SERPL-SCNC: 105 MMOL/L (ref 96–112)
CO2 SERPL-SCNC: 24 MMOL/L (ref 20–33)
CREAT SERPL-MCNC: 0.97 MG/DL (ref 0.5–1.4)
EOSINOPHIL # BLD AUTO: 0.02 K/UL (ref 0–0.51)
EOSINOPHIL NFR BLD: 0.4 % (ref 0–6.9)
ERYTHROCYTE [DISTWIDTH] IN BLOOD BY AUTOMATED COUNT: 39.1 FL (ref 35.9–50)
GFR SERPLBLD CREATININE-BSD FMLA CKD-EPI: 62 ML/MIN/1.73 M 2
GLOBULIN SER CALC-MCNC: 2.4 G/DL (ref 1.9–3.5)
GLUCOSE SERPL-MCNC: 107 MG/DL (ref 65–99)
HCT VFR BLD AUTO: 49.6 % (ref 37–47)
HGB BLD-MCNC: 16.4 G/DL (ref 12–16)
IMM GRANULOCYTES # BLD AUTO: 0.01 K/UL (ref 0–0.11)
IMM GRANULOCYTES NFR BLD AUTO: 0.2 % (ref 0–0.9)
LIPASE SERPL-CCNC: 30 U/L (ref 11–82)
LYMPHOCYTES # BLD AUTO: 0.8 K/UL (ref 1–4.8)
LYMPHOCYTES NFR BLD: 15.6 % (ref 22–41)
MCH RBC QN AUTO: 28.3 PG (ref 27–33)
MCHC RBC AUTO-ENTMCNC: 33.1 G/DL (ref 32.2–35.5)
MCV RBC AUTO: 85.7 FL (ref 81.4–97.8)
MONOCYTES # BLD AUTO: 0.3 K/UL (ref 0–0.85)
MONOCYTES NFR BLD AUTO: 5.8 % (ref 0–13.4)
NEUTROPHILS # BLD AUTO: 3.98 K/UL (ref 1.82–7.42)
NEUTROPHILS NFR BLD: 77.6 % (ref 44–72)
NRBC # BLD AUTO: 0 K/UL
NRBC BLD-RTO: 0 /100 WBC (ref 0–0.2)
PLATELET # BLD AUTO: 239 K/UL (ref 164–446)
PMV BLD AUTO: 9.9 FL (ref 9–12.9)
POTASSIUM SERPL-SCNC: 4.2 MMOL/L (ref 3.6–5.5)
PROT SERPL-MCNC: 6.4 G/DL (ref 6–8.2)
RBC # BLD AUTO: 5.79 M/UL (ref 4.2–5.4)
SODIUM SERPL-SCNC: 140 MMOL/L (ref 135–145)
WBC # BLD AUTO: 5.1 K/UL (ref 4.8–10.8)

## 2023-05-29 PROCEDURE — 96376 TX/PRO/DX INJ SAME DRUG ADON: CPT

## 2023-05-29 PROCEDURE — 96374 THER/PROPH/DIAG INJ IV PUSH: CPT

## 2023-05-29 PROCEDURE — 72131 CT LUMBAR SPINE W/O DYE: CPT

## 2023-05-29 PROCEDURE — 72146 MRI CHEST SPINE W/O DYE: CPT

## 2023-05-29 PROCEDURE — 99223 1ST HOSP IP/OBS HIGH 75: CPT | Performed by: INTERNAL MEDICINE

## 2023-05-29 PROCEDURE — 84443 ASSAY THYROID STIM HORMONE: CPT

## 2023-05-29 PROCEDURE — 700111 HCHG RX REV CODE 636 W/ 250 OVERRIDE (IP): Performed by: EMERGENCY MEDICINE

## 2023-05-29 PROCEDURE — A9270 NON-COVERED ITEM OR SERVICE: HCPCS | Performed by: NURSE PRACTITIONER

## 2023-05-29 PROCEDURE — G0378 HOSPITAL OBSERVATION PER HR: HCPCS

## 2023-05-29 PROCEDURE — 700102 HCHG RX REV CODE 250 W/ 637 OVERRIDE(OP): Performed by: NURSE PRACTITIONER

## 2023-05-29 PROCEDURE — 700102 HCHG RX REV CODE 250 W/ 637 OVERRIDE(OP): Performed by: INTERNAL MEDICINE

## 2023-05-29 PROCEDURE — A9270 NON-COVERED ITEM OR SERVICE: HCPCS | Performed by: INTERNAL MEDICINE

## 2023-05-29 PROCEDURE — 700105 HCHG RX REV CODE 258: Performed by: INTERNAL MEDICINE

## 2023-05-29 PROCEDURE — 72148 MRI LUMBAR SPINE W/O DYE: CPT

## 2023-05-29 PROCEDURE — 700105 HCHG RX REV CODE 258: Performed by: EMERGENCY MEDICINE

## 2023-05-29 PROCEDURE — 700111 HCHG RX REV CODE 636 W/ 250 OVERRIDE (IP): Performed by: INTERNAL MEDICINE

## 2023-05-29 PROCEDURE — 99285 EMERGENCY DEPT VISIT HI MDM: CPT

## 2023-05-29 PROCEDURE — 96375 TX/PRO/DX INJ NEW DRUG ADDON: CPT

## 2023-05-29 PROCEDURE — 36415 COLL VENOUS BLD VENIPUNCTURE: CPT

## 2023-05-29 PROCEDURE — 85025 COMPLETE CBC W/AUTO DIFF WBC: CPT

## 2023-05-29 PROCEDURE — 80053 COMPREHEN METABOLIC PANEL: CPT

## 2023-05-29 PROCEDURE — 51798 US URINE CAPACITY MEASURE: CPT

## 2023-05-29 PROCEDURE — 700101 HCHG RX REV CODE 250: Performed by: INTERNAL MEDICINE

## 2023-05-29 PROCEDURE — 83690 ASSAY OF LIPASE: CPT

## 2023-05-29 RX ORDER — ONDANSETRON 2 MG/ML
4 INJECTION INTRAMUSCULAR; INTRAVENOUS EVERY 4 HOURS PRN
Status: DISCONTINUED | OUTPATIENT
Start: 2023-05-29 | End: 2023-05-30 | Stop reason: HOSPADM

## 2023-05-29 RX ORDER — POLYETHYLENE GLYCOL 3350 17 G/17G
1 POWDER, FOR SOLUTION ORAL
Status: DISCONTINUED | OUTPATIENT
Start: 2023-05-29 | End: 2023-05-30 | Stop reason: HOSPADM

## 2023-05-29 RX ORDER — ACETAMINOPHEN 500 MG
1000 TABLET ORAL EVERY 6 HOURS PRN
Status: DISCONTINUED | OUTPATIENT
Start: 2023-06-03 | End: 2023-05-30 | Stop reason: HOSPADM

## 2023-05-29 RX ORDER — PROCHLORPERAZINE EDISYLATE 5 MG/ML
10 INJECTION INTRAMUSCULAR; INTRAVENOUS EVERY 6 HOURS PRN
Status: DISCONTINUED | OUTPATIENT
Start: 2023-05-29 | End: 2023-05-30 | Stop reason: HOSPADM

## 2023-05-29 RX ORDER — ONDANSETRON 2 MG/ML
4 INJECTION INTRAMUSCULAR; INTRAVENOUS ONCE
Status: COMPLETED | OUTPATIENT
Start: 2023-05-29 | End: 2023-05-29

## 2023-05-29 RX ORDER — AMOXICILLIN 250 MG
2 CAPSULE ORAL 2 TIMES DAILY
Status: DISCONTINUED | OUTPATIENT
Start: 2023-05-29 | End: 2023-05-30 | Stop reason: HOSPADM

## 2023-05-29 RX ORDER — OMEPRAZOLE 20 MG/1
20 CAPSULE, DELAYED RELEASE ORAL DAILY
Status: DISCONTINUED | OUTPATIENT
Start: 2023-05-29 | End: 2023-05-29

## 2023-05-29 RX ORDER — CALCIUM CARBONATE 500 MG/1
500 TABLET, CHEWABLE ORAL 3 TIMES DAILY PRN
Status: DISCONTINUED | OUTPATIENT
Start: 2023-05-29 | End: 2023-05-30 | Stop reason: HOSPADM

## 2023-05-29 RX ORDER — LIDOCAINE 50 MG/G
1 PATCH TOPICAL EVERY 24 HOURS
Status: DISCONTINUED | OUTPATIENT
Start: 2023-05-29 | End: 2023-05-30 | Stop reason: HOSPADM

## 2023-05-29 RX ORDER — ACETAMINOPHEN 500 MG
1000 TABLET ORAL EVERY 6 HOURS
Status: DISCONTINUED | OUTPATIENT
Start: 2023-05-29 | End: 2023-05-30 | Stop reason: HOSPADM

## 2023-05-29 RX ORDER — MORPHINE SULFATE 4 MG/ML
4 INJECTION INTRAVENOUS ONCE
Status: COMPLETED | OUTPATIENT
Start: 2023-05-29 | End: 2023-05-29

## 2023-05-29 RX ORDER — OXYCODONE HYDROCHLORIDE 10 MG/1
10 TABLET ORAL
Status: DISCONTINUED | OUTPATIENT
Start: 2023-05-29 | End: 2023-05-30 | Stop reason: HOSPADM

## 2023-05-29 RX ORDER — CELECOXIB 100 MG/1
200 CAPSULE ORAL 2 TIMES DAILY PRN
Status: DISCONTINUED | OUTPATIENT
Start: 2023-06-03 | End: 2023-05-29

## 2023-05-29 RX ORDER — BACLOFEN 10 MG/1
10 TABLET ORAL 3 TIMES DAILY PRN
Status: DISCONTINUED | OUTPATIENT
Start: 2023-05-29 | End: 2023-05-30 | Stop reason: HOSPADM

## 2023-05-29 RX ORDER — SODIUM CHLORIDE 9 MG/ML
INJECTION, SOLUTION INTRAVENOUS CONTINUOUS
Status: DISCONTINUED | OUTPATIENT
Start: 2023-05-29 | End: 2023-05-30 | Stop reason: HOSPADM

## 2023-05-29 RX ORDER — BISACODYL 10 MG
10 SUPPOSITORY, RECTAL RECTAL
Status: DISCONTINUED | OUTPATIENT
Start: 2023-05-29 | End: 2023-05-30 | Stop reason: HOSPADM

## 2023-05-29 RX ORDER — SODIUM CHLORIDE, SODIUM LACTATE, POTASSIUM CHLORIDE, CALCIUM CHLORIDE 600; 310; 30; 20 MG/100ML; MG/100ML; MG/100ML; MG/100ML
1000 INJECTION, SOLUTION INTRAVENOUS ONCE
Status: COMPLETED | OUTPATIENT
Start: 2023-05-29 | End: 2023-05-29

## 2023-05-29 RX ORDER — CELECOXIB 100 MG/1
200 CAPSULE ORAL 2 TIMES DAILY
Status: DISCONTINUED | OUTPATIENT
Start: 2023-05-29 | End: 2023-05-29

## 2023-05-29 RX ORDER — OMEPRAZOLE 20 MG/1
20 CAPSULE, DELAYED RELEASE ORAL 2 TIMES DAILY
Status: DISCONTINUED | OUTPATIENT
Start: 2023-05-30 | End: 2023-05-29

## 2023-05-29 RX ORDER — HYDROMORPHONE HYDROCHLORIDE 1 MG/ML
0.5 INJECTION, SOLUTION INTRAMUSCULAR; INTRAVENOUS; SUBCUTANEOUS
Status: DISCONTINUED | OUTPATIENT
Start: 2023-05-29 | End: 2023-05-30 | Stop reason: HOSPADM

## 2023-05-29 RX ORDER — ROSUVASTATIN CALCIUM 20 MG/1
20 TABLET, COATED ORAL EVERY EVENING
Status: DISCONTINUED | OUTPATIENT
Start: 2023-05-29 | End: 2023-05-29

## 2023-05-29 RX ORDER — MONTELUKAST SODIUM 10 MG/1
10 TABLET ORAL DAILY
Status: DISCONTINUED | OUTPATIENT
Start: 2023-05-29 | End: 2023-05-29

## 2023-05-29 RX ORDER — GABAPENTIN 100 MG/1
100 CAPSULE ORAL 3 TIMES DAILY
Status: DISCONTINUED | OUTPATIENT
Start: 2023-05-29 | End: 2023-05-30 | Stop reason: HOSPADM

## 2023-05-29 RX ORDER — OMEPRAZOLE 20 MG/1
20 CAPSULE, DELAYED RELEASE ORAL 2 TIMES DAILY
Status: DISCONTINUED | OUTPATIENT
Start: 2023-05-29 | End: 2023-05-30 | Stop reason: HOSPADM

## 2023-05-29 RX ORDER — LABETALOL HYDROCHLORIDE 5 MG/ML
10 INJECTION, SOLUTION INTRAVENOUS EVERY 4 HOURS PRN
Status: DISCONTINUED | OUTPATIENT
Start: 2023-05-29 | End: 2023-05-30 | Stop reason: HOSPADM

## 2023-05-29 RX ORDER — MORPHINE SULFATE 4 MG/ML
2 INJECTION INTRAVENOUS ONCE
Status: COMPLETED | OUTPATIENT
Start: 2023-05-29 | End: 2023-05-29

## 2023-05-29 RX ORDER — ONDANSETRON 4 MG/1
4 TABLET, ORALLY DISINTEGRATING ORAL EVERY 4 HOURS PRN
Status: DISCONTINUED | OUTPATIENT
Start: 2023-05-29 | End: 2023-05-30 | Stop reason: HOSPADM

## 2023-05-29 RX ORDER — ALPRAZOLAM 0.25 MG/1
0.25 TABLET ORAL
Status: DISCONTINUED | OUTPATIENT
Start: 2023-05-29 | End: 2023-05-30 | Stop reason: HOSPADM

## 2023-05-29 RX ORDER — OXYCODONE HYDROCHLORIDE 5 MG/1
5 TABLET ORAL
Status: DISCONTINUED | OUTPATIENT
Start: 2023-05-29 | End: 2023-05-30 | Stop reason: HOSPADM

## 2023-05-29 RX ADMIN — GABAPENTIN 100 MG: 100 CAPSULE ORAL at 19:24

## 2023-05-29 RX ADMIN — ANTACID TABLETS 500 MG: 500 TABLET, CHEWABLE ORAL at 23:52

## 2023-05-29 RX ADMIN — OXYCODONE 5 MG: 5 TABLET ORAL at 17:16

## 2023-05-29 RX ADMIN — ONDANSETRON 4 MG: 2 INJECTION INTRAMUSCULAR; INTRAVENOUS at 17:12

## 2023-05-29 RX ADMIN — PROCHLORPERAZINE EDISYLATE 10 MG: 5 INJECTION INTRAMUSCULAR; INTRAVENOUS at 19:41

## 2023-05-29 RX ADMIN — ONDANSETRON 4 MG: 2 INJECTION INTRAMUSCULAR; INTRAVENOUS at 13:30

## 2023-05-29 RX ADMIN — ACETAMINOPHEN 1000 MG: 500 TABLET, FILM COATED ORAL at 23:52

## 2023-05-29 RX ADMIN — MORPHINE SULFATE 2 MG: 4 INJECTION INTRAVENOUS at 13:24

## 2023-05-29 RX ADMIN — OMEPRAZOLE 20 MG: 20 CAPSULE, DELAYED RELEASE ORAL at 17:51

## 2023-05-29 RX ADMIN — ACETAMINOPHEN 1000 MG: 500 TABLET, FILM COATED ORAL at 19:24

## 2023-05-29 RX ADMIN — SODIUM CHLORIDE: 9 INJECTION, SOLUTION INTRAVENOUS at 19:33

## 2023-05-29 RX ADMIN — RIVAROXABAN 10 MG: 10 TABLET, FILM COATED ORAL at 19:24

## 2023-05-29 RX ADMIN — SENNOSIDES AND DOCUSATE SODIUM 2 TABLET: 50; 8.6 TABLET ORAL at 19:24

## 2023-05-29 RX ADMIN — SODIUM CHLORIDE, POTASSIUM CHLORIDE, SODIUM LACTATE AND CALCIUM CHLORIDE 1000 ML: 600; 310; 30; 20 INJECTION, SOLUTION INTRAVENOUS at 14:53

## 2023-05-29 RX ADMIN — MORPHINE SULFATE 4 MG: 4 INJECTION, SOLUTION INTRAMUSCULAR; INTRAVENOUS at 10:30

## 2023-05-29 RX ADMIN — LIDOCAINE 1 PATCH: 50 PATCH TOPICAL at 19:41

## 2023-05-29 ASSESSMENT — LIFESTYLE VARIABLES
EVER FELT BAD OR GUILTY ABOUT YOUR DRINKING: NO
HAVE PEOPLE ANNOYED YOU BY CRITICIZING YOUR DRINKING: NO
HOW MANY TIMES IN THE PAST YEAR HAVE YOU HAD 5 OR MORE DRINKS IN A DAY: 0
TOTAL SCORE: 0
ALCOHOL_USE: NO
AVERAGE NUMBER OF DAYS PER WEEK YOU HAVE A DRINK CONTAINING ALCOHOL: 0
TOTAL SCORE: 0
CONSUMPTION TOTAL: NEGATIVE
ON A TYPICAL DAY WHEN YOU DRINK ALCOHOL HOW MANY DRINKS DO YOU HAVE: 0
HAVE YOU EVER FELT YOU SHOULD CUT DOWN ON YOUR DRINKING: NO
EVER HAD A DRINK FIRST THING IN THE MORNING TO STEADY YOUR NERVES TO GET RID OF A HANGOVER: NO
TOTAL SCORE: 0

## 2023-05-29 ASSESSMENT — PAIN DESCRIPTION - PAIN TYPE: TYPE: ACUTE PAIN

## 2023-05-29 ASSESSMENT — FIBROSIS 4 INDEX
FIB4 SCORE: 1.34
FIB4 SCORE: 1.17

## 2023-05-29 ASSESSMENT — ENCOUNTER SYMPTOMS
CONSTIPATION: 1
ABDOMINAL PAIN: 0
DEPRESSION: 1
SHORTNESS OF BREATH: 0
CHILLS: 0
HEADACHES: 0
WEAKNESS: 1
MYALGIAS: 0
BLURRED VISION: 0
DIZZINESS: 0
VOMITING: 1
NAUSEA: 1
HEARTBURN: 1
BACK PAIN: 1
COUGH: 0
FEVER: 0

## 2023-05-29 ASSESSMENT — PATIENT HEALTH QUESTIONNAIRE - PHQ9
1. LITTLE INTEREST OR PLEASURE IN DOING THINGS: NOT AT ALL
2. FEELING DOWN, DEPRESSED, IRRITABLE, OR HOPELESS: NOT AT ALL
SUM OF ALL RESPONSES TO PHQ9 QUESTIONS 1 AND 2: 0

## 2023-05-29 NOTE — PROGRESS NOTES
[id number] Patient arrived to CDU with transport. Moved patient from [facility] to bed with slide board. Waiting for medications to be approved from pharmacy. Patient c/o 6/10 pain and nausea. Daughter stated that patient has not urinated since yesterday night. Bladder scan 326 updated MD guevara placed no orders right now. Plan for MRI today, completed screening.

## 2023-05-29 NOTE — ED NOTES
Med rec completed per patient and family at bedside  Allergies reviewed  No PO Antibiotics in the last 30 days     Patient states she stopped taking rosuvastatin on May 1st after consulting her doctor as she believed the medication was what was causing her pain.

## 2023-05-29 NOTE — ASSESSMENT & PLAN NOTE
Patient denies dysuria but reports she has been unable to urinate since last night  Bladder scan, straight cath as needed  UA pending

## 2023-05-29 NOTE — ASSESSMENT & PLAN NOTE
Reports last bowel movement was on Saturday and very small, normally has daily bowel movements  Bowel protocol

## 2023-05-29 NOTE — ED PROVIDER NOTES
"CHIEF COMPLAINT  Chief Complaint   Patient presents with    Flank Pain     Pt reports \"burning\" R flank x1 month, seen in ER for same. Today started having L flank pain that radiates to lower back and groin. Last BM 2 days ago. Loss of appetite.        LIMITATION TO HISTORY   Select: None    HPI    Hermelinda Mosley is a 72 y.o. female who presents to the Emergency Department complaining of left-sided left flank pain.  The patient was seen here a month ago for right-sided back pain.  She did have a full work-up at [facility] including a CT scan of the abdominal aorta.  The patient was then sent home she states the pain lingered for about 5 more days after that then did resolve mostly.  The patient was doing some outdoor work on Wednesday with some weeding in her right side actually was feeling better after that however on Friday she did some other outdoor work and then Saturday when she woke up she started having some increasing pain in the left side of her back.  Last 2 days for Saturday and Sunday the pains got worse this morning when she woke up she just could not get out of bed she had significant pain located the left lower part of her back with minimal radiation to the left leg she stated that there was a point where the pain came to the anterior aspect of her leg but it seemed to have resolved.  The patient was unable to get up and move and get out of bed so they called an ambulance.  The patient was given 75 mcg of fentanyl prior to arrival and is here for evaluation.  She denies any fevers chills nausea vomit chest pain shortness of breath or any other symptoms.    OUTSIDE HISTORIAN(S):  Select: Granddaughter at bedside also provided some history    EXTERNAL RECORDS REVIEWED  Select: Other patient was seen on 424 reviewed for evaluation below is a CT scan  CTA aorta with runoff 4/24  IMPRESSION:     1.  Normal abdominal CTA with bilateral lower extremity runoff. No significant atherosclerosis in the " arteries of the lower extremities.  2.  No acute inflammatory process in the abdomen or pelvis.  3.  Prior cholecystectomy.  4.  Colonic diverticulosis.         PAST MEDICAL HISTORY  Past Medical History:   Diagnosis Date    Abdominal pain, unspecified site     Alpha 1-antitrypsin PiMS phenotype     Back pain     Bronchitis     Chickenpox     Degeneration of lumbar or lumbosacral intervertebral disc     Diarrhea     DVT (deep venous thrombosis) (HCC)     Esophageal reflux     Greek measles     Hernia of unspecified site of abdominal cavity without mention of obstruction or gangrene repaired    History of colonoscopy =normal    Dr. Benjamin[facility]    Influenza     Irritable bowel syndrome     Mumps     Pap smear due    Pneumonia     Recurrent UTI 2009    Restless leg syndrome     Screening mammogram 10/30/2008=normal    Superficial thrombophlebitis of left leg 10/20/2017    Swelling 2009    Tonsillitis     Unspecified hemorrhoids without mention of complication     Vein, varicose stripped     .    SURGICAL HISTORY  Past Surgical History:   Procedure Laterality Date    CHOLECYSTECTOMY      HERNIA REPAIR      VEIN STRIPPING           FAMILY HISTORY  Family History   Problem Relation Age of Onset    Hypertension Mother     Diabetes Mother         pre diabetes    Diabetes Father     Hypertension Father     Kidney Disease Father     Respiratory Disease Brother           SOCIAL HISTORY  Social History     Socioeconomic History    Marital status:      Spouse name: Not on file    Number of children: Not on file    Years of education: Not on file    Highest education level: Not on file   Occupational History    Not on file   Tobacco Use    Smoking status: Former     Packs/day: 0.25     Years: 4.00     Pack years: 1.00     Types: Cigarettes     Quit date: 1969     Years since quittin.4    Smokeless tobacco: Never    Tobacco comments:     only smokes 1-2 cigs a couple times a week for 1-2  "years   Vaping Use    Vaping Use: Never used   Substance and Sexual Activity    Alcohol use: Yes     Alcohol/week: 0.0 - 1.2 oz     Comment: occ    Drug use: No    Sexual activity: Yes     Partners: Male     Comment:    Other Topics Concern    Not on file   Social History Narrative    Not on file     Social Determinants of Health     Financial Resource Strain: Not on file   Food Insecurity: Not on file   Transportation Needs: Not on file   Physical Activity: Not on file   Stress: Not on file   Social Connections: Not on file   Intimate Partner Violence: Not on file   Housing Stability: Not on file         CURRENT MEDICATIONS  No current facility-administered medications on file prior to encounter.     Current Outpatient Medications on File Prior to Encounter   Medication Sig Dispense Refill    CALCIUM PO Take 1 Tablet by mouth every day.      omeprazole (PRILOSEC) 20 MG delayed-release capsule Take 1 Capsule by mouth every day. 90 Capsule 3    rivaroxaban (XARELTO) 10 MG Tab tablet Take 1 Tablet by mouth with dinner. 90 Tablet 3    rosuvastatin (CRESTOR) 20 MG Tab Take 1 Tablet by mouth every evening. For high cholesterol 90 Tablet 3    Multiple Vitamins-Minerals (MULTIVITAMIN PO) Take 1 Tablet by mouth every day.      Cholecalciferol (VITAMIN D PO) Take  by mouth.             ALLERGIES  No Known Allergies    PHYSICAL EXAM  VITAL SIGNS:BP (!) 167/77   Pulse 78   Temp 36.2 °C (97.2 °F) (Temporal)   Resp 18   Ht 1.575 m (5' 2\")   Wt 78.5 kg (173 lb 1 oz)   LMP 01/01/2000 (Within Years)   SpO2 95%   BMI 31.65 kg/m²     Constitutional: Well-developed no acute distress does have significant pain when trying to move and cannot set up  HENT: Normocephalic, Atraumatic, Bilateral external ears normal.  Eyes:  conjunctiva are normal.   Neck: Supple.  Nontender midline  Cardiovascular: Regular rate and rhythm without murmurs gallops or rubs.   Thorax & Lungs: No respiratory distress. Breathing comfortably. Lungs " are clear to auscultation bilaterally, there are no wheezes no rales. Chest wall is nontender.  Abdomen: Soft, nontender nondistended.  Skin: Warm, Dry, No erythema,   Back: Under about the lower lumbar area in the left paraspinous musculature there is no tenderness within the sciatic region.  Musculoskeletal: No clubbing cyanosis or edema good range of motion with elevation of the legs she does have spasms in the lower back but no numbness tingling in the leg itself.  Neurologic: Alert & oriented x 3, normal sensation moving all extremities appears normal sensations grossly intact  Psychiatric: Affect normal, Judgment normal, Mood normal.       DIAGNOSTIC STUDIES / PROCEDURES      LABS  Results for orders placed or performed during the hospital encounter of 05/29/23   CBC with Differential   Result Value Ref Range    WBC 5.1 4.8 - 10.8 K/uL    RBC 5.79 (H) 4.20 - 5.40 M/uL    Hemoglobin 16.4 (H) 12.0 - 16.0 g/dL    Hematocrit 49.6 (H) 37.0 - 47.0 %    MCV 85.7 81.4 - 97.8 fL    MCH 28.3 27.0 - 33.0 pg    MCHC 33.1 32.2 - 35.5 g/dL    RDW 39.1 35.9 - 50.0 fL    Platelet Count 239 164 - 446 K/uL    MPV 9.9 9.0 - 12.9 fL    Neutrophils-Polys 77.60 (H) 44.00 - 72.00 %    Lymphocytes 15.60 (L) 22.00 - 41.00 %    Monocytes 5.80 0.00 - 13.40 %    Eosinophils 0.40 0.00 - 6.90 %    Basophils 0.40 0.00 - 1.80 %    Immature Granulocytes 0.20 0.00 - 0.90 %    Nucleated RBC 0.00 0.00 - 0.20 /100 WBC    Neutrophils (Absolute) 3.98 1.82 - 7.42 K/uL    Lymphs (Absolute) 0.80 (L) 1.00 - 4.80 K/uL    Monos (Absolute) 0.30 0.00 - 0.85 K/uL    Eos (Absolute) 0.02 0.00 - 0.51 K/uL    Baso (Absolute) 0.02 0.00 - 0.12 K/uL    Immature Granulocytes (abs) 0.01 0.00 - 0.11 K/uL    NRBC (Absolute) 0.00 K/uL   Complete Metabolic Panel   Result Value Ref Range    Sodium 140 135 - 145 mmol/L    Potassium 4.2 3.6 - 5.5 mmol/L    Chloride 105 96 - 112 mmol/L    Co2 24 20 - 33 mmol/L    Anion Gap 11.0 7.0 - 16.0    Glucose 107 (H) 65 - 99 mg/dL     Bun 16 8 - 22 mg/dL    Creatinine 0.97 0.50 - 1.40 mg/dL    Calcium 9.4 8.5 - 10.5 mg/dL    AST(SGOT) 19 12 - 45 U/L    ALT(SGPT) 24 2 - 50 U/L    Alkaline Phosphatase 60 30 - 99 U/L    Total Bilirubin 0.4 0.1 - 1.5 mg/dL    Albumin 4.0 3.2 - 4.9 g/dL    Total Protein 6.4 6.0 - 8.2 g/dL    Globulin 2.4 1.9 - 3.5 g/dL    A-G Ratio 1.7 g/dL   Lipase   Result Value Ref Range    Lipase 30 11 - 82 U/L   CORRECTED CALCIUM   Result Value Ref Range    Correct Calcium 9.4 8.5 - 10.5 mg/dL   ESTIMATED GFR   Result Value Ref Range    GFR (CKD-EPI) 62 >60 mL/min/1.73 m 2         RADIOLOGY  I have independently interpreted the diagnostic imaging associated with this visit and am waiting the final reading from the radiologist.   My preliminary interpretation is as follows: No acute fracture seen      Radiologist interpretation:   CT-LSPINE W/O PLUS RECONS   Final Result      Mild to moderate lumbar spondylosis. No acute fracture or traumatic listhesis.      MR-LUMBAR SPINE-W/O    (Results Pending)   MR-THORACIC SPINE-W/O    (Results Pending)        COURSE & MEDICAL DECISION MAKING    ED COURSE:    ED Observation Status? Yes; Patient placed in observation status at 10:18 AM 5/29/2023    Observation plan is as follows: IV is in place patient will be given morphine for pain control.  We will do a CT scan to look at the lumbar spine itself.  Concerns are for spinal fracture renal stone I will get a urinalysis and basic blood test.    INTERVENTIONS BY ME:  Medications   gabapentin (NEURONTIN) capsule 100 mg (has no administration in time range)   Pharmacy Consult Request ...Pain Management Review 1 Each (has no administration in time range)   acetaminophen (TYLENOL) tablet 1,000 mg (has no administration in time range)     Followed by   acetaminophen (TYLENOL) tablet 1,000 mg (has no administration in time range)   oxyCODONE immediate-release (ROXICODONE) tablet 5 mg (has no administration in time range)     Or   oxyCODONE immediate  release (ROXICODONE) tablet 10 mg (has no administration in time range)     Or   HYDROmorphone (Dilaudid) injection 0.5 mg (has no administration in time range)   baclofen (LIORESAL) tablet 10 mg (has no administration in time range)   omeprazole (PRILOSEC) capsule 20 mg (has no administration in time range)   senna-docusate (PERICOLACE or SENOKOT S) 8.6-50 MG per tablet 2 Tablet (has no administration in time range)     And   polyethylene glycol/lytes (MIRALAX) PACKET 1 Packet (has no administration in time range)     And   magnesium hydroxide (MILK OF MAGNESIA) suspension 30 mL (has no administration in time range)     And   bisacodyl (DULCOLAX) suppository 10 mg (has no administration in time range)   NS infusion (has no administration in time range)   labetalol (NORMODYNE/TRANDATE) injection 10 mg (has no administration in time range)   ondansetron (ZOFRAN) syringe/vial injection 4 mg (has no administration in time range)   ondansetron (ZOFRAN ODT) dispertab 4 mg (has no administration in time range)   prochlorperazine (COMPAZINE) injection 10 mg (has no administration in time range)   lidocaine (LIDODERM) 5 % 1 Patch (has no administration in time range)   rivaroxaban (XARELTO) tablet 10 mg (has no administration in time range)   ALPRAZolam (XANAX) tablet 0.25 mg (has no administration in time range)   morphine 4 MG/ML injection 4 mg (4 mg Intravenous Given 5/29/23 1030)   morphine 4 MG/ML injection 2 mg (2 mg Intravenous Given 5/29/23 1324)   ondansetron (ZOFRAN) syringe/vial injection 4 mg (4 mg Intravenous Given 5/29/23 1330)   lactated ringers (LR) bolus (0 mL Intravenous Stopped 5/29/23 1500)       Response on recheck: Patient is had multiple doses of pain medications and is unable to stand and unable to move..      I have discussed management of the patient with the following physicians and sources:   Hospitalist for admission    Patient discharged from ED observation at 3:30 PM 05/29/23 escalation of  care to admission.      INITIAL ASSESSMENT, COURSE AND PLAN  Care Narrative: Patient presents for evaluation.  The patient does have left-sided flank pain which is most likely musculoskeletal in nature however a renal stone is a possibility.  The patient currently is taking Xarelto because of factor V Leyden deficiency.  There is also might be concern of the possibility of a psoas hematoma which will be seen on CT scan as well.  Laboratory studies were drawn there is no significant abnormalities urinalysis is normal.  CT shows no acute fractures.  Patient has been initially given 75 mcg of fentanyl then was given a total of 8 mg of morphine and 3 separate doses during the hospital course for pain control.  The patient is still unable to get up and ambulate.  At this point I do feel its mostly musculoskeletal causing the pain however because of her inability to ambulate do for the patient will require admission for observation and further evaluation.  I have spoken to the hospitalist for this admission.                MEDICAL DECISION MAKING:  PROBLEMS EVALUATED THIS VISIT:  Back pain    RISK:  Due to the multitude of potential emergent concerns for back pain the patient was evaluated with the above laboratory studies CT scan.  It was found that the patient had continued pain and required admission to the hospital.      FINAL DIAGNOSIS  1. Acute left-sided low back pain without sciatica             Electronically signed by: Tavares Frederick M.D.,5:10 PM 05/29/23

## 2023-05-29 NOTE — ASSESSMENT & PLAN NOTE
Unable to walk despite given IV morphine and fentanyl  MRI T and L-spine pending  CT L-spine showed no acute findings  Multimodal pain control, continue home gabapentin  Scheduled Tylenol  Lidocaine patch  As needed opioids  Baclofen as needed  PT/OT

## 2023-05-29 NOTE — ED TRIAGE NOTES
"Chief Complaint   Patient presents with    Flank Pain     Pt reports \"burning\" R flank x1 month, seen in ER for same. Today started having L flank pain that radiates to lower back and groin. Last BM 2 days ago. Loss of appetite.      /63   Pulse 60   Temp 36.4 °C (97.6 °F) (Temporal)   Resp 18   Ht 1.575 m (5' 2\")   Wt 75.8 kg (167 lb)   LMP 01/01/2000 (Within Years)   SpO2 92%   BMI 30.54 kg/m²     Pt brought in by EMS from [place] to [facility]. Pt in a gown and on monitor. Chart flagged for ERP to see.    PIV established, 75 mcg fentanyl pta.  "

## 2023-05-29 NOTE — H&P
"Hospital Medicine History & Physical Note    Date of Service  5/29/2023    Primary Care Physician  Maribel Long M.D.    Consultants  N/A    Code Status  Full Code    Chief Complaint  Chief Complaint   Patient presents with    Flank Pain     Pt reports \"burning\" R flank x1 month, seen in ER for same. Today started having L flank pain that radiates to lower back and groin. Last BM 2 days ago. Loss of appetite.        History of Presenting Illness  Hermelinda Mosley is a 72 y.o. female who presented 5/29/2023 with back pain.  Patient reports about a month ago she started having right-sided flank pain that was at her lower ribs that radiated from her back forward and was tender to palpation.  Daughter and patient think it started after she tried to help of her son who unfortunately has frontotemporal dementia.  She was seen in [facility] ER at that time and work-up was negative.  She continued to have this pain however has been unable to get into her primary care physician.  She reports that it started to improve and on Friday she did some outdoor work, starting Saturday she had a left lower back pain.  Yesterday was worse and this morning it was so bad she could not get out of bed.  She denies any numbness or tingling to her legs.  She does report that she has been constipated since Saturday and has not been able to urinate since last night.  Despite IV pain meds given by EMS and ERP patient still in severe pain especially with movement.  She was on rosuvastatin however she stopped this a few weeks ago thinking that could be causing her symptoms.  She denies any fevers, chills, chest pain or shortness of breath.  She does report she has been overall fatigued and not feeling well for the last month.  Patient's daughter reports that she has been under an extreme amount of stress and has been crying a lot due to the issue with her son.  Of note patient also complaining of reflux pain with active nausea and " vomiting at time of my evaluation.  She states she seen 2 different GI doctors 1 of which told her she had esophageal varices and needed to be on omeprazole and the second of which told her she did not have those and can discontinue the medication.  They have been trying to get a third opinion, but have been unable to get an appointment.    In the ER vital signs stable, labs only significant for hemoglobin 16.4 otherwise stable.  CTA showed no acute process in the abdomen or pelvis. CT L-spine showed mild to moderate lumbar spondylosis no acute fracture.  Despite getting fentanyl and 8 mg of IV morphine in the ER patient still having intractable pain and unable to ambulate, hospitalist consulted for admission.    I discussed the plan of care with patient, bedside RN, and ERP .    Review of Systems  Review of Systems   Constitutional:  Positive for malaise/fatigue. Negative for chills and fever.   HENT:  Negative for congestion.    Eyes:  Negative for blurred vision.   Respiratory:  Negative for cough and shortness of breath.    Cardiovascular:  Negative for chest pain.   Gastrointestinal:  Positive for constipation, heartburn, nausea and vomiting. Negative for abdominal pain.   Genitourinary:  Negative for dysuria.        Urinary retention   Musculoskeletal:  Positive for back pain. Negative for myalgias.   Skin:  Negative for rash.   Neurological:  Positive for weakness. Negative for dizziness and headaches.   Psychiatric/Behavioral:  Positive for depression.    All other systems reviewed and are negative.      Past Medical History   has a past medical history of Abdominal pain, unspecified site, Alpha 1-antitrypsin PiMS phenotype, Back pain, Bronchitis, Chickenpox, Degeneration of lumbar or lumbosacral intervertebral disc, Diarrhea, DVT (deep venous thrombosis) (HCC), Esophageal reflux, [place] measles, Hernia of unspecified site of abdominal cavity without mention of obstruction or gangrene (repaired), History of  colonoscopy (=normal), Influenza, Irritable bowel syndrome, Mumps, Pap smear (due), Pneumonia, Recurrent UTI (2009), Restless leg syndrome, Screening mammogram (10/30/2008=normal), Superficial thrombophlebitis of left leg (10/20/2017), Swelling (2009), Tonsillitis, Unspecified hemorrhoids without mention of complication, and Vein, varicose (stripped).    Surgical History   has a past surgical history that includes cholecystectomy; hernia repair; and vein stripping.     Family History  family history includes Diabetes in her father and mother; Hypertension in her father and mother; Kidney Disease in her father; Respiratory Disease in her brother.   Family history reviewed with patient. There is no family history that is pertinent to the chief complaint.     Social History   reports that she quit smoking about 54 years ago. Her smoking use included cigarettes. She has a 1.00 pack-year smoking history. She has never used smokeless tobacco. She reports current alcohol use. She reports that she does not use drugs.    Allergies  No Known Allergies    Medications  Prior to Admission Medications   Prescriptions Last Dose Informant Patient Reported? Taking?   Ascorbic Acid (VITAMIN C PO)   Yes No   Sig: Take 500 mg by mouth every day.   Capsaicin 0.035 % Lotion   No No   Sig: Apply a thin film to affected skin 3-4 times a day   Cholecalciferol (VITAMIN D PO)   Yes No   Sig: Take  by mouth.   Lifitegrast 5 % Solution   Yes No   Si Drop by Ophthalmic route 2 Times a Day.   Multiple Vitamins-Minerals (MULTIVITAMIN PO)   Yes No   Sig: Take  by mouth.   acetaminophen (TYLENOL) 325 MG Tab   Yes No   Sig: Take 650 mg by mouth every four hours as needed.   calcium acetate (PHOS-LO) 667 MG Cap   Yes No   Sig: Take 1,334 mg by mouth 3 times a day, with meals.   gabapentin (NEURONTIN) 100 MG Cap   Yes No   Sig: Take 100 mg by mouth 3 times a day.   montelukast (SINGULAIR) 10 MG Tab   No No   Sig: Take 1 Tablet by mouth  every day. For allergies, itching, burning scalp pain   omeprazole (PRILOSEC) 20 MG delayed-release capsule   No No   Sig: Take 1 Capsule by mouth every day.   rivaroxaban (XARELTO) 10 MG Tab tablet   No No   Sig: Take 1 Tablet by mouth with dinner.   rosuvastatin (CRESTOR) 20 MG Tab   No No   Sig: Take 1 Tablet by mouth every evening. For high cholesterol   triamcinolone acetonide (KENALOG) 0.1 % Cream   No No   Sig: Apply thin layer 1-2 x daily to affected areas of the back for up to 4 weeks, the decrease to 3x per week      Facility-Administered Medications: None       Physical Exam  Temp:  [36.2 °C (97.2 °F)-36.4 °C (97.6 °F)] 36.2 °C (97.2 °F)  Pulse:  [60-78] 78  Resp:  [16-18] 18  BP: (115-167)/(58-77) 167/77  SpO2:  [90 %-96 %] 95 %  Blood Pressure : (!) 153/69   Temperature: 36.4 °C (97.6 °F)   Pulse: 74   Respiration: 16   Pulse Oximetry: 94 %       Physical Exam  Vitals and nursing note reviewed.   Constitutional:       General: She is not in acute distress.     Appearance: She is obese. She is ill-appearing.      Comments: Daughter at bedside    HENT:      Head: Normocephalic and atraumatic.      Right Ear: External ear normal.      Left Ear: External ear normal.      Nose: Nose normal.      Mouth/Throat:      Pharynx: Oropharynx is clear.   Eyes:      Extraocular Movements: Extraocular movements intact.      Conjunctiva/sclera: Conjunctivae normal.      Pupils: Pupils are equal, round, and reactive to light.   Cardiovascular:      Rate and Rhythm: Normal rate and regular rhythm.      Heart sounds: Normal heart sounds.   Pulmonary:      Effort: Pulmonary effort is normal. No respiratory distress.      Breath sounds: Normal breath sounds. No wheezing or rales.   Abdominal:      General: Abdomen is flat. There is no distension.      Palpations: Abdomen is soft.      Tenderness: There is no abdominal tenderness.   Musculoskeletal:         General: Tenderness (left lumbar paraspinal) present. No deformity.       Cervical back: Normal range of motion and neck supple.      Right lower leg: No edema.      Left lower leg: No edema.   Skin:     General: Skin is warm and dry.   Neurological:      General: No focal deficit present.      Mental Status: She is alert and oriented to person, place, and time.      Cranial Nerves: No cranial nerve deficit.      Motor: Weakness (2/2 pain) present.      Comments: Exam limited by pain, lifted LE off bed by 2 in with resulting severe back pain   Psychiatric:         Mood and Affect: Mood is depressed.         Behavior: Behavior normal.         Laboratory:  Recent Labs     05/29/23  1032   WBC 5.1   RBC 5.79*   HEMOGLOBIN 16.4*   HEMATOCRIT 49.6*   MCV 85.7   MCH 28.3   MCHC 33.1   RDW 39.1   PLATELETCT 239   MPV 9.9     Recent Labs     05/29/23  1032   SODIUM 140   POTASSIUM 4.2   CHLORIDE 105   CO2 24   GLUCOSE 107*   BUN 16   CREATININE 0.97   CALCIUM 9.4     Recent Labs     05/29/23  1032   ALTSGPT 24   ASTSGOT 19   ALKPHOSPHAT 60   TBILIRUBIN 0.4   LIPASE 30   GLUCOSE 107*         No results for input(s): NTPROBNP in the last 72 hours.      No results for input(s): TROPONINT in the last 72 hours.    Imaging:  CT-LSPINE W/O PLUS RECONS   Final Result      Mild to moderate lumbar spondylosis. No acute fracture or traumatic listhesis.      MR-LUMBAR SPINE-W/O    (Results Pending)   MR-THORACIC SPINE-W/O    (Results Pending)       no X-Ray or EKG requiring interpretation    Assessment/Plan:  Justification for Admission Status  I anticipate this patient is appropriate for observation status at this time because intractable back pain and unable to ambulate.  Patient will be admitted for pain control and therapy evaluations.    * Intractable back pain- (present on admission)  Assessment & Plan  Unable to walk despite given IV morphine and fentanyl  MRI T and L-spine pending  CT L-spine showed no acute findings  Multimodal pain control, continue home gabapentin  Scheduled  Tylenol  Lidocaine patch  As needed opioids  Baclofen as needed  PT/OT    Urinary retention- (present on admission)  Assessment & Plan  Patient denies dysuria but reports she has been unable to urinate since last night  Bladder scan, straight cath as needed  UA pending    Constipation- (present on admission)  Assessment & Plan  Reports last bowel movement was on Saturday and very small, normally has daily bowel movements  Bowel protocol    Dyslipidemia- (present on admission)  Assessment & Plan  Patient stopped taking her statin due to concern for that causing her muscular pain    Factor V Leiden mutation (HCC)- (present on admission)  Assessment & Plan  Continue home Xarelto    Chronic kidney disease, stage 3a (HCC)- (present on admission)  Assessment & Plan  Stable  Avoid nephrotoxic medications  Daily BMP    Esophageal reflux- (present on admission)  Assessment & Plan  Increase omeprazole to twice daily        VTE prophylaxis: therapeutic anticoagulation with Xarelto

## 2023-05-29 NOTE — ED NOTES
Bedside report received from BEKAH Simpson. Patient Aox4; with LR ongoing on left AC; daughter at bedside; attached to cardiac monitor

## 2023-05-30 VITALS
RESPIRATION RATE: 17 BRPM | WEIGHT: 173.06 LBS | TEMPERATURE: 97.7 F | DIASTOLIC BLOOD PRESSURE: 57 MMHG | BODY MASS INDEX: 31.85 KG/M2 | HEART RATE: 75 BPM | OXYGEN SATURATION: 99 % | HEIGHT: 62 IN | SYSTOLIC BLOOD PRESSURE: 111 MMHG

## 2023-05-30 LAB
ALBUMIN SERPL BCP-MCNC: 3.7 G/DL (ref 3.2–4.9)
ALBUMIN/GLOB SERPL: 1.8 G/DL
ALP SERPL-CCNC: 61 U/L (ref 30–99)
ALT SERPL-CCNC: 42 U/L (ref 2–50)
ANION GAP SERPL CALC-SCNC: 12 MMOL/L (ref 7–16)
APPEARANCE UR: CLEAR
AST SERPL-CCNC: 44 U/L (ref 12–45)
BACTERIA #/AREA URNS HPF: NEGATIVE /HPF
BILIRUB SERPL-MCNC: 0.4 MG/DL (ref 0.1–1.5)
BILIRUB UR QL STRIP.AUTO: NEGATIVE
BUN SERPL-MCNC: 14 MG/DL (ref 8–22)
CALCIUM ALBUM COR SERPL-MCNC: 9.1 MG/DL (ref 8.5–10.5)
CALCIUM SERPL-MCNC: 8.9 MG/DL (ref 8.5–10.5)
CHLORIDE SERPL-SCNC: 104 MMOL/L (ref 96–112)
CO2 SERPL-SCNC: 24 MMOL/L (ref 20–33)
COLOR UR: YELLOW
CREAT SERPL-MCNC: 0.87 MG/DL (ref 0.5–1.4)
EPI CELLS #/AREA URNS HPF: NEGATIVE /HPF
ERYTHROCYTE [DISTWIDTH] IN BLOOD BY AUTOMATED COUNT: 38.7 FL (ref 35.9–50)
GFR SERPLBLD CREATININE-BSD FMLA CKD-EPI: 70 ML/MIN/1.73 M 2
GLOBULIN SER CALC-MCNC: 2.1 G/DL (ref 1.9–3.5)
GLUCOSE SERPL-MCNC: 101 MG/DL (ref 65–99)
GLUCOSE UR STRIP.AUTO-MCNC: NEGATIVE MG/DL
HCT VFR BLD AUTO: 45.9 % (ref 37–47)
HGB BLD-MCNC: 15.2 G/DL (ref 12–16)
HYALINE CASTS #/AREA URNS LPF: ABNORMAL /LPF
KETONES UR STRIP.AUTO-MCNC: ABNORMAL MG/DL
LEUKOCYTE ESTERASE UR QL STRIP.AUTO: ABNORMAL
MCH RBC QN AUTO: 28.4 PG (ref 27–33)
MCHC RBC AUTO-ENTMCNC: 33.1 G/DL (ref 32.2–35.5)
MCV RBC AUTO: 85.6 FL (ref 81.4–97.8)
MICRO URNS: ABNORMAL
NITRITE UR QL STRIP.AUTO: NEGATIVE
PH UR STRIP.AUTO: 7 [PH] (ref 5–8)
PLATELET # BLD AUTO: 243 K/UL (ref 164–446)
PMV BLD AUTO: 10.4 FL (ref 9–12.9)
POTASSIUM SERPL-SCNC: 4 MMOL/L (ref 3.6–5.5)
PROT SERPL-MCNC: 5.8 G/DL (ref 6–8.2)
PROT UR QL STRIP: NEGATIVE MG/DL
RBC # BLD AUTO: 5.36 M/UL (ref 4.2–5.4)
RBC # URNS HPF: ABNORMAL /HPF
RBC UR QL AUTO: NEGATIVE
SODIUM SERPL-SCNC: 140 MMOL/L (ref 135–145)
SP GR UR STRIP.AUTO: 1.02
TSH SERPL DL<=0.005 MIU/L-ACNC: 2.52 UIU/ML (ref 0.38–5.33)
UROBILINOGEN UR STRIP.AUTO-MCNC: 1 MG/DL
WBC # BLD AUTO: 7.2 K/UL (ref 4.8–10.8)
WBC #/AREA URNS HPF: ABNORMAL /HPF

## 2023-05-30 PROCEDURE — 700102 HCHG RX REV CODE 250 W/ 637 OVERRIDE(OP): Performed by: INTERNAL MEDICINE

## 2023-05-30 PROCEDURE — 97166 OT EVAL MOD COMPLEX 45 MIN: CPT

## 2023-05-30 PROCEDURE — 81001 URINALYSIS AUTO W/SCOPE: CPT

## 2023-05-30 PROCEDURE — 99239 HOSP IP/OBS DSCHRG MGMT >30: CPT | Performed by: STUDENT IN AN ORGANIZED HEALTH CARE EDUCATION/TRAINING PROGRAM

## 2023-05-30 PROCEDURE — 80053 COMPREHEN METABOLIC PANEL: CPT

## 2023-05-30 PROCEDURE — 97535 SELF CARE MNGMENT TRAINING: CPT

## 2023-05-30 PROCEDURE — 85027 COMPLETE CBC AUTOMATED: CPT

## 2023-05-30 PROCEDURE — A9270 NON-COVERED ITEM OR SERVICE: HCPCS | Performed by: INTERNAL MEDICINE

## 2023-05-30 PROCEDURE — G0378 HOSPITAL OBSERVATION PER HR: HCPCS

## 2023-05-30 PROCEDURE — 700111 HCHG RX REV CODE 636 W/ 250 OVERRIDE (IP): Performed by: STUDENT IN AN ORGANIZED HEALTH CARE EDUCATION/TRAINING PROGRAM

## 2023-05-30 PROCEDURE — 700111 HCHG RX REV CODE 636 W/ 250 OVERRIDE (IP): Performed by: INTERNAL MEDICINE

## 2023-05-30 PROCEDURE — 97162 PT EVAL MOD COMPLEX 30 MIN: CPT

## 2023-05-30 PROCEDURE — 700102 HCHG RX REV CODE 250 W/ 637 OVERRIDE(OP): Performed by: STUDENT IN AN ORGANIZED HEALTH CARE EDUCATION/TRAINING PROGRAM

## 2023-05-30 PROCEDURE — 96375 TX/PRO/DX INJ NEW DRUG ADDON: CPT

## 2023-05-30 RX ORDER — METHYLPREDNISOLONE 4 MG/1
4 TABLET ORAL
Status: DISCONTINUED | OUTPATIENT
Start: 2023-05-31 | End: 2023-05-30 | Stop reason: HOSPADM

## 2023-05-30 RX ORDER — LIDOCAINE 50 MG/G
1 PATCH TOPICAL EVERY 24 HOURS
Qty: 10 PATCH | Refills: 0 | Status: SHIPPED | OUTPATIENT
Start: 2023-05-30 | End: 2023-06-28 | Stop reason: SDUPTHER

## 2023-05-30 RX ORDER — METHYLPREDNISOLONE 4 MG/1
4 TABLET ORAL
Status: DISCONTINUED | OUTPATIENT
Start: 2023-06-02 | End: 2023-05-30 | Stop reason: HOSPADM

## 2023-05-30 RX ORDER — METHYLPREDNISOLONE 4 MG/1
8 TABLET ORAL
Status: DISCONTINUED | OUTPATIENT
Start: 2023-05-31 | End: 2023-05-30 | Stop reason: HOSPADM

## 2023-05-30 RX ORDER — METHYLPREDNISOLONE 4 MG/1
4 TABLET ORAL
Status: DISCONTINUED | OUTPATIENT
Start: 2023-06-01 | End: 2023-05-30 | Stop reason: HOSPADM

## 2023-05-30 RX ORDER — GABAPENTIN 100 MG/1
100 CAPSULE ORAL
Qty: 28 CAPSULE | Refills: 0 | Status: SHIPPED | OUTPATIENT
Start: 2023-05-30 | End: 2023-06-13

## 2023-05-30 RX ORDER — METHYLPREDNISOLONE 4 MG/1
4 TABLET ORAL 2 TIMES DAILY WITH MEALS
Status: DISCONTINUED | OUTPATIENT
Start: 2023-06-03 | End: 2023-05-30 | Stop reason: HOSPADM

## 2023-05-30 RX ORDER — KETOROLAC TROMETHAMINE 30 MG/ML
15 INJECTION, SOLUTION INTRAMUSCULAR; INTRAVENOUS ONCE
Status: COMPLETED | OUTPATIENT
Start: 2023-05-30 | End: 2023-05-30

## 2023-05-30 RX ORDER — TIZANIDINE 4 MG/1
4 TABLET ORAL EVERY 6 HOURS PRN
Qty: 30 TABLET | Refills: 0 | Status: SHIPPED | OUTPATIENT
Start: 2023-05-30 | End: 2023-06-28

## 2023-05-30 RX ORDER — METHYLPREDNISOLONE 4 MG/1
TABLET ORAL
Qty: 21 TABLET | Refills: 0 | Status: SHIPPED | OUTPATIENT
Start: 2023-05-30 | End: 2023-06-29

## 2023-05-30 RX ADMIN — ACETAMINOPHEN 1000 MG: 500 TABLET, FILM COATED ORAL at 07:42

## 2023-05-30 RX ADMIN — ACETAMINOPHEN 1000 MG: 500 TABLET, FILM COATED ORAL at 11:42

## 2023-05-30 RX ADMIN — KETOROLAC TROMETHAMINE 15 MG: 30 INJECTION, SOLUTION INTRAMUSCULAR; INTRAVENOUS at 11:42

## 2023-05-30 RX ADMIN — METHYLPREDNISOLONE 24 MG: 16 TABLET ORAL at 12:34

## 2023-05-30 RX ADMIN — ONDANSETRON 4 MG: 4 TABLET, ORALLY DISINTEGRATING ORAL at 07:42

## 2023-05-30 RX ADMIN — OMEPRAZOLE 20 MG: 20 CAPSULE, DELAYED RELEASE ORAL at 07:42

## 2023-05-30 RX ADMIN — POLYETHYLENE GLYCOL 3350 1 PACKET: 17 POWDER, FOR SOLUTION ORAL at 07:42

## 2023-05-30 RX ADMIN — SENNOSIDES AND DOCUSATE SODIUM 2 TABLET: 50; 8.6 TABLET ORAL at 07:42

## 2023-05-30 RX ADMIN — GABAPENTIN 100 MG: 100 CAPSULE ORAL at 07:42

## 2023-05-30 RX ADMIN — GABAPENTIN 100 MG: 100 CAPSULE ORAL at 11:42

## 2023-05-30 ASSESSMENT — COGNITIVE AND FUNCTIONAL STATUS - GENERAL
CLIMB 3 TO 5 STEPS WITH RAILING: A LITTLE
SUGGESTED CMS G CODE MODIFIER DAILY ACTIVITY: CK
DRESSING REGULAR UPPER BODY CLOTHING: A LITTLE
TURNING FROM BACK TO SIDE WHILE IN FLAT BAD: UNABLE
HELP NEEDED FOR BATHING: A LITTLE
DRESSING REGULAR LOWER BODY CLOTHING: A LITTLE
DAILY ACTIVITIY SCORE: 19
SUGGESTED CMS G CODE MODIFIER MOBILITY: CL
MOVING TO AND FROM BED TO CHAIR: UNABLE
WALKING IN HOSPITAL ROOM: A LITTLE
TOILETING: A LOT
MOVING FROM LYING ON BACK TO SITTING ON SIDE OF FLAT BED: UNABLE
MOBILITY SCORE: 12
STANDING UP FROM CHAIR USING ARMS: A LITTLE

## 2023-05-30 ASSESSMENT — GAIT ASSESSMENTS
DEVIATION: BRADYKINETIC
DISTANCE (FEET): 150
ASSISTIVE DEVICE: FRONT WHEEL WALKER
GAIT LEVEL OF ASSIST: STANDBY ASSIST

## 2023-05-30 ASSESSMENT — PAIN DESCRIPTION - PAIN TYPE: TYPE: ACUTE PAIN

## 2023-05-30 ASSESSMENT — ACTIVITIES OF DAILY LIVING (ADL): TOILETING: INDEPENDENT

## 2023-05-30 NOTE — PROGRESS NOTES
Pharmacy approved medications. Provided oxycodone and zofran per MAR. Patient refused to take additional medication on MAR until nausea has subsided.

## 2023-05-30 NOTE — THERAPY
"Occupational Therapy   Initial Evaluation     Patient Name: Hermelinda Mosley  Age:  72 y.o., Sex:  female  Medical Record #: [id number]  Today's Date: 5/30/2023     Precautions  Precautions: Fall Risk, Spinal / Back Precautions   Comments: for comfort    Assessment  Patient is 72 y.o. female admitted for intractable back pain and urinary/bowel retention, found to have \"mild canal narrowing at L3-4 with moderate right lateral recess narrowing and slight impingement upon the right descending L4 nerve\", and \"moderate canal narrowing with moderate lateral recess narrowing at L4-5\". Educated pt and family on spinal precautions for comfort, adaptive techniques for ADL/txfs, home safety, AE for toileting, DME for BR safety/comfort, proper positioning in sitting for comfort, appropriate body mechanics during IADLs, and importance of continued OOB activity. Provided with handout. Pt reports lives with her  who has his own health issues and can provide minimal physical assist. Dtrs (RNs) present and supportive; report can assist intermittently. Currently limited by decreased functional mobility, activity tolerance, cognition, strength, balance, and pain which are currently affecting pt's ability to complete ADLs/IADLs at baseline. Will continue to follow.     Plan    Occupational Therapy Initial Treatment Plan   Treatment Interventions: Self Care / Activities of Daily Living, Adaptive Equipment, Neuro Re-Education / Balance, Therapeutic Exercises, Therapeutic Activity  Treatment Frequency: 4 Times per Week  Duration: Until Therapy Goals Met    DC Equipment Recommendations: Toilet Aide, Raised Toilet Seat with Arms  Discharge Recommendations: Recommend home health for continued occupational therapy services      Objective     05/30/23 1124   Prior Living Situation   Prior Services Home-Independent   Housing / Facility 2 Story House  (able to stay on the first floor)   Steps Into Home 1   Steps In Home   (flight) "   Bathroom Set up Walk In Shower;Grab Bars;Shower Chair   Equipment Owned 4-Wheel Walker;Tub / Shower Seat;Grab Bar(s) In Tub / Shower;Reacher   Lives with - Patient's Self Care Capacity Spouse   Comments Pt reports lives with her  who has his own health issues and can provide minimal physical assist. Dtrs (RNs) present and supportive; report can assist intermittently.   Prior Level of ADL Function   Self Feeding Independent   Grooming / Hygiene Independent   Bathing Independent   Dressing Independent   Toileting Independent   Prior Level of IADL Function   Medication Management Independent   Laundry Independent   Kitchen Mobility Independent   Finances Independent   Home Management Independent   Shopping Independent   Prior Level Of Mobility Independent Without Device in Community;Independent Without Device in Home   Driving / Transportation Driving Independent   Precautions   Precautions Fall Risk;Spinal / Back Precautions    Comments for comfort   Vitals   O2 Delivery Device Room air w/o2 available   Vitals Comments SpO2 >90%   Pain 0 - 10 Group   Location Back   Location Orientation Mid   Therapist Pain Assessment Post Activity;During Activity;Nurse Notified  (not quantified; better with ambulation)   Cognition    Cognition / Consciousness X   Speech/ Communication Hard of Hearing   Level of Consciousness Alert   Comments very pleasant and cooperative; receptive to education   Passive ROM Upper Body   Passive ROM Upper Body WDL   Active ROM Upper Body   Active ROM Upper Body  WDL   Strength Upper Body   Upper Body Strength  WDL   Comments limited by pain   Balance Assessment   Sitting Balance (Static) Fair +   Sitting Balance (Dynamic) Fair   Standing Balance (Static) Fair   Standing Balance (Dynamic) Fair   Weight Shift Sitting Good   Weight Shift Standing Fair   Comments w/FWW; BUE support initially at EOB   Bed Mobility    Supine to Sit Moderate Assist   Sit to Supine   (seated in chair)   Scooting  Supervised   Rolling Moderate Assist to Lt.   Comments edu on logroll   ADL Assessment   Eating Supervision   Grooming Standing;Standby Assist  (using adaptive techniques)   Lower Body Dressing Minimal Assist   Toileting Moderate Assist  (for pericare after BM)   Comments Educated pt and family on spinal precautions, adaptive techniques for ADL/txfs, home safety, AE for toileting, DME for BR safety, proper positioning in sitting for comfort, appropriate body mechanics during IADLs, and importance of continued OOB activity. Provided with handout.   Functional Mobility   Sit to Stand Contact Guard Assist   Bed, Chair, Wheelchair Transfer Contact Guard Assist   Toilet Transfers Minimal Assist  (heavy use of GB and v/cs)   Transfer Method Stand Step   Mobility mod v/cs for comfort/safety techniques; w/FWW in room and hallway BR   Edema / Skin Assessment   Edema / Skin  Not Assessed   Activity Tolerance   Comments limited by pain and fatigue   Patient / Family Goals   Patient / Family Goal #1 to go home   Short Term Goals   Short Term Goal # 1 LB dressing with SPV   Short Term Goal # 2 toileting with SPV   Short Term Goal # 3 verbalize and maintain spinal precautions for comfort during ADLs w/o v/cs   Education Group   Education Provided Role of Occupational Therapist;Spinal Precautions   Role of Occupational Therapist Patient Response Patient;Family;Acceptance;Explanation;Verbal Demonstration;Reinforcement Needed   Spinal Precautions Patient Response Patient;Family;Acceptance;Explanation;Verbal Demonstration;Reinforcement Needed   Occupational Therapy Initial Treatment Plan    Treatment Interventions Self Care / Activities of Daily Living;Adaptive Equipment;Neuro Re-Education / Balance;Therapeutic Exercises;Therapeutic Activity   Treatment Frequency 4 Times per Week   Duration Until Therapy Goals Met   Problem List   Problem List Decreased Active Daily Living Skills;Decreased Homemaking Skills;Decreased Upper Extremity  Strength Right;Decreased Upper Extremity Strength Left;Decreased Functional Mobility;Decreased Activity Tolerance;Impaired Postural Control / Balance

## 2023-05-30 NOTE — PROGRESS NOTES
Patient A & O x 4, [place], returned recently from MRI. Medicated bacilio MAR, Purewick initiated, given compazine for N/V. Bed in lowest position, call light within reach, and no  needs at this time.

## 2023-05-30 NOTE — DISCHARGE SUMMARY
"Discharge Summary    CHIEF COMPLAINT ON ADMISSION  Chief Complaint   Patient presents with    Flank Pain     Pt reports \"burning\" R flank x1 month, seen in ER for same. Today started having L flank pain that radiates to lower back and groin. Last BM 2 days ago. Loss of appetite.        Reason for Admission  Acute intractable back pain    Admission Date  5/29/2023    CODE STATUS  Full Code    HPI & HOSPITAL COURSE  Per Dr. Carmichael's HPI dated 5/29/2023:  \"Hermelinda Mosley is a 72 y.o. female who presented 5/29/2023 with back pain.  Patient reports about a month ago she started having right-sided flank pain that was at her lower ribs that radiated from her back forward and was tender to palpation.  Daughter and patient think it started after she tried to help of her son who unfortunately has frontotemporal dementia.  She was seen in [facility] ER at that time and work-up was negative.  She continued to have this pain however has been unable to get into her primary care physician.  She reports that it started to improve and on Friday she did some outdoor work, starting Saturday she had a left lower back pain.  Yesterday was worse and this morning it was so bad she could not get out of bed.  She denies any numbness or tingling to her legs.  She does report that she has been constipated since Saturday and has not been able to urinate since last night.  Despite IV pain meds given by EMS and ERP patient still in severe pain especially with movement.  She was on rosuvastatin however she stopped this a few weeks ago thinking that could be causing her symptoms.  She denies any fevers, chills, chest pain or shortness of breath.  She does report she has been overall fatigued and not feeling well for the last month.  Patient's daughter reports that she has been under an extreme amount of stress and has been crying a lot due to the issue with her son.  Of note patient also complaining of reflux pain with active nausea and " "vomiting at time of my evaluation.  She states she seen 2 different GI doctors 1 of which told her she had esophageal varices and needed to be on omeprazole and the second of which told her she did not have those and can discontinue the medication.  They have been trying to get a third opinion, but have been unable to get an appointment.     In the ER vital signs stable, labs only significant for hemoglobin 16.4 otherwise stable.  CTA showed no acute process in the abdomen or pelvis. CT L-spine showed mild to moderate lumbar spondylosis no acute fracture.  Despite getting fentanyl and 8 mg of IV morphine in the ER patient still having intractable pain and unable to ambulate, hospitalist consulted for admission.\"    Hospital course under my care 5/30: afebrile and vitals wnl. BP improved. Exam at bedside was notable for limited ROM and severe low back pain with minimal LE movement; +SLE. She has been urinating spontaneously. Pain was manageable upon stationary.     Labs and imagings findings discussed with patient and family (two daughters). MRI LS was notable for mild canal narrowing at L3-4 with moderate right lateral recess narrowing and slight impingement upon the right descending L4 nerve. At L4-5 there is moderate canal narrowing with moderate lateral recess narrowing on both sides. This finding is briefly discussed with neurosurgery on call Dr. Mccarthy - no urgent inpatient evaluation or surgical intervention is indicated. Recommended pain management, PT/OT and outpatient follow up.     Pt participated with PT/OT - actually did well upon ambulation with FWW. Outpatient PT Rx was provided. At this point, multi-modal pain management discussed with a short tapering dose of steroid, lidocaine patch, Neurontin and muscle relaxants. Care plan discussed in detail with Pt and family. All questions answered.     Therefore, she is discharged in fair and stable condition to home with close outpatient follow-up.    The " patient recovered much more quickly than anticipated on admission.    Discharge Date  05/30/23    FOLLOW UP ITEMS POST DISCHARGE  Neurosurgery outpatient (referral placed)    DISCHARGE DIAGNOSES  Principal Problem:    Intractable back pain (POA: Yes)  Active Problems:    Esophageal reflux (POA: Yes)    Chronic kidney disease, stage 3a (HCC) (POA: Yes)    Factor V Leiden mutation (HCC) (POA: Yes)      Overview: heterozygous factor V Leiden mutation, and significant family history of       venous thrombolysis       Also has alpha-1 antitrypsin mutation          Dyslipidemia (POA: Yes)    Constipation (POA: Yes)    Urinary retention (POA: Yes)  Resolved Problems:    * No resolved hospital problems. *      FOLLOW UP  Future Appointments   Date Time Provider Department Center   6/28/2023 10:00 AM [facility]   7/6/2023  2:00 PM [facility]       MEDICATIONS ON DISCHARGE     Medication List        START taking these medications        Instructions   lidocaine 5 % Ptch  Commonly known as: LIDODERM   Place 1 Patch on the skin every 24 hours.  Dose: 1 Patch     methylPREDNISolone 4 MG Tbpk  Commonly known as: MEDROL DOSEPAK   Follow schedule on package instructions.     tizanidine 4 MG Tabs  Commonly known as: ZANAFLEX   Take 1 Tablet by mouth every 6 hours as needed (Muscle spasm).  Dose: 4 mg            CHANGE how you take these medications        Instructions   gabapentin 100 MG Caps  What changed: when to take this  Commonly known as: NEURONTIN   Take 1 Capsule by mouth 2 (two) times a day for 14 days.  Dose: 100 mg            CONTINUE taking these medications        Instructions   CALCIUM PO   Take 1 Tablet by mouth every day.  Dose: 1 Tablet     MULTIVITAMIN PO   Take 1 Tablet by mouth every day.  Dose: 1 Tablet     omeprazole 20 MG delayed-release capsule  Commonly known as: PRILOSEC   Take 1 Capsule by mouth every day.  Dose: 20 mg     rivaroxaban 10 MG Tabs  tablet  Commonly known as: XARELTO   Doctor's comments: This rx was submitted by a pharmacist working under a collaborative practice agreement.  Take 1 Tablet by mouth with dinner.  Dose: 10 mg     VITAMIN D PO   Take  by mouth.            STOP taking these medications      rosuvastatin 20 MG Tabs  Commonly known as: Crestor              Allergies  No Known Allergies    DIET  Orders Placed This Encounter   Procedures    Diet Order Diet: Cardiac     Standing Status:   Standing     Number of Occurrences:   1     Order Specific Question:   Diet:     Answer:   Cardiac [6]       ACTIVITY  As tolerated.  Weight bearing as tolerated    CONSULTATIONS  Neurosurg (chart review)    PROCEDURES  N/A    LABORATORY  Lab Results   Component Value Date    SODIUM 140 05/30/2023    POTASSIUM 4.0 05/30/2023    CHLORIDE 104 05/30/2023    CO2 24 05/30/2023    GLUCOSE 101 (H) 05/30/2023    BUN 14 05/30/2023    CREATININE 0.87 05/30/2023    CREATININE 0.96 12/30/2009    GLOMRATE 59 (L) 12/30/2009        Lab Results   Component Value Date    WBC 7.2 05/30/2023    WBC 4.2 12/30/2009    HEMOGLOBIN 15.2 05/30/2023    HEMATOCRIT 45.9 05/30/2023    PLATELETCT 243 05/30/2023        Total time of the discharge process exceeds 36 minutes.

## 2023-05-30 NOTE — PROGRESS NOTES
Transport at bedside to take patient to MRI. Patient feeling better after zofran dose, however only wanted to take omeprazole prior to MRI and wanted to wait for the additional pills to be given after. Retimed MAR due to MRI and patient request.

## 2023-05-30 NOTE — THERAPY
Physical Therapy   Initial Evaluation     Patient Name: Hermelinda Mosley  Age:  72 y.o., Sex:  female  Medical Record #: [id number]  Today's Date: 5/30/2023          Assessment  Patient is 72 y.o. female admitted with acute low back pain limiting ability to ambulate. Pt presents to physical therapy with acute impairments in functional transfers, balance, gait and activity tolerance related to acute pain. Pt required Mod A to complete log roll for bed mobility and SBA to ambulate with FWW. Pt reports improved pain once upright and ambulating. Educated on goals for ambulation upon DC home, use of ice PRN and benefit of OP PT follow up to fully address acute LBP exacerbation. PT to follow while in house to address impairments noted below.     Plan    Physical Therapy Initial Treatment Plan   Treatment Plan : Bed Mobility, Equipment, Gait Training, Neuro Re-Education / Balance, Self Care / Home Evaluation, Stair Training, Therapeutic Activities, Therapeutic Exercise  Treatment Frequency: 4 Times per Week  Duration: Until Therapy Goals Met    DC Equipment Recommendations: None  Discharge Recommendations: Recommend outpatient physical therapy services to address higher level deficits     05/30/23 1101   Vitals   O2 Delivery Device Room air w/o2 available   Pain 0 - 10 Group   Therapist Pain Assessment Nurse Notified  (LBP, no peripherailzation. not rated)   Prior Living Situation   Prior Services Home-Independent   Housing / Facility 2 Story House   Steps Into Home 1   Steps In Home   (flight but has bedroom downstairs which she can use)   Equipment Owned 4-Wheel Walker   Lives with - Patient's Self Care Capacity Spouse   Prior Level of Functional Mobility   Bed Mobility Independent   Transfer Status Independent   Ambulation Independent   Ambulation Distance community   Assistive Devices Used None   Stairs Independent   Cognition    Cognition / Consciousness WDL   Level of Consciousness Alert   Comments [place] without hearing  aides. Dtrs at bedside, RN's, supportive   Passive ROM Lower Body   Passive ROM Lower Body X   Comments L LE with (+) SLR at 60 degrees, R LE at 90 degrees   Active ROM Lower Body    Active ROM Lower Body  X   Comments knee extension and hip flexion limited by pain   Strength Lower Body   Lower Body Strength  X   Comments B knee extension 3/5, hip flexion 2+/5 limited by pain. otherwise 5/5 (ankle DF/PF, hip ABD/ADD)   Balance Assessment   Sitting Balance (Static) Fair +   Sitting Balance (Dynamic) Fair +   Standing Balance (Static) Fair   Standing Balance (Dynamic) Fair   Weight Shift Sitting Good   Weight Shift Standing Fair   Comments w/ FWW   Bed Mobility    Supine to Sit Moderate Assist   Sit to Supine   (up with OT at end of session)   Scooting Supervised   Rolling Moderate Assist to Lt.   Comments extra assist and support required for introduction for log roll for pain management   Gait Analysis   Gait Level Of Assist Standby Assist   Assistive Device Front Wheel Walker   Distance (Feet) 150   # of Times Distance was Traveled 1   Deviation Bradykinetic   # of Stairs Climbed 0   Weight Bearing Status no restrictions   Comments slightly guarded gait in anticipation of pain   Functional Mobility   Sit to Stand Standby Assist   How much difficulty does the patient currently have...   Turning over in bed (including adjusting bedclothes, sheets and blankets)? 1   Sitting down on and standing up from a chair with arms (e.g., wheelchair, bedside commode, etc.) 1   Moving from lying on back to sitting on the side of the bed? 1   How much help from another person does the patient currently need...   Moving to and from a bed to a chair (including a wheelchair)? 3   Need to walk in a hospital room? 3   Climbing 3-5 steps with a railing? 3   6 clicks Mobility Score 12   Short Term Goals    Short Term Goal # 1 pt will perform log roll for bed mobility with SPV in 6 visits   Short Term Goal # 2 pt will ambulate > 200 ft  with LRAD and SPV to access community/ OP PT in 6 vistis   Short Term Goal # 3 pt will negotiate 1 flight of stairs with LRAD and SPV to access home in 6 visits   Education Group   Education Provided Spine Precautions;Role of Physical Therapist   Spine Precautions Patient Response Patient;Acceptance;Explanation;Verbal Demonstration;Family;Handout   Role of Physical Therapist Patient Response Patient;Acceptance;Explanation;Verbal Demonstration;Family   Physical Therapy Initial Treatment Plan    Treatment Plan  Bed Mobility;Equipment;Gait Training;Neuro Re-Education / Balance;Self Care / Home Evaluation;Stair Training;Therapeutic Activities;Therapeutic Exercise   Treatment Frequency 4 Times per Week   Duration Until Therapy Goals Met   Problem List    Problems Pain;Impaired Bed Mobility;Impaired Transfers;Impaired Ambulation;Functional Strength Deficit;Impaired Balance;Decreased Activity Tolerance   Anticipated Discharge Equipment and Recommendations   DC Equipment Recommendations None   Discharge Recommendations Recommend outpatient physical therapy services to address higher level deficits   Interdisciplinary Plan of Care Collaboration   IDT Collaboration with  Nursing;Family / Caregiver   Patient Position at End of Therapy Seated;Call Light within Reach;Tray Table within Reach;Family / Friend in Room   Collaboration Comments aware of PT eval and recs   Session Information   Date / Session Number  5/30-1 (1/4, 6/5)

## 2023-05-30 NOTE — PROGRESS NOTES
4 Eyes Skin Assessment Completed by BEKAH Malik and BEKAH Fonseca.    Head WDL  Ears WDL  Nose WDL  Mouth WDL  Neck WDL  Breast/Chest WDL  Shoulder Blades WDL  Spine WDL  (R) Arm/Elbow/Hand WDL  (L) Arm/Elbow/Hand WDL  Abdomen WDL  Groin WDL  Scrotum/Coccyx/Buttocks Redness and Blanching  (R) Leg WDL  (L) Leg WDL  (R) Heel/Foot/Toe WDL  (L) Heel/Foot/Toe WDL          Devices In Places Pulse Ox      Interventions In Place Pillows    Possible Skin Injury No    Pictures Uploaded Into Epic N/A  Wound Consult Placed N/A  RN Wound Prevention Protocol Ordered No

## 2023-05-30 NOTE — CARE PLAN
The patient is Stable - Low risk of patient condition declining or worsening         Progress made toward(s) clinical / shift goals:        Problem: Pain - Standard  Goal: Alleviation of pain or a reduction in pain to the patient’s comfort goal  Outcome: Progressing     Problem: Knowledge Deficit - Standard  Goal: Patient and family/care givers will demonstrate understanding of plan of care, disease process/condition, diagnostic tests and medications  Outcome: Progressing

## 2023-06-13 ENCOUNTER — PATIENT MESSAGE (OUTPATIENT)
Dept: HEALTH INFORMATION MANAGEMENT | Facility: OTHER | Age: 73
End: 2023-06-13

## 2023-06-13 ENCOUNTER — PATIENT OUTREACH (OUTPATIENT)
Dept: HEALTH INFORMATION MANAGEMENT | Facility: OTHER | Age: 73
End: 2023-06-13
Payer: MEDICARE

## 2023-06-13 DIAGNOSIS — N18.31 CHRONIC KIDNEY DISEASE, STAGE 3A: ICD-10-CM

## 2023-06-20 ENCOUNTER — PATIENT OUTREACH (OUTPATIENT)
Dept: HEALTH INFORMATION MANAGEMENT | Facility: OTHER | Age: 73
End: 2023-06-20
Payer: MEDICARE

## 2023-06-20 DIAGNOSIS — N18.31 CHRONIC KIDNEY DISEASE, STAGE 3A: ICD-10-CM

## 2023-06-20 SDOH — SOCIAL STABILITY: SOCIAL NETWORK: HOW OFTEN DO YOU ATTEND CHURCH OR RELIGIOUS SERVICES?: 1 TO 4 TIMES PER YEAR

## 2023-06-20 SDOH — SOCIAL STABILITY: SOCIAL NETWORK
DO YOU BELONG TO ANY CLUBS OR ORGANIZATIONS SUCH AS CHURCH GROUPS UNIONS, FRATERNAL OR ATHLETIC GROUPS, OR SCHOOL GROUPS?: YES

## 2023-06-20 SDOH — ECONOMIC STABILITY: HOUSING INSECURITY
IN THE LAST 12 MONTHS, WAS THERE A TIME WHEN YOU DID NOT HAVE A STEADY PLACE TO SLEEP OR SLEPT IN A SHELTER (INCLUDING NOW)?: NO

## 2023-06-20 SDOH — ECONOMIC STABILITY: FOOD INSECURITY: WITHIN THE PAST 12 MONTHS, THE FOOD YOU BOUGHT JUST DIDN'T LAST AND YOU DIDN'T HAVE MONEY TO GET MORE.: NEVER TRUE

## 2023-06-20 SDOH — SOCIAL STABILITY: SOCIAL NETWORK
IN A TYPICAL WEEK, HOW MANY TIMES DO YOU TALK ON THE PHONE WITH FAMILY, FRIENDS, OR NEIGHBORS?: MORE THAN THREE TIMES A WEEK

## 2023-06-20 SDOH — ECONOMIC STABILITY: FOOD INSECURITY: WITHIN THE PAST 12 MONTHS, YOU WORRIED THAT YOUR FOOD WOULD RUN OUT BEFORE YOU GOT MONEY TO BUY MORE.: NEVER TRUE

## 2023-06-20 SDOH — HEALTH STABILITY: MENTAL HEALTH: HOW OFTEN DO YOU HAVE 6 OR MORE DRINKS ON ONE OCCASION?: NEVER

## 2023-06-20 SDOH — HEALTH STABILITY: MENTAL HEALTH: HOW MANY STANDARD DRINKS CONTAINING ALCOHOL DO YOU HAVE ON A TYPICAL DAY?: 1 OR 2

## 2023-06-20 SDOH — ECONOMIC STABILITY: INCOME INSECURITY: IN THE LAST 12 MONTHS, WAS THERE A TIME WHEN YOU WERE NOT ABLE TO PAY THE MORTGAGE OR RENT ON TIME?: NO

## 2023-06-20 SDOH — SOCIAL STABILITY: SOCIAL NETWORK: ARE YOU MARRIED, WIDOWED, DIVORCED, SEPARATED, NEVER MARRIED, OR LIVING WITH A PARTNER?: MARRIED

## 2023-06-20 SDOH — ECONOMIC STABILITY: TRANSPORTATION INSECURITY
IN THE PAST 12 MONTHS, HAS LACK OF TRANSPORTATION KEPT YOU FROM MEETINGS, WORK, OR FROM GETTING THINGS NEEDED FOR DAILY LIVING?: NO

## 2023-06-20 SDOH — ECONOMIC STABILITY: INCOME INSECURITY: HOW HARD IS IT FOR YOU TO PAY FOR THE VERY BASICS LIKE FOOD, HOUSING, MEDICAL CARE, AND HEATING?: NOT HARD AT ALL

## 2023-06-20 SDOH — SOCIAL STABILITY: SOCIAL NETWORK: HOW OFTEN DO YOU GET TOGETHER WITH FRIENDS OR RELATIVES?: MORE THAN THREE TIMES A WEEK

## 2023-06-20 SDOH — HEALTH STABILITY: MENTAL HEALTH: HOW OFTEN DO YOU HAVE A DRINK CONTAINING ALCOHOL?: MONTHLY OR LESS

## 2023-06-20 SDOH — HEALTH STABILITY: MENTAL HEALTH
STRESS IS WHEN SOMEONE FEELS TENSE, NERVOUS, ANXIOUS, OR CAN'T SLEEP AT NIGHT BECAUSE THEIR MIND IS TROUBLED. HOW STRESSED ARE YOU?: NOT AT ALL

## 2023-06-20 SDOH — HEALTH STABILITY: PHYSICAL HEALTH: ON AVERAGE, HOW MANY DAYS PER WEEK DO YOU ENGAGE IN MODERATE TO STRENUOUS EXERCISE (LIKE A BRISK WALK)?: 0 DAYS

## 2023-06-20 SDOH — ECONOMIC STABILITY: HOUSING INSECURITY: IN THE LAST 12 MONTHS, HOW MANY PLACES HAVE YOU LIVED?: 1

## 2023-06-20 SDOH — HEALTH STABILITY: PHYSICAL HEALTH: ON AVERAGE, HOW MANY MINUTES DO YOU ENGAGE IN EXERCISE AT THIS LEVEL?: 0 MIN

## 2023-06-20 SDOH — ECONOMIC STABILITY: TRANSPORTATION INSECURITY
IN THE PAST 12 MONTHS, HAS THE LACK OF TRANSPORTATION KEPT YOU FROM MEDICAL APPOINTMENTS OR FROM GETTING MEDICATIONS?: NO

## 2023-06-20 ASSESSMENT — LIFESTYLE VARIABLES
AUDIT-C TOTAL SCORE: 1
SKIP TO QUESTIONS 9-10: 1

## 2023-06-20 NOTE — PROGRESS NOTES
Clinic Community Health Worker Intake      Identified barriers to N/A.    CHW Interventions: N/A    Has PCP appointment scheduled for 6/28/23    Scheduled CHW Follow-Up Date: N/A  Type of Follow-Up (phone, virtual, clinic, home): N/A    Internal referral to SW/RN Care Coordinator?: Referral to [facility] for CCM       Plan: CHW started enrollment for CCM. 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 Pt is for the most part confident in managing her own health. Pt is currently doing PT for back pain and tries to walk occasionally. Pt follows a lower sodium and low fat diet. Pt did not have any barriers to food, housing, or transportation at this time. CAT[facility] Luke notified of pt's enrollment date on 6/21/23.

## 2023-06-21 ENCOUNTER — PATIENT OUTREACH (OUTPATIENT)
Dept: HEALTH INFORMATION MANAGEMENT | Facility: OTHER | Age: 73
End: 2023-06-21
Payer: MEDICARE

## 2023-06-21 DIAGNOSIS — E78.5 DYSLIPIDEMIA: ICD-10-CM

## 2023-06-21 DIAGNOSIS — N18.31 CHRONIC KIDNEY DISEASE, STAGE 3A: ICD-10-CM

## 2023-06-21 ASSESSMENT — PATIENT HEALTH QUESTIONNAIRE - PHQ9
5. POOR APPETITE OR OVEREATING: 0 - NOT AT ALL
CLINICAL INTERPRETATION OF PHQ2 SCORE: 1
SUM OF ALL RESPONSES TO PHQ QUESTIONS 1-9: 5

## 2023-06-21 NOTE — PROGRESS NOTES
23  @ 1006. Left message to call back with contact information and available times to enroll into Personal Care Management Program.   23 @ 1032. Received call back from patient to enroll in program    Hermelinda is a 73 year old female interested in enrolling in Personal Care Management. Assessment questions completed via telephone.She has verbalized her full name and  as well as given verbal consent to enroll in the CCM program. Patient has a support system that consists of her , children and grandchildren. Patient lives in a private residence with her  who is bed bound. Her home does not consist of stairs.Patient does not currently have home services coming to her home. Patient does not have spiritual beliefs that may affect care given to patient. Patient does not have an Advance Directive. Family is  aware of her wishes. Patient does not have medical equipment at home.Patient does not have communication barriers. Patient does have reliable transportation as she is an active  and has personal vehicle. Patient is eating a regular diet at home but is conscientious of monitoring carefully what she eats. Patient is taking medication as prescribed and vitamins as well. Patient has expressed goals of exercising, losing weight for more energy, Hermelinda's mother passed away in  and she is still struggling with the loss. Her son was also diagnosed with alzheimer's in his 20's and so would like to continue therapy. Hermelinda and her  would like to travel more but her  is in poor health.    INITIAL CARE MANAGEMENT CARE PLAN/ASSESSMENT         Medication Self-Management Goals:     Reviewed medications listed below with patient.      Current Outpatient Medications:     lidocaine (LIDODERM) 5 % Patch, Place 1 Patch on the skin every 24 hours., Disp: 10 Patch, Rfl: 0    tizanidine (ZANAFLEX) 4 MG Tab, Take 1 Tablet by mouth every 6 hours as needed (Muscle spasm)., Disp: 30 Tablet, Rfl:  0    methylPREDNISolone (MEDROL DOSEPAK) 4 MG Tablet Therapy Pack, Follow schedule on package instructions., Disp: 21 Tablet, Rfl: 0    CALCIUM PO, Take 1 Tablet by mouth every day., Disp: , Rfl:     omeprazole (PRILOSEC) 20 MG delayed-release capsule, Take 1 Capsule by mouth every day., Disp: 90 Capsule, Rfl: 3    rivaroxaban (XARELTO) 10 MG Tab tablet, Take 1 Tablet by mouth with dinner., Disp: 90 Tablet, Rfl: 3    Multiple Vitamins-Minerals (MULTIVITAMIN PO), Take 1 Tablet by mouth every day., Disp: , Rfl:     Cholecalciferol (VITAMIN D PO), Take  by mouth., Disp: , Rfl:          Goal:  N/A       Physical/Functional/Environmental Status:     Activities of Daily Living:  Bathing: independent   Dressing: independent  Grooming: independent  Mouth Care: independent  Toileting: independent  Climbing a Flight of Stairs: independent    Independent Activities of Daily Living:  Shopping: independent  Cooking: independent  Managing Medications: independent  Using the phone and looking up numbers: independent  Driving or using public transportation: independent  Managing Finances: independent        6/20/2023     1:24 PM 6/21/2023     4:44 PM 6/21/2023     4:49 PM   STEADI Fall Risk   STEADI Risk for Falling Score  0    One or more falls in the last year No No No   Advised to use a cane or walker to get around safely  No    Feels unsteady when walking  No    Steadies self on furniture while walking at home  No    Worried about falling  No    Needs to push with hands when rising from a chair  No    Has trouble stepping up onto a curb / using stairs  No    Often has to rush to the toilet  No    Has lost some feeling in feet  No    Takes medicine that makes him/her feel lightheaded or more tired than usual  No    Takes medicine to sleep or improve mood  No    Often feels sad or depressed  No          Goal:  N/A    Social Determinants of Health       Financial Status:      Financial Resource Strain: Low Risk  (6/20/2023)     Overall Financial Resource Strain (CARDIA)     Difficulty of Paying Living Expenses: Not hard at all         Referred to CHW:  NA       Transportation Status:      Transportation Needs: No Transportation Needs (6/20/2023)    PRAPARE - Transportation     Lack of Transportation (Medical): No     Lack of Transportation (Non-Medical): No        Referred to CHW:  NA      Food Insecurity:      Food Insecurity: No Food Insecurity (6/20/2023)    Hunger Vital Sign     Worried About Running Out of Food in the Last Year: Never true     Ran Out of Food in the Last Year: Never true        Referred to CHW:  NA       Housing Status:     Housing Stability: Low Risk  (6/20/2023)    Housing Stability Vital Sign     Unable to Pay for Housing in the Last Year: No     Number of Places Lived in the Last Year: 1     Unstable Housing in the Last Year: No        Referred to CHW:  NA      Social Connections:     Social Connections: Socially Integrated (6/20/2023)    Social Connection and Isolation Panel [NHANES]     Frequency of Communication with Friends and Family: More than three times a week     Frequency of Social Gatherings with Friends and Family: More than three times a week     Attends Anabaptist Services: 1 to 4 times per year     Active Member of Clubs or Organizations: Yes     Attends Club or Organization Meetings: Not on file     Marital Status:         Referred to CHW:  NA      Mental/Behavioral/Psychosocial Status:        2/28/2023     9:20 AM 5/29/2023     8:27 PM 6/21/2023    10:03 AM   Depression Screen (PHQ-2/PHQ-9)   PHQ-2 Total Score  0    PHQ-2 Total Score 0  1   PHQ-9 Total Score   5       Interpretation of PHQ-9 Total Score   Score Severity   1-4 No Depression   5-9 Mild Depression   10-14 Moderate Depression   15-19 Moderately Severe Depression   20-27 Severe Depression       Goal:  N/A      Chronic Care Management Care Plan  Exercise    Goal:  Patient will verbalize understanding of the importance of regular  exercise to increase strength and stamina  Barriers: Lack of motivation, having little energy  Interventions: Will provide at home exercises resources    Start Date: 06/21/23  End Date:    Weight Loss/Diet  Goal: Patient will adopt healthier eating behaviors, engage in regular exercise, and achieve and maintain weight loss while prioritizing their overall health.  Barriers: Lack of resources.  Interventions: RN to provide education on healthy eating habits, provide diet resources promote regular exercise, support behavior modification, offer ongoing support and follow-up, and set achievable goals for weight loss and improved health outcomes.    Start Date: 06/21/23  End Date:    Goal: Hermelinda expressed a personal goal of traveling more because she and her  traveled a lot in the past.   Barriers: Hermelinda and her husbands health  Intervention: Encourage weight loss and exercise.    Start Date: 06/21/23  End Date:      Discussion: Discussion occurred at intake.       Next Scheduled patient outreach: 06/19/2023

## 2023-06-27 ENCOUNTER — HOSPITAL ENCOUNTER (OUTPATIENT)
Dept: LAB | Facility: MEDICAL CENTER | Age: 73
End: 2023-06-27
Attending: FAMILY MEDICINE
Payer: MEDICARE

## 2023-06-27 DIAGNOSIS — E78.5 DYSLIPIDEMIA: ICD-10-CM

## 2023-06-27 LAB
ALBUMIN SERPL BCP-MCNC: 4.3 G/DL (ref 3.2–4.9)
ALBUMIN/GLOB SERPL: 1.7 G/DL
ALP SERPL-CCNC: 64 U/L (ref 30–99)
ALT SERPL-CCNC: 18 U/L (ref 2–50)
ANION GAP SERPL CALC-SCNC: 10 MMOL/L (ref 7–16)
AST SERPL-CCNC: 21 U/L (ref 12–45)
BILIRUB SERPL-MCNC: 0.3 MG/DL (ref 0.1–1.5)
BUN SERPL-MCNC: 15 MG/DL (ref 8–22)
CALCIUM ALBUM COR SERPL-MCNC: 9.6 MG/DL (ref 8.5–10.5)
CALCIUM SERPL-MCNC: 9.8 MG/DL (ref 8.5–10.5)
CHLORIDE SERPL-SCNC: 105 MMOL/L (ref 96–112)
CO2 SERPL-SCNC: 26 MMOL/L (ref 20–33)
CREAT SERPL-MCNC: 1.01 MG/DL (ref 0.5–1.4)
FASTING STATUS PATIENT QL REPORTED: NORMAL
GFR SERPLBLD CREATININE-BSD FMLA CKD-EPI: 59 ML/MIN/1.73 M 2
GLOBULIN SER CALC-MCNC: 2.6 G/DL (ref 1.9–3.5)
GLUCOSE SERPL-MCNC: 84 MG/DL (ref 65–99)
POTASSIUM SERPL-SCNC: 4.2 MMOL/L (ref 3.6–5.5)
PROT SERPL-MCNC: 6.9 G/DL (ref 6–8.2)
SODIUM SERPL-SCNC: 141 MMOL/L (ref 135–145)

## 2023-06-27 PROCEDURE — 36415 COLL VENOUS BLD VENIPUNCTURE: CPT

## 2023-06-27 PROCEDURE — 80053 COMPREHEN METABOLIC PANEL: CPT

## 2023-06-28 ENCOUNTER — OFFICE VISIT (OUTPATIENT)
Dept: MEDICAL GROUP | Facility: PHYSICIAN GROUP | Age: 73
End: 2023-06-28
Payer: MEDICARE

## 2023-06-28 VITALS
SYSTOLIC BLOOD PRESSURE: 122 MMHG | HEART RATE: 78 BPM | WEIGHT: 165 LBS | RESPIRATION RATE: 18 BRPM | OXYGEN SATURATION: 98 % | BODY MASS INDEX: 29.23 KG/M2 | HEIGHT: 63 IN | TEMPERATURE: 97.9 F | DIASTOLIC BLOOD PRESSURE: 80 MMHG

## 2023-06-28 DIAGNOSIS — M54.9 INTRACTABLE BACK PAIN: ICD-10-CM

## 2023-06-28 PROCEDURE — 3079F DIAST BP 80-89 MM HG: CPT | Performed by: FAMILY MEDICINE

## 2023-06-28 PROCEDURE — 3074F SYST BP LT 130 MM HG: CPT | Performed by: FAMILY MEDICINE

## 2023-06-28 PROCEDURE — 20553 NJX 1/MLT TRIGGER POINTS 3/>: CPT | Performed by: FAMILY MEDICINE

## 2023-06-28 RX ORDER — LIDOCAINE HYDROCHLORIDE 20 MG/ML
10 INJECTION, SOLUTION INFILTRATION; PERINEURAL ONCE
Status: COMPLETED | OUTPATIENT
Start: 2023-06-28 | End: 2023-06-28

## 2023-06-28 RX ORDER — METHOCARBAMOL 500 MG/1
500 TABLET, FILM COATED ORAL 3 TIMES DAILY
Qty: 90 TABLET | Refills: 3 | Status: SHIPPED | OUTPATIENT
Start: 2023-06-28

## 2023-06-28 RX ORDER — LIDOCAINE 50 MG/G
1 PATCH TOPICAL EVERY 24 HOURS
Qty: 10 PATCH | Refills: 0 | Status: SHIPPED | OUTPATIENT
Start: 2023-06-28

## 2023-06-28 RX ADMIN — LIDOCAINE HYDROCHLORIDE 10 ML: 20 INJECTION, SOLUTION INFILTRATION; PERINEURAL at 14:58

## 2023-06-28 ASSESSMENT — FIBROSIS 4 INDEX: FIB4 SCORE: 1.49

## 2023-06-28 NOTE — ASSESSMENT & PLAN NOTE
Hospital follow up for two visits to ER  With right flank pain, burning radiating around her side, no blisters and pain improving.   .thoracic back right and b/l low back paraspinal muscles    rx for robaxin as insomnia with flexeril  Lidocaine patches, short course of topical nsaid cream: minimal as on oral anticoagulant.     Trigger point injections with 2% lidocaine without epinephrine injected x 10 1 cc each with aspiration with no air or blood after skin cleaned with alcohol wipes to thoracic back right and b/l low back paraspinal muscles    .     Continue with PT, ice, tylenol  Ref to pain specialist/continue with spine specialist for injections if not improving.     Imaging reviewed:   Mild lumbar spine degenerative changes as described above. There is mild canal narrowing at L3-4 with moderate right lateral recess narrowing and slight impingement upon the right descending L4 nerve.     At L4-5 there is moderate canal narrowing with moderate lateral recess narrowing on both sides.      No significant abnormality in the thoracic spine. There is no evidence of significant canal or foraminal narrowing. Spinal cord signal is normal

## 2023-06-29 NOTE — PROGRESS NOTES
Subjective:   Hermelinda Mosley is a 73 y.o. female here today for evaluation and management of:     Intractable back pain  Hospital follow up for two visits to ER  With right flank pain, burning radiating around her side, no blisters and pain improving.   .thoracic back right and b/l low back paraspinal muscles    rx for robaxin as insomnia with flexeril  Lidocaine patches, short course of topical nsaid cream: minimal as on oral anticoagulant.     Trigger point injections with 2% lidocaine without epinephrine injected x 10 1 cc each with aspiration with no air or blood after skin cleaned with alcohol wipes to thoracic back right and b/l low back paraspinal muscles    .     Continue with PT, ice, tylenol  Ref to pain specialist/continue with spine specialist for injections if not improving.     Imaging reviewed:   Mild lumbar spine degenerative changes as described above. There is mild canal narrowing at L3-4 with moderate right lateral recess narrowing and slight impingement upon the right descending L4 nerve.     At L4-5 there is moderate canal narrowing with moderate lateral recess narrowing on both sides.      No significant abnormality in the thoracic spine. There is no evidence of significant canal or foraminal narrowing. Spinal cord signal is normal     HCC Gap Form    Last edited 06/29/23 11:04 PDT by Maribel Long M.D.             Current medicines (including changes today)  Current Outpatient Medications   Medication Sig Dispense Refill    lidocaine (LIDODERM) 5 % Patch Place 1 Patch on the skin every 24 hours. 10 Patch 0    diclofenac sodium (VOLTAREN) 1 % Gel Apply 2 g topically 1 time a day as needed (pain). 50 g 0    methocarbamol (ROBAXIN) 500 MG Tab Take 1 Tablet by mouth 3 times a day. 90 Tablet 3    CALCIUM PO Take 1 Tablet by mouth every day.      omeprazole (PRILOSEC) 20 MG delayed-release capsule Take 1 Capsule by mouth every day. 90 Capsule 3    rivaroxaban (XARELTO) 10 MG Tab tablet Take 1  "Tablet by mouth with dinner. 90 Tablet 3    Multiple Vitamins-Minerals (MULTIVITAMIN PO) Take 1 Tablet by mouth every day.      Cholecalciferol (VITAMIN D PO) Take  by mouth.       No current facility-administered medications for this visit.     She  has a past medical history of Abdominal pain, unspecified site, Alpha 1-antitrypsin PiMS phenotype, Back pain, Bronchitis, Chickenpox, Degeneration of lumbar or lumbosacral intervertebral disc, Diarrhea, DVT (deep venous thrombosis) (HCC), Esophageal reflux, Hungarian measles, Hernia of unspecified site of abdominal cavity without mention of obstruction or gangrene (repaired), History of colonoscopy (2005=normal), Influenza, Irritable bowel syndrome, Mumps, Pap smear (due), Pneumonia, Recurrent UTI (9/4/2009), Restless leg syndrome, Screening mammogram (10/30/2008=normal), Superficial thrombophlebitis of left leg (10/20/2017), Swelling (9/4/2009), Tonsillitis, Unspecified hemorrhoids without mention of complication, and Vein, varicose (stripped).    She has no past medical history of Encounter for long-term (current) use of other medications.    ROS  No chest pain, no shortness of breath, no abdominal pain       Objective:     /80 (BP Location: Left arm, Patient Position: Sitting, BP Cuff Size: Adult)   Pulse 78   Temp 36.6 °C (97.9 °F) (Temporal)   Resp 18   Ht 1.6 m (5' 3\")   Wt 74.8 kg (165 lb)   SpO2 98%  Body mass index is 29.23 kg/m².   Physical Exam:  Constitutional: Alert, no distress.  Skin: Warm, dry, good turgor, no rashes in visible areas.  Eye: Equal, round and reactive, conjunctiva clear, lids normal.  ENMT: Lips without lesions, good dentition, oropharynx clear.  Neck: Trachea midline, no masses, no thyromegaly. No cervical or supraclavicular lymphadenopathy  Respiratory: Unlabored respiratory effort, lungs clear to auscultation, no wheezes, no ronchi.  Cardiovascular: Normal S1, S2, no murmur, no edema.  Abdomen: Soft, non-tender, no masses, no " hepatosplenomegaly.  Psych: Alert and oriented x3, normal affect and mood.        Assessment and Plan:   The following treatment plan was discussed    1. Intractable back pain  - lidocaine (LIDODERM) 5 % Patch; Place 1 Patch on the skin every 24 hours.  Dispense: 10 Patch; Refill: 0    Other orders  - diclofenac sodium (VOLTAREN) 1 % Gel; Apply 2 g topically 1 time a day as needed (pain).  Dispense: 50 g; Refill: 0  - methocarbamol (ROBAXIN) 500 MG Tab; Take 1 Tablet by mouth 3 times a day.  Dispense: 90 Tablet; Refill: 3  - lidocaine (XYLOCAINE) 2 % injection 10 mL      Followup: No follow-ups on file.

## 2023-06-30 ENCOUNTER — PATIENT OUTREACH (OUTPATIENT)
Dept: HEALTH INFORMATION MANAGEMENT | Facility: OTHER | Age: 73
End: 2023-06-30
Payer: MEDICARE

## 2023-06-30 DIAGNOSIS — N18.31 CHRONIC KIDNEY DISEASE, STAGE 3A: ICD-10-CM

## 2023-06-30 DIAGNOSIS — E78.5 DYSLIPIDEMIA: ICD-10-CM

## 2023-07-03 ENCOUNTER — PATIENT OUTREACH (OUTPATIENT)
Dept: HEALTH INFORMATION MANAGEMENT | Facility: OTHER | Age: 73
End: 2023-07-03
Payer: MEDICARE

## 2023-07-03 DIAGNOSIS — N18.31 CHRONIC KIDNEY DISEASE, STAGE 3A: ICD-10-CM

## 2023-07-03 DIAGNOSIS — E78.5 DYSLIPIDEMIA: ICD-10-CM

## 2023-07-03 DIAGNOSIS — Z79.01 LONG TERM (CURRENT) USE OF ANTICOAGULANTS: ICD-10-CM

## 2023-07-05 SDOH — ECONOMIC STABILITY: INCOME INSECURITY: IN THE LAST 12 MONTHS, WAS THERE A TIME WHEN YOU WERE NOT ABLE TO PAY THE MORTGAGE OR RENT ON TIME?: NO

## 2023-07-05 SDOH — ECONOMIC STABILITY: INCOME INSECURITY: HOW HARD IS IT FOR YOU TO PAY FOR THE VERY BASICS LIKE FOOD, HOUSING, MEDICAL CARE, AND HEATING?: NOT HARD AT ALL

## 2023-07-05 SDOH — ECONOMIC STABILITY: FOOD INSECURITY: WITHIN THE PAST 12 MONTHS, THE FOOD YOU BOUGHT JUST DIDN'T LAST AND YOU DIDN'T HAVE MONEY TO GET MORE.: NEVER TRUE

## 2023-07-05 SDOH — ECONOMIC STABILITY: FOOD INSECURITY: WITHIN THE PAST 12 MONTHS, YOU WORRIED THAT YOUR FOOD WOULD RUN OUT BEFORE YOU GOT MONEY TO BUY MORE.: NEVER TRUE

## 2023-07-05 SDOH — HEALTH STABILITY: PHYSICAL HEALTH: ON AVERAGE, HOW MANY MINUTES DO YOU ENGAGE IN EXERCISE AT THIS LEVEL?: 30 MIN

## 2023-07-05 SDOH — ECONOMIC STABILITY: TRANSPORTATION INSECURITY
IN THE PAST 12 MONTHS, HAS LACK OF RELIABLE TRANSPORTATION KEPT YOU FROM MEDICAL APPOINTMENTS, MEETINGS, WORK OR FROM GETTING THINGS NEEDED FOR DAILY LIVING?: NO

## 2023-07-05 SDOH — ECONOMIC STABILITY: HOUSING INSECURITY: IN THE LAST 12 MONTHS, HOW MANY PLACES HAVE YOU LIVED?: 1

## 2023-07-05 SDOH — HEALTH STABILITY: MENTAL HEALTH
STRESS IS WHEN SOMEONE FEELS TENSE, NERVOUS, ANXIOUS, OR CAN'T SLEEP AT NIGHT BECAUSE THEIR MIND IS TROUBLED. HOW STRESSED ARE YOU?: RATHER MUCH

## 2023-07-05 SDOH — HEALTH STABILITY: PHYSICAL HEALTH: ON AVERAGE, HOW MANY DAYS PER WEEK DO YOU ENGAGE IN MODERATE TO STRENUOUS EXERCISE (LIKE A BRISK WALK)?: 1 DAY

## 2023-07-05 ASSESSMENT — SOCIAL DETERMINANTS OF HEALTH (SDOH)
HOW OFTEN DO YOU ATTENT MEETINGS OF THE CLUB OR ORGANIZATION YOU BELONG TO?: NEVER
HOW HARD IS IT FOR YOU TO PAY FOR THE VERY BASICS LIKE FOOD, HOUSING, MEDICAL CARE, AND HEATING?: NOT HARD AT ALL
HOW OFTEN DO YOU ATTENT MEETINGS OF THE CLUB OR ORGANIZATION YOU BELONG TO?: NEVER
HOW OFTEN DO YOU GET TOGETHER WITH FRIENDS OR RELATIVES?: THREE TIMES A WEEK
HOW OFTEN DO YOU HAVE A DRINK CONTAINING ALCOHOL: 2-4 TIMES A MONTH
WITHIN THE PAST 12 MONTHS, YOU WORRIED THAT YOUR FOOD WOULD RUN OUT BEFORE YOU GOT THE MONEY TO BUY MORE: NEVER TRUE
HOW OFTEN DO YOU ATTEND CHURCH OR RELIGIOUS SERVICES?: 1 TO 4 TIMES PER YEAR
DO YOU BELONG TO ANY CLUBS OR ORGANIZATIONS SUCH AS CHURCH GROUPS UNIONS, FRATERNAL OR ATHLETIC GROUPS, OR SCHOOL GROUPS?: NO
HOW OFTEN DO YOU GET TOGETHER WITH FRIENDS OR RELATIVES?: THREE TIMES A WEEK
HOW OFTEN DO YOU HAVE SIX OR MORE DRINKS ON ONE OCCASION: NEVER
DO YOU BELONG TO ANY CLUBS OR ORGANIZATIONS SUCH AS CHURCH GROUPS UNIONS, FRATERNAL OR ATHLETIC GROUPS, OR SCHOOL GROUPS?: NO
IN A TYPICAL WEEK, HOW MANY TIMES DO YOU TALK ON THE PHONE WITH FAMILY, FRIENDS, OR NEIGHBORS?: MORE THAN THREE TIMES A WEEK
IN A TYPICAL WEEK, HOW MANY TIMES DO YOU TALK ON THE PHONE WITH FAMILY, FRIENDS, OR NEIGHBORS?: MORE THAN THREE TIMES A WEEK
HOW OFTEN DO YOU ATTEND CHURCH OR RELIGIOUS SERVICES?: 1 TO 4 TIMES PER YEAR
HOW MANY DRINKS CONTAINING ALCOHOL DO YOU HAVE ON A TYPICAL DAY WHEN YOU ARE DRINKING: 1 OR 2

## 2023-07-05 ASSESSMENT — LIFESTYLE VARIABLES
HOW OFTEN DO YOU HAVE SIX OR MORE DRINKS ON ONE OCCASION: NEVER
AUDIT-C TOTAL SCORE: 2
SKIP TO QUESTIONS 9-10: 1
HOW MANY STANDARD DRINKS CONTAINING ALCOHOL DO YOU HAVE ON A TYPICAL DAY: 1 OR 2
HOW OFTEN DO YOU HAVE A DRINK CONTAINING ALCOHOL: 2-4 TIMES A MONTH

## 2023-07-06 ENCOUNTER — OFFICE VISIT (OUTPATIENT)
Dept: MEDICAL GROUP | Facility: PHYSICIAN GROUP | Age: 73
End: 2023-07-06
Payer: MEDICARE

## 2023-07-06 VITALS
RESPIRATION RATE: 18 BRPM | WEIGHT: 165 LBS | HEIGHT: 63 IN | BODY MASS INDEX: 29.23 KG/M2 | HEART RATE: 74 BPM | OXYGEN SATURATION: 97 % | DIASTOLIC BLOOD PRESSURE: 64 MMHG | TEMPERATURE: 98.2 F | SYSTOLIC BLOOD PRESSURE: 120 MMHG

## 2023-07-06 DIAGNOSIS — K58.0 IRRITABLE BOWEL SYNDROME WITH DIARRHEA: ICD-10-CM

## 2023-07-06 DIAGNOSIS — E88.01 ALPHA-1-ANTITRYPSIN DEFICIENCY (HCC): ICD-10-CM

## 2023-07-06 DIAGNOSIS — Z78.0 MENOPAUSE: ICD-10-CM

## 2023-07-06 DIAGNOSIS — I83.93 VARICOSE VEINS OF BOTH LOWER EXTREMITIES, UNSPECIFIED WHETHER COMPLICATED: ICD-10-CM

## 2023-07-06 DIAGNOSIS — E78.5 DYSLIPIDEMIA: ICD-10-CM

## 2023-07-06 DIAGNOSIS — M15.9 PRIMARY OSTEOARTHRITIS INVOLVING MULTIPLE JOINTS: ICD-10-CM

## 2023-07-06 DIAGNOSIS — F32.9 REACTIVE DEPRESSION: ICD-10-CM

## 2023-07-06 DIAGNOSIS — M25.552 BILATERAL HIP PAIN: ICD-10-CM

## 2023-07-06 DIAGNOSIS — D68.51 FACTOR V LEIDEN MUTATION (HCC): ICD-10-CM

## 2023-07-06 DIAGNOSIS — R79.89 ABNORMAL CBC: ICD-10-CM

## 2023-07-06 DIAGNOSIS — Z79.01 LONG TERM (CURRENT) USE OF ANTICOAGULANTS: ICD-10-CM

## 2023-07-06 DIAGNOSIS — K64.8 OTHER HEMORRHOIDS: ICD-10-CM

## 2023-07-06 DIAGNOSIS — M51.37 DEGENERATION OF LUMBAR OR LUMBOSACRAL INTERVERTEBRAL DISC: ICD-10-CM

## 2023-07-06 DIAGNOSIS — R10.9 RIGHT FLANK PAIN: ICD-10-CM

## 2023-07-06 DIAGNOSIS — E55.9 VITAMIN D DEFICIENCY: ICD-10-CM

## 2023-07-06 DIAGNOSIS — R20.8 BURNING SENSATION: ICD-10-CM

## 2023-07-06 DIAGNOSIS — N18.31 CHRONIC KIDNEY DISEASE, STAGE 3A: ICD-10-CM

## 2023-07-06 DIAGNOSIS — Z86.711 HISTORY OF PULMONARY EMBOLUS (PE): ICD-10-CM

## 2023-07-06 DIAGNOSIS — M25.551 BILATERAL HIP PAIN: ICD-10-CM

## 2023-07-06 DIAGNOSIS — M85.80 OSTEOPENIA, UNSPECIFIED LOCATION: ICD-10-CM

## 2023-07-06 DIAGNOSIS — K21.00 GASTROESOPHAGEAL REFLUX DISEASE WITH ESOPHAGITIS WITHOUT HEMORRHAGE: ICD-10-CM

## 2023-07-06 PROBLEM — R07.89 CHEST PRESSURE: Status: RESOLVED | Noted: 2017-06-27 | Resolved: 2023-07-06

## 2023-07-06 PROBLEM — R10.13 EPIGASTRIC PRESSURE: Status: RESOLVED | Noted: 2017-03-03 | Resolved: 2023-07-06

## 2023-07-06 PROBLEM — M54.9 INTRACTABLE BACK PAIN: Status: RESOLVED | Noted: 2023-05-29 | Resolved: 2023-07-06

## 2023-07-06 PROBLEM — I80.02 SUPERFICIAL THROMBOPHLEBITIS OF LEFT LEG: Status: RESOLVED | Noted: 2017-10-20 | Resolved: 2023-07-06

## 2023-07-06 PROBLEM — H53.9 VISION CHANGES: Status: RESOLVED | Noted: 2017-09-28 | Resolved: 2023-07-06

## 2023-07-06 PROBLEM — L84 CORN OF FOOT: Status: RESOLVED | Noted: 2021-05-06 | Resolved: 2023-07-06

## 2023-07-06 PROCEDURE — G0439 PPPS, SUBSEQ VISIT: HCPCS | Performed by: FAMILY MEDICINE

## 2023-07-06 PROCEDURE — 3078F DIAST BP <80 MM HG: CPT | Performed by: FAMILY MEDICINE

## 2023-07-06 PROCEDURE — 3074F SYST BP LT 130 MM HG: CPT | Performed by: FAMILY MEDICINE

## 2023-07-06 RX ORDER — ATORVASTATIN CALCIUM 10 MG/1
10 TABLET, FILM COATED ORAL NIGHTLY
Qty: 90 TABLET | Refills: 3 | Status: SHIPPED | OUTPATIENT
Start: 2023-07-06 | End: 2023-07-06

## 2023-07-06 ASSESSMENT — PATIENT HEALTH QUESTIONNAIRE - PHQ9
5. POOR APPETITE OR OVEREATING: 0 - NOT AT ALL
SUM OF ALL RESPONSES TO PHQ QUESTIONS 1-9: 5
CLINICAL INTERPRETATION OF PHQ2 SCORE: 2

## 2023-07-06 ASSESSMENT — FIBROSIS 4 INDEX: FIB4 SCORE: 1.49

## 2023-07-06 ASSESSMENT — ACTIVITIES OF DAILY LIVING (ADL): BATHING_REQUIRES_ASSISTANCE: 0

## 2023-07-06 ASSESSMENT — ENCOUNTER SYMPTOMS: GENERAL WELL-BEING: GOOD

## 2023-07-06 NOTE — ASSESSMENT & PLAN NOTE
The 10-year ASCVD risk score (Luis HAM, et al., 2019) is: 11.5%  Restarted statin: changed from crestor to lipitor 10 mg.

## 2023-07-06 NOTE — ASSESSMENT & PLAN NOTE
Chronic condition mostly affecting base of thumbs  Pain comes and goes  Improved with topical voltaren gel

## 2023-07-06 NOTE — ASSESSMENT & PLAN NOTE
Continues on xarelto with no adverse bleeding events.   Pt had a PE in the past, also had superficial throbophlebitis

## 2023-07-06 NOTE — PROGRESS NOTES
Chief Complaint   Patient presents with    Annual Exam       HPI:  Hermelinda Mosley is a 73 y.o. here for Medicare Annual Wellness Visit. She has no other concerns.    Patient Active Problem List    Diagnosis Date Noted    History of pulmonary embolus (PE) 07/06/2023    Constipation 05/29/2023    Urinary retention 05/29/2023    Dyslipidemia 08/30/2022    Reactive depression     Burning sensation 01/07/2021    Bilateral hip pain 01/07/2021    Alpha-1-antitrypsin deficiency (HCC) 12/05/2018    Obesity (BMI 30-39.9) 12/19/2017    Long term (current) use of anticoagulants [Z79.01] 09/22/2017    Varicose veins of legs 10/17/2016    Primary osteoarthritis involving multiple joints 04/05/2016    Factor V Leiden mutation (HCC) 04/05/2016    Chronic kidney disease, stage 3a ([facility])     Vitamin D deficiency disease 01/05/2016    Osteopenia 09/10/2013    Other hemorrhoids 07/31/2009    Esophageal reflux 07/31/2009    Degeneration of lumbar or lumbosacral intervertebral disc 07/31/2009    Right flank pain 07/31/2009    Irritable bowel syndrome 07/31/2009       Current Outpatient Medications   Medication Sig Dispense Refill    lidocaine (LIDODERM) 5 % Patch Place 1 Patch on the skin every 24 hours. 10 Patch 0    diclofenac sodium (VOLTAREN) 1 % Gel Apply 2 g topically 1 time a day as needed (pain). 50 g 0    methocarbamol (ROBAXIN) 500 MG Tab Take 1 Tablet by mouth 3 times a day. 90 Tablet 3    CALCIUM PO Take 1 Tablet by mouth every day.      omeprazole (PRILOSEC) 20 MG delayed-release capsule Take 1 Capsule by mouth every day. 90 Capsule 3    rivaroxaban (XARELTO) 10 MG Tab tablet Take 1 Tablet by mouth with dinner. 90 Tablet 3    Multiple Vitamins-Minerals (MULTIVITAMIN PO) Take 1 Tablet by mouth every day.      Cholecalciferol (VITAMIN D PO) Take  by mouth.       No current facility-administered medications for this visit.          Current supplements as per medication list.     Allergies: Patient has no allergy information  on record.    Current social contact/activities: reading     She  reports that she quit smoking about 54 years ago. Her smoking use included cigarettes. She has a 1.00 pack-year smoking history. She has never used smokeless tobacco. She reports current alcohol use. She reports that she does not use drugs.  Counseling given: Not Answered  Tobacco comments: only smokes 1-2 cigs a couple times a week for 1-2 years      ROS:    Gait: Uses no assistive device  Ostomy: No  Other tubes: No  Amputations: No  Chronic oxygen use: No  Last eye exam: 6 m.o ago  Wears hearing aids: Yes   : Denies any urinary leakage during the last 6 months    Screening:    Depression Screening  Little interest or pleasure in doing things?  1 - several days  Feeling down, depressed , or hopeless? 1 - several days  Trouble falling or staying asleep, or sleeping too much?  1 - several days  Feeling tired or having little energy?  1 - several days  Poor appetite or overeating?  0 - not at all  Feeling bad about yourself - or that you are a failure or have let yourself or your family down? 1 - several days  Trouble concentrating on things, such as reading the newspaper or watching television? 0 - not at all  Moving or speaking so slowly that other people could have noticed.  Or the opposite - being so fidgety or restless that you have been moving around a lot more than usual?  0 - not at all  Thoughts that you would be better off dead, or of hurting yourself?  0 - not at all  Patient Health Questionnaire Score: 5    If depressive symptoms identified deferred to follow up visit unless specifically addressed in assessment and plan.    Interpretation of PHQ-9 Total Score   Score Severity   1-4 No Depression   5-9 Mild Depression   10-14 Moderate Depression   15-19 Moderately Severe Depression   20-27 Severe Depression    Screening for Cognitive Impairment  Three Minute Recall (Banana, Sunrise, Chair) 3/3    Venancio clock face with all 12 numbers and set  the hands to show 20 past 8.  Yes    Cognitive concerns identified deferred for follow up unless specifically addressed in assessment and plan.    Fall Risk Assessment  Has the patient had two or more falls in the last year or any fall with injury in the last year?  No    Safety Assessment  Throw rugs on floor.  Yes  Handrails on all stairs.  Yes  Good lighting in all hallways.  Yes  Difficulty hearing.  Yes  Patient counseled about all safety risks that were identified.    Functional Assessment ADLs  Are there any barriers preventing you from cooking for yourself or meeting nutritional needs?  No.    Are there any barriers preventing you from driving safely or obtaining transportation?  No.    Are there any barriers preventing you from using a telephone or calling for help?  No    Are there any barriers preventing you from shopping?  No.    Are there any barriers preventing you from taking care of your own finances?  No    Are there any barriers preventing you from managing your medications?  No    Are there any barriers preventing you from showering, bathing or dressing yourself? No    Are you currently engaging in any exercise or physical activity?  Yes.    What is your perception of your health? Good    Advance Care Planning  Do you have an Advance Directive, Living Will, Durable Power of , or POLST? No                 Health Maintenance Summary            Overdue - COVID-19 Vaccine (2 - Pfizer series) Overdue since 2/4/2023      10/04/2022  Imm Admin: MODERNA BIVALENT BOOSTER SARS-COV-2 VACCINE (6+)    04/22/2022  Imm Admin: MODERNA SARS-COV-2 VACCINE (12+)    10/24/2021  Imm Admin: MODERNA SARS-COV-2 VACCINE (12+)    02/21/2021  Imm Admin: MODERNA SARS-COV-2 VACCINE (12+)    01/24/2021  Imm Admin: MODERNA SARS-COV-2 VACCINE (12+)              IMM INFLUENZA (1) Next due on 9/1/2023      10/04/2022  Imm Admin: Influenza Vaccine Adult HD    09/28/2021  Imm Admin: Influenza Vaccine Adult HD    09/23/2020   Imm Admin: Influenza Vaccine Adult HD    10/03/2019  Imm Admin: Influenza Vaccine Adult HD    09/05/2018  Imm Admin: Influenza Vaccine Adult HD    Only the first 5 history entries have been loaded, but more history exists.              Ordered - BONE DENSITY (Every 5 Years) Ordered on 7/6/2023 09/19/2018  DS-BONE DENSITY STUDY (DEXA)    01/29/2016  DS-BONE DENSITY STUDY (DEXA)    06/12/2013  DS-BONE DENSITY STUDY (DEXA)              IMM DTaP/Tdap/Td Vaccine (2 - Td or Tdap) Next due on 2/11/2024 02/11/2014  Imm Admin: Tdap Vaccine              Annual Wellness Visit (Every 366 Days) Next due on 7/6/2024 07/06/2023  Subsequent Annual Wellness Visit - Includes PPPS ()    06/26/2017  Visit Dx: Medicare annual wellness visit, initial              MAMMOGRAM (Every 2 Years) Next due on 9/28/2024 09/28/2022  MA-SCREENING MAMMO BILAT W/TOMOSYNTHESIS W/CAD    09/27/2021  MA-SCREENING MAMMO BILAT W/TOMOSYNTHESIS W/CAD    09/17/2020  MA-SCREENING MAMMO BILAT W/TOMOSYNTHESIS W/CAD    08/27/2019  MA-SCREENING MAMMO BILAT W/TOMOSYNTHESIS W/CAD    08/20/2018  MA-SCREEN MAMMO W/CAD-BILAT    Only the first 5 history entries have been loaded, but more history exists.              COLORECTAL CANCER SCREENING (COLONOSCOPY - Every 10 Years) Next due on 6/14/2026 06/14/2016  AMB REFERRAL TO GI FOR COLONOSCOPY              IMM PNEUMOCOCCAL VACCINE: 65+ Years (Series Information) Completed      01/27/2017  Imm Admin: Pneumococcal polysaccharide vaccine (PPSV-23)    01/06/2016  Imm Admin: Pneumococcal Conjugate Vaccine (Prevnar/PCV-13)              HEPATITIS C SCREENING  Completed      02/24/2020  Hepatitis C Antibody component of HEP C VIRUS ANTIBODY              IMM HEP B VACCINE (Series Information) Completed      02/25/2020  Imm Admin: Hepatitis B Vaccine (Adol/Adult)    09/17/2019  Imm Admin: Hepatitis B Vaccine (Adol/Adult)    08/08/2019  Imm Admin: Hepatitis B Vaccine (Adol/Adult)              HPV  Vaccines (Series Information) Aged Out      No completion history exists for this topic.              IMM MENINGOCOCCAL ACWY VACCINE (Series Information) Aged Out      No completion history exists for this topic.              Discontinued - CERVICAL CANCER SCREENING  Discontinued        Frequency changed to Never automatically (Topic No Longer Applies)    11/10/2020  PAP Only    2018  PAP IG (IMAGE GUIDED)              Discontinued - IMM ZOSTER VACCINES  Discontinued      2019  Imm Admin: Zoster Vaccine Recombinant (RZV) (SHINGRIX)    2019  Imm Admin: Zoster Vaccine Recombinant (RZV) (SHINGRIX)    2014  Imm Admin: Zoster Vaccine Live (ZVL) (Zostavax) - HISTORICAL DATA                    Patient Care Team:  Maribel Long M.D. as PCP - General (Family Medicine)  Timothy Benjamin M.D. as Consulting Physician (Gastroenterology)  Marly Guy R.N. as RN Care Coordinator       Social History     Tobacco Use    Smoking status: Former     Packs/day: 0.25     Years: 4.00     Pack years: 1.00     Types: Cigarettes     Quit date: 1969     Years since quittin.5    Smokeless tobacco: Never    Tobacco comments:     only smokes 1-2 cigs a couple times a week for 1-2 years   Vaping Use    Vaping Use: Never used   Substance Use Topics    Alcohol use: Yes     Alcohol/week: 0.0 - 1.2 oz     Comment: occ    Drug use: No     Family History   Problem Relation Age of Onset    Hypertension Mother     Diabetes Mother         pre diabetes    Diabetes Father     Hypertension Father     Kidney Disease Father     Respiratory Disease Brother      She  has a past medical history of Abdominal pain, unspecified site, Alpha 1-antitrypsin PiMS phenotype, Back pain, Bronchitis, Chickenpox, Degeneration of lumbar or lumbosacral intervertebral disc, Diarrhea, DVT (deep venous thrombosis) (HCC), Esophageal reflux, Sami measles, Hernia of unspecified site of abdominal cavity without mention of obstruction  "or gangrene (repaired), History of colonoscopy (2005=normal), Influenza, Irritable bowel syndrome, Mumps, Pap smear (due), Pneumonia, Recurrent UTI (9/4/2009), Restless leg syndrome, Screening mammogram (10/30/2008=normal), Superficial thrombophlebitis of left leg (10/20/2017), Swelling (9/4/2009), Tonsillitis, Unspecified hemorrhoids without mention of complication, and Vein, varicose (stripped).    She has no past medical history of Encounter for long-term (current) use of other medications.   Past Surgical History:   Procedure Laterality Date    CHOLECYSTECTOMY      HERNIA REPAIR      VEIN STRIPPING         Exam:   /64   Pulse 74   Temp 36.8 °C (98.2 °F) (Temporal)   Resp 18   Ht 1.6 m (5' 3\")   Wt 74.8 kg (165 lb)   SpO2 97%  Body mass index is 29.23 kg/m².    Hearing fair.    Dentition good  Alert, oriented in no acute distress.  Eye contact is good, speech goal directed, affect calm; wears reading glasses; last eye exam 6 months    Assessment and Plan. The following treatment and monitoring plan is recommended:    1. Other hemorrhoids  - Subsequent Annual Wellness Visit - Includes PPPS ()    2. Gastroesophageal reflux disease with esophagitis without hemorrhage  - Subsequent Annual Wellness Visit - Includes PPPS ()    3. Degeneration of lumbar or lumbosacral intervertebral disc  - Subsequent Annual Wellness Visit - Includes PPPS ()    4. Right flank pain  - Subsequent Annual Wellness Visit - Includes PPPS ()    5. Irritable bowel syndrome with diarrhea  - Subsequent Annual Wellness Visit - Includes PPPS ()    6. Menopause  - DS-BONE DENSITY STUDY (DEXA); Future  - Subsequent Annual Wellness Visit - Includes PPPS ()    7. Osteopenia, unspecified location  - Subsequent Annual Wellness Visit - Includes PPPS ()    8. Vitamin D deficiency disease  - Subsequent Annual Wellness Visit - Includes PPPS ()    9. Primary osteoarthritis involving multiple joints  - " Subsequent Annual Wellness Visit - Includes PPPS ()    10. Chronic kidney disease, stage 3a (HCC)  - Subsequent Annual Wellness Visit - Includes PPPS ()    11. Factor V Leiden mutation (HCC)  - Subsequent Annual Wellness Visit - Includes PPPS ()    12. Varicose veins of both lower extremities, unspecified whether complicated  - Subsequent Annual Wellness Visit - Includes PPPS ()    13. Long term (current) use of anticoagulants [Z79.01]  - Subsequent Annual Wellness Visit - Includes PPPS ()    14. Alpha-1-antitrypsin deficiency (HCC)  - Subsequent Annual Wellness Visit - Includes PPPS ()    15. Burning sensation  - Subsequent Annual Wellness Visit - Includes PPPS ()    16. Bilateral hip pain  - Subsequent Annual Wellness Visit - Includes PPPS ()    17. History of pulmonary embolus (PE)  - Subsequent Annual Wellness Visit - Includes PPPS ()    18. Reactive depression  - Subsequent Annual Wellness Visit - Includes PPPS ()    19. Dyslipidemia  - Comp Metabolic Panel; Future  - Lipid Profile; Future  - Subsequent Annual Wellness Visit - Includes PPPS ()    20. Abnormal CBC  - CBC WITH DIFFERENTIAL; Future  - Subsequent Annual Wellness Visit - Includes PPPS ()      Services suggested: No services needed at this time  Health Care Screening: Age-appropriate preventive services recommended by USPTF and ACIP covered by Medicare were discussed today. Services ordered if indicated and agreed upon by the patient.  Referrals offered: Community-based lifestyle interventions to reduce health risks and promote self-management and wellness, fall prevention, nutrition, physical activity, tobacco-use cessation, weight loss, and mental health services as per orders if indicated.    Discussion today about general wellness and lifestyle habits:    Prevent falls and reduce trip hazards; Cautioned about securing or removing rugs.  Have a working fire alarm and carbon monoxide  detector;   Engage in regular physical activity and social activities     Follow-up: Return in about 6 months (around 1/6/2024).

## 2023-07-06 NOTE — ASSESSMENT & PLAN NOTE
Due to her son Juan Pablo's severe depression.   She is established with counseling. She was on cymbalta in the past and not on it currently.

## 2023-07-13 DIAGNOSIS — Z78.0 MENOPAUSE: ICD-10-CM

## 2023-07-19 ENCOUNTER — PATIENT OUTREACH (OUTPATIENT)
Dept: HEALTH INFORMATION MANAGEMENT | Facility: OTHER | Age: 73
End: 2023-07-19
Payer: MEDICARE

## 2023-07-19 DIAGNOSIS — N18.31 CHRONIC KIDNEY DISEASE, STAGE 3A: ICD-10-CM

## 2023-07-19 DIAGNOSIS — Z86.711 HISTORY OF PULMONARY EMBOLUS (PE): ICD-10-CM

## 2023-07-19 DIAGNOSIS — E78.5 DYSLIPIDEMIA: ICD-10-CM

## 2023-07-19 NOTE — PROGRESS NOTES
07/25/23 1348  Spoke with patient for monthly outreach. She reports that she has had a tough week with her son who has dementia, as it seems to be progressing. She is seeing a therapist for this, and has tremendous support from her daughters. She states that she is getting cramps in her legs and arms and a burning feeling in her head.  RN instructed her to eat potassium rich food. She also stated that she is not drinking enough water. Hermelinda inquired about magnesium to see if that would help. This RN will collaborate with provider regarding this supplement. Hermelinda does not want to try gabapentin that was offered to her at one of her office visits. She has a dexascan scheduled for 8/16/23 and waiting for insurance approval. Medication  and allergies reviewed with patient and she reports no changes. Patient has no other concerns. Instructed her to call if any questions or concerns.     Per Dr. Long: She can use an otc mg supplement follow the bottle directions for dosing, stay well hydrated and start some daily stretching for cramping.    Next Monthly outreach 8/25

## 2023-07-25 ENCOUNTER — PATIENT OUTREACH (OUTPATIENT)
Dept: HEALTH INFORMATION MANAGEMENT | Facility: OTHER | Age: 73
End: 2023-07-25
Payer: MEDICARE

## 2023-07-25 DIAGNOSIS — Z86.711 HISTORY OF PULMONARY EMBOLUS (PE): ICD-10-CM

## 2023-07-25 DIAGNOSIS — I10 HYPERTENSION, UNSPECIFIED TYPE: ICD-10-CM

## 2023-07-25 DIAGNOSIS — N18.31 CHRONIC KIDNEY DISEASE, STAGE 3A: ICD-10-CM

## 2023-07-25 DIAGNOSIS — E78.5 DYSLIPIDEMIA: ICD-10-CM

## 2023-07-26 ENCOUNTER — PATIENT OUTREACH (OUTPATIENT)
Dept: HEALTH INFORMATION MANAGEMENT | Facility: OTHER | Age: 73
End: 2023-07-26
Payer: MEDICARE

## 2023-07-26 DIAGNOSIS — Z79.01 LONG TERM (CURRENT) USE OF ANTICOAGULANTS: ICD-10-CM

## 2023-07-26 DIAGNOSIS — E78.5 DYSLIPIDEMIA: ICD-10-CM

## 2023-07-26 DIAGNOSIS — N18.31 CHRONIC KIDNEY DISEASE, STAGE 3A: ICD-10-CM

## 2023-08-03 ENCOUNTER — PATIENT MESSAGE (OUTPATIENT)
Dept: HEALTH INFORMATION MANAGEMENT | Facility: OTHER | Age: 73
End: 2023-08-03

## 2023-08-16 ENCOUNTER — HOSPITAL ENCOUNTER (OUTPATIENT)
Dept: RADIOLOGY | Facility: MEDICAL CENTER | Age: 73
End: 2023-08-16
Attending: FAMILY MEDICINE
Payer: MEDICARE

## 2023-08-16 DIAGNOSIS — Z78.0 MENOPAUSE: ICD-10-CM

## 2023-08-16 PROCEDURE — 77080 DXA BONE DENSITY AXIAL: CPT

## 2023-08-24 ENCOUNTER — PATIENT OUTREACH (OUTPATIENT)
Dept: HEALTH INFORMATION MANAGEMENT | Facility: OTHER | Age: 73
End: 2023-08-24
Payer: MEDICARE

## 2023-08-24 DIAGNOSIS — N18.31 CHRONIC KIDNEY DISEASE, STAGE 3A: ICD-10-CM

## 2023-08-24 DIAGNOSIS — E78.5 DYSLIPIDEMIA: ICD-10-CM

## 2023-08-24 NOTE — PROGRESS NOTES
This RN spoke with patient for monthly outreach. She states that she is doing ok. Her stress level is about the same as last month due to her son having early onset Alzheimer's. She had a bone density test and shows osteopenia and reports that she is not exercising. This RN educated patient on the importance of exercise to increase bone density. She just got magnesium and has not started it yet and hoping it will help with cramps at night. She was prescribed Voltaren at last visit  for back pain and when she went to the pharmacy to pick it up, they did not have it in stock. This RN instructed her to go back to the pharmacy to check if they have it. She has used her husbands and it is effective in managing her back pain. She reports that at last hospitalization in May 2023 she was referred to Neurosurgery for consultation regarding back pain and has not made an appointment yet. She would like other alternatives prior to making an appointment. She did go to outpatient physical therapy and states that it was not effective and they gave her exercises to do at home. She was inquiring about chiropractor as an alternative.RN offered her an appointment with PCP on 9/15, but she declined as she has to take her  to 3 appointments that day. Instructed her to call this RN back if regimen is ineffective.  All medications and allergies reviewed with patient with no changes. Hermelinda verbalizes understanding regarding all education.     Assessment    See Above    Education    Exercise, medications    Plan of Care and Goals    Continue current POC    Barriers:    Main caregiver for  and son. Stress    Progress:    Progressing    Next outreach: 9/24/23

## 2023-09-01 ENCOUNTER — TELEPHONE (OUTPATIENT)
Dept: URGENT CARE | Facility: PHYSICIAN GROUP | Age: 73
End: 2023-09-01
Payer: MEDICARE

## 2023-09-01 ENCOUNTER — PATIENT OUTREACH (OUTPATIENT)
Dept: HEALTH INFORMATION MANAGEMENT | Facility: OTHER | Age: 73
End: 2023-09-01
Payer: MEDICARE

## 2023-09-01 DIAGNOSIS — E78.5 DYSLIPIDEMIA: ICD-10-CM

## 2023-09-01 DIAGNOSIS — N18.31 CHRONIC KIDNEY DISEASE, STAGE 3A: ICD-10-CM

## 2023-09-01 DIAGNOSIS — K21.00 GASTROESOPHAGEAL REFLUX DISEASE WITH ESOPHAGITIS WITHOUT HEMORRHAGE: ICD-10-CM

## 2023-09-01 NOTE — PROGRESS NOTES
09/01/23 This RN received phone call from patient and she stated that she is scheduled for an endoscopy on 9/6/23 at [facility]. She stated that [facility] needs an order when hold Xaralto prior to procedure and when to resume after the procedure. Hermelinda reports that she will reschedule endoscopy at a later date after the clinic receives the order. [facility]'s phone number is [phone]. This RN instructed patient to call with new date after the holiday and to call RN if any further questions or concerns.     9/12/23 -This RN spoke with patient regarding order for holding Xaralto prior to endoscopy. She had cancelled the appointment because [facility] had not received an order for instructions. Patient stated that at last cardiology appointment she was instructed to hold the Xaralto 3 days prior to procedure. Per patient cardiologist stated that PCP can manage prescriptions. Patient is not being seen by Pharmacy services at this time.

## 2023-09-19 ENCOUNTER — PATIENT OUTREACH (OUTPATIENT)
Dept: HEALTH INFORMATION MANAGEMENT | Facility: OTHER | Age: 73
End: 2023-09-19
Payer: MEDICARE

## 2023-09-19 DIAGNOSIS — N18.31 CHRONIC KIDNEY DISEASE, STAGE 3A: ICD-10-CM

## 2023-09-19 DIAGNOSIS — Z86.711 HISTORY OF PULMONARY EMBOLUS (PE): ICD-10-CM

## 2023-09-19 DIAGNOSIS — E78.5 DYSLIPIDEMIA: ICD-10-CM

## 2023-09-19 RX ORDER — MAGNESIUM OXIDE 400 MG/1
250 TABLET ORAL
COMMUNITY
Start: 2023-09-04

## 2023-09-19 NOTE — PROGRESS NOTES
"This RN spoke with patient for monthly/quarterly outreach. 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 She did reschedule her procedure for 10/3/23. She is taking OTC Pepcid every other day instead of omeprazole to if it is effective in managing symptoms. She saw Neurosurgery for her back pain and was recommended that she get a steroid injection, but she is going to schedule an appointment with chiropractor first. She has been taking magnesium every day for approximately 2 weeks and the cramping in her legs have dissipated, but her legs are \"achy\" now. Patient states that is could be related to the back pain. She is going to try alternative treatments before she makes a decision regarding injection in spine. Patient requested earlier appointment with provider but there are not any appointments available sooner than January 2024. This RN offered to place her on waiting list and she declined. This RN instructed her to call CCM number if further questions or concerns. She verbalizes understanding.     Assessment    See above    Education    Medications, appointments    Plan of Care and Goals    Patient's goal is to manage back pain and obtaining sooner appointment with PCP for follow up and to continue plan of care.    Barriers:     Patient wishes to try other alternatives for back pain.     Progress:    Progressing    Next outreach: Week of 10/16/23-10/20/23                           "

## 2023-10-10 ENCOUNTER — PATIENT OUTREACH (OUTPATIENT)
Dept: HEALTH INFORMATION MANAGEMENT | Facility: OTHER | Age: 73
End: 2023-10-10
Payer: MEDICARE

## 2023-10-10 DIAGNOSIS — N18.31 CHRONIC KIDNEY DISEASE, STAGE 3A: ICD-10-CM

## 2023-10-10 DIAGNOSIS — Z86.711 HISTORY OF PULMONARY EMBOLUS (PE): ICD-10-CM

## 2023-10-10 DIAGNOSIS — E78.5 DYSLIPIDEMIA: ICD-10-CM

## 2023-10-10 DIAGNOSIS — I83.93 VARICOSE VEINS OF BOTH LOWER EXTREMITIES, UNSPECIFIED WHETHER COMPLICATED: ICD-10-CM

## 2023-10-10 NOTE — PROGRESS NOTES
This RN received a phone call from patient inquiring about COVID AND RSV vaccinations. This RN instructed her to go to a local pharmacy as this clinic does not do them. She also stated that she is having more swelling and pain at ankle radiating into lower calf x 2 weeks. This RN instructed her to go to  for evaluation. She denies redness or warmth to the touch. She verbalizes understanding

## 2023-10-17 ENCOUNTER — HOSPITAL ENCOUNTER (OUTPATIENT)
Dept: RADIOLOGY | Facility: MEDICAL CENTER | Age: 73
End: 2023-10-17
Attending: FAMILY MEDICINE
Payer: MEDICARE

## 2023-10-17 DIAGNOSIS — Z12.31 VISIT FOR SCREENING MAMMOGRAM: ICD-10-CM

## 2023-10-17 PROCEDURE — 77063 BREAST TOMOSYNTHESIS BI: CPT

## 2023-10-19 ENCOUNTER — PATIENT OUTREACH (OUTPATIENT)
Dept: HEALTH INFORMATION MANAGEMENT | Facility: OTHER | Age: 73
End: 2023-10-19
Payer: MEDICARE

## 2023-10-19 DIAGNOSIS — N18.31 CHRONIC KIDNEY DISEASE, STAGE 3A: ICD-10-CM

## 2023-10-19 DIAGNOSIS — Z86.711 HISTORY OF PULMONARY EMBOLUS (PE): ICD-10-CM

## 2023-10-19 DIAGNOSIS — Z79.01 LONG TERM (CURRENT) USE OF ANTICOAGULANTS: ICD-10-CM

## 2023-10-19 DIAGNOSIS — E78.5 DYSLIPIDEMIA: ICD-10-CM

## 2023-10-19 DIAGNOSIS — D68.51 FACTOR V LEIDEN MUTATION (HCC): ICD-10-CM

## 2023-10-19 PROCEDURE — 99490 CHRNC CARE MGMT STAFF 1ST 20: CPT | Performed by: FAMILY MEDICINE

## 2023-10-19 NOTE — PROGRESS NOTES
This RN spoke with patient for monthly outreach. She stated that she had a endoscopy done on 10/3 with biopsy. Results are scanned into chart. She saw ERUM for edema and pain in the left ankle, ERUM recommended an ankle brace and ordered a MRI for further evaluation.She does elevate and put ice on it occasionally and she only takes tylenol as needed. She has chronic nerve and back pain and has some depression. PCP prescribed duloxetine 30 mg, one PO daily  in February 2022 and she did not take it. She is inquiring about trying it now. This RN instructed  her that she needs an appointment since it has been over a year. PCP had an opening on Friday 10/20/23 and appointment  scheduled at 0630 to follow up. Patient reports no new medications and taking as prescribed and reports no new allergies. Will continue POC.    Education    Medications, allergies, appointments    Plan of Care and Goals    Continue current POC, MRI for ankle and manage pain    Barriers:    Lack of resources    Progress:    Progressing    Next outreach: Week of 11/13/23

## 2023-10-20 ENCOUNTER — OFFICE VISIT (OUTPATIENT)
Dept: MEDICAL GROUP | Facility: PHYSICIAN GROUP | Age: 73
End: 2023-10-20
Payer: MEDICARE

## 2023-10-20 VITALS
SYSTOLIC BLOOD PRESSURE: 118 MMHG | TEMPERATURE: 97.8 F | HEART RATE: 90 BPM | OXYGEN SATURATION: 95 % | HEIGHT: 63 IN | BODY MASS INDEX: 29.41 KG/M2 | RESPIRATION RATE: 16 BRPM | WEIGHT: 166 LBS | DIASTOLIC BLOOD PRESSURE: 80 MMHG

## 2023-10-20 DIAGNOSIS — R20.8 BURNING SENSATION: ICD-10-CM

## 2023-10-20 DIAGNOSIS — F32.9 REACTIVE DEPRESSION: ICD-10-CM

## 2023-10-20 DIAGNOSIS — M85.80 OSTEOPENIA, UNSPECIFIED LOCATION: ICD-10-CM

## 2023-10-20 PROCEDURE — 99214 OFFICE O/P EST MOD 30 MIN: CPT | Performed by: FAMILY MEDICINE

## 2023-10-20 PROCEDURE — 3074F SYST BP LT 130 MM HG: CPT | Performed by: FAMILY MEDICINE

## 2023-10-20 PROCEDURE — 3079F DIAST BP 80-89 MM HG: CPT | Performed by: FAMILY MEDICINE

## 2023-10-20 RX ORDER — DULOXETIN HYDROCHLORIDE 30 MG/1
30 CAPSULE, DELAYED RELEASE ORAL DAILY
Qty: 30 CAPSULE | Refills: 5 | Status: SHIPPED | OUTPATIENT
Start: 2023-10-20

## 2023-10-20 ASSESSMENT — PATIENT HEALTH QUESTIONNAIRE - PHQ9
5. POOR APPETITE OR OVEREATING: 0 - NOT AT ALL
SUM OF ALL RESPONSES TO PHQ QUESTIONS 1-9: 4
CLINICAL INTERPRETATION OF PHQ2 SCORE: 1

## 2023-10-20 ASSESSMENT — FIBROSIS 4 INDEX: FIB4 SCORE: 1.49

## 2023-10-20 NOTE — PROGRESS NOTES
Subjective:   Hermelinda Mosley is a 73 y.o. female here today for evaluation and management of:     Burning sensation  Has pain, burning sensation of scalp, she wonders if it has a stress/depression component due to her son.   rx for duloxetine provided.   Follow up in Jan as scheduled.   Gabapentin did not help.     Reactive depression  Due to her son Kathyians severe Dementia FTD, he is bed bound and does not talk does not move one arm.              Current medicines (including changes today)  Current Outpatient Medications   Medication Sig Dispense Refill    DULoxetine (CYMBALTA) 30 MG Cap DR Particles Take 1 Capsule by mouth every day. 30 Capsule 5    magnesium oxide (MAG-OX) 400 MG Tab tablet Take 400 mg by mouth 1 time a day as needed (for leg cramps). Leg Cramps      lidocaine (LIDODERM) 5 % Patch Place 1 Patch on the skin every 24 hours. 10 Patch 0    diclofenac sodium (VOLTAREN) 1 % Gel Apply 2 g topically 1 time a day as needed (pain). 50 g 0    methocarbamol (ROBAXIN) 500 MG Tab Take 1 Tablet by mouth 3 times a day. 90 Tablet 3    CALCIUM PO Take 1 Tablet by mouth every day.      omeprazole (PRILOSEC) 20 MG delayed-release capsule Take 1 Capsule by mouth every day. 90 Capsule 3    rivaroxaban (XARELTO) 10 MG Tab tablet Take 1 Tablet by mouth with dinner. 90 Tablet 3    Multiple Vitamins-Minerals (MULTIVITAMIN PO) Take 1 Tablet by mouth every day.      Cholecalciferol (VITAMIN D PO) Take  by mouth.       No current facility-administered medications for this visit.     She  has a past medical history of Abdominal pain, unspecified site, Alpha 1-antitrypsin PiMS phenotype, Back pain, Bronchitis, Chickenpox, Degeneration of lumbar or lumbosacral intervertebral disc, Diarrhea, DVT (deep venous thrombosis) (HCC), Esophageal reflux, Telugu measles, Hernia of unspecified site of abdominal cavity without mention of obstruction or gangrene (repaired), History of colonoscopy (2005=normal), Influenza, Irritable bowel  "syndrome, Mumps, Pap smear (due), Pneumonia, Recurrent UTI (9/4/2009), Restless leg syndrome, Screening mammogram (10/30/2008=normal), Superficial thrombophlebitis of left leg (10/20/2017), Swelling (9/4/2009), Tonsillitis, Unspecified hemorrhoids without mention of complication, and Vein, varicose (stripped).    She has no past medical history of Encounter for long-term (current) use of other medications.    ROS  No chest pain, no shortness of breath, no abdominal pain       Objective:     /80   Pulse 90   Temp 36.6 °C (97.8 °F) (Temporal)   Resp 16   Ht 1.6 m (5' 3\")   Wt 75.3 kg (166 lb)   SpO2 95%  Body mass index is 29.41 kg/m².   Physical Exam:  Constitutional: Alert, no distress, well-groomed.  Skin: No rashes in visible areas.  Eye: Round. Conjunctiva clear, lids normal. No icterus.   ENMT: Lips pink without lesions, good dentition, moist mucous membranes. Phonation normal.  Neck: No masses, no thyromegaly. Moves freely without pain.  Respiratory: Unlabored respiratory effort, no cough or audible wheeze  Psych: Alert and oriented x3, normal affect and mood.          Assessment and Plan:   The following treatment plan was discussed    1. Burning sensation    2. Reactive depression    Other orders  - DULoxetine (CYMBALTA) 30 MG Cap DR Particles; Take 1 Capsule by mouth every day.  Dispense: 30 Capsule; Refill: 5      Followup: No follow-ups on file.           "

## 2023-10-20 NOTE — ASSESSMENT & PLAN NOTE
Has pain, burning sensation of scalp, she wonders if it has a stress/depression component due to her son.   rx for duloxetine provided.   Follow up in Jan as scheduled.   Gabapentin did not help.

## 2023-10-20 NOTE — ASSESSMENT & PLAN NOTE
Due to her son Dewey severe Dementia FTD, he is bed bound and does not talk does not move one arm.

## 2023-11-17 ENCOUNTER — PATIENT OUTREACH (OUTPATIENT)
Dept: HEALTH INFORMATION MANAGEMENT | Facility: OTHER | Age: 73
End: 2023-11-17
Payer: MEDICARE

## 2023-11-17 DIAGNOSIS — N18.31 CHRONIC KIDNEY DISEASE, STAGE 3A: ICD-10-CM

## 2023-11-17 DIAGNOSIS — E78.5 DYSLIPIDEMIA: ICD-10-CM

## 2023-11-17 NOTE — PROGRESS NOTES
23 @ 1014 This RN spoke with patient over the phone for [facility] monthly out reach call.  She stated that she was unable to talk because her son with dementia had just passed away and she was at the  home taking care of the arrangements.  This RN offered condolences and requested a call back the week after Thanksgiving.  Patient agrees with plan.     2428 This RN left message on patient's voice mail requesting a return call for monthly [facility] out reach.     23 - [phone] -  This RN spoke with patient over the phone regarding her monthly out reach phone call.  Patient states that they had their son's  this last Saturday and she is coping well with the loss.  She reports that she is still taking the duloxetine and states  it has been effective in managing her symptoms of depression, anxiety and chronic pain.  She states that she is unsure if she wants to take the medication long-term but this RN instructed her to speak with her physician at the next appointment.  states that every morning she wakes up and feels like there is something caught in her throat she uses mouthwash to help but this is something new and this RN instructed her to speak with PCP at next visit as well.  She saw ERUM in early November and they recommended a boot for her ankle but she states that her ankle is doing well and has not been wearing it. she reports that it is weaker and that sometimes her ankle will give out on her this RN instructed her to  use an Ace bandage for extra support, and reports that she has not had any recent falls. She verbalizes understanding.  she is taking all medications as prescribed with no changes.  She reports that she has no other needs and this RN instructed her to call [facility] phone number if she has any further questions or concerns.    Assessment    See above    Education    provided active listening and emotional support  prevention of falls, and medication    Plan of Care and Goals    continue  current plan of care    Barriers:    loss of family member    Progress:     progressing    Next outreach:  the week of 12/18/2023

## 2023-12-21 ENCOUNTER — PATIENT OUTREACH (OUTPATIENT)
Dept: HEALTH INFORMATION MANAGEMENT | Facility: OTHER | Age: 73
End: 2023-12-21
Payer: MEDICARE

## 2023-12-21 DIAGNOSIS — E78.5 DYSLIPIDEMIA: ICD-10-CM

## 2023-12-21 DIAGNOSIS — Z86.711 HISTORY OF PULMONARY EMBOLUS (PE): ICD-10-CM

## 2023-12-21 DIAGNOSIS — N18.31 CHRONIC KIDNEY DISEASE, STAGE 3A: ICD-10-CM

## 2023-12-21 NOTE — PROGRESS NOTES
This RN spoke with patient for monthly/quarterly PCM outreach. Her son  of Alzheimers around  of this year and then another family  a couple of days afterward from brain cancer. The funerals were 2 weeks apart. Hermelinda reports that she is coping well and is taking duloxetine and it has been effective. She reports that her ankle is getting better every day and healing well. She has noticed a bump on the top of her foot and it is painful when she is walking a lot and wearing shoes. Sometimes the bump disappears. She denies swelling and redness. She states that she is not sure if it is due to the cold weather and an arthritis flare. This RN instructed her to watch it for now and to wear padding over the top of it when wearing shoes. She verbalizes understanding. She has an appointment with PCP in early January and will discuss then if it is still bothering her. Hermelinda reports no other needs at this time. This RN instructed her to call PCM phone number if she has further questions or concerns. Will continue current POC.    Education: depression, medications and foot pain    Goals: Continue antidepressant    Barriers: knowledge    POC:Continue current plan of care    Progression:Progressing    Next Outreach: Week of 24

## 2024-01-08 ENCOUNTER — HOSPITAL ENCOUNTER (OUTPATIENT)
Dept: LAB | Facility: MEDICAL CENTER | Age: 74
End: 2024-01-08
Attending: FAMILY MEDICINE
Payer: MEDICARE

## 2024-01-08 DIAGNOSIS — R20.8 BURNING SENSATION: ICD-10-CM

## 2024-01-08 DIAGNOSIS — R79.89 ABNORMAL CBC: ICD-10-CM

## 2024-01-08 DIAGNOSIS — E78.5 DYSLIPIDEMIA: ICD-10-CM

## 2024-01-08 LAB
BASOPHILS # BLD AUTO: 0.7 % (ref 0–1.8)
BASOPHILS # BLD: 0.03 K/UL (ref 0–0.12)
EOSINOPHIL # BLD AUTO: 0.13 K/UL (ref 0–0.51)
EOSINOPHIL NFR BLD: 3.2 % (ref 0–6.9)
ERYTHROCYTE [DISTWIDTH] IN BLOOD BY AUTOMATED COUNT: 39.3 FL (ref 35.9–50)
HCT VFR BLD AUTO: 49.7 % (ref 37–47)
HGB BLD-MCNC: 16.4 G/DL (ref 12–16)
IMM GRANULOCYTES # BLD AUTO: 0.01 K/UL (ref 0–0.11)
IMM GRANULOCYTES NFR BLD AUTO: 0.2 % (ref 0–0.9)
LYMPHOCYTES # BLD AUTO: 1.29 K/UL (ref 1–4.8)
LYMPHOCYTES NFR BLD: 31.3 % (ref 22–41)
MCH RBC QN AUTO: 28.4 PG (ref 27–33)
MCHC RBC AUTO-ENTMCNC: 33 G/DL (ref 32.2–35.5)
MCV RBC AUTO: 86.1 FL (ref 81.4–97.8)
MONOCYTES # BLD AUTO: 0.37 K/UL (ref 0–0.85)
MONOCYTES NFR BLD AUTO: 9 % (ref 0–13.4)
NEUTROPHILS # BLD AUTO: 2.29 K/UL (ref 1.82–7.42)
NEUTROPHILS NFR BLD: 55.6 % (ref 44–72)
NRBC # BLD AUTO: 0 K/UL
NRBC BLD-RTO: 0 /100 WBC (ref 0–0.2)
PLATELET # BLD AUTO: 294 K/UL (ref 164–446)
PMV BLD AUTO: 10.4 FL (ref 9–12.9)
RBC # BLD AUTO: 5.77 M/UL (ref 4.2–5.4)
WBC # BLD AUTO: 4.1 K/UL (ref 4.8–10.8)

## 2024-01-08 PROCEDURE — 80053 COMPREHEN METABOLIC PANEL: CPT

## 2024-01-08 PROCEDURE — 80061 LIPID PANEL: CPT

## 2024-01-08 PROCEDURE — 82607 VITAMIN B-12: CPT

## 2024-01-08 PROCEDURE — 85025 COMPLETE CBC W/AUTO DIFF WBC: CPT

## 2024-01-08 PROCEDURE — 82746 ASSAY OF FOLIC ACID SERUM: CPT

## 2024-01-08 PROCEDURE — 86038 ANTINUCLEAR ANTIBODIES: CPT

## 2024-01-08 PROCEDURE — 36415 COLL VENOUS BLD VENIPUNCTURE: CPT

## 2024-01-09 ENCOUNTER — OFFICE VISIT (OUTPATIENT)
Dept: MEDICAL GROUP | Facility: PHYSICIAN GROUP | Age: 74
End: 2024-01-09
Payer: MEDICARE

## 2024-01-09 VITALS
RESPIRATION RATE: 14 BRPM | TEMPERATURE: 97.9 F | OXYGEN SATURATION: 98 % | HEIGHT: 63 IN | BODY MASS INDEX: 32.07 KG/M2 | WEIGHT: 181 LBS | HEART RATE: 80 BPM | DIASTOLIC BLOOD PRESSURE: 78 MMHG | SYSTOLIC BLOOD PRESSURE: 118 MMHG

## 2024-01-09 DIAGNOSIS — M51.37 DEGENERATION OF LUMBAR OR LUMBOSACRAL INTERVERTEBRAL DISC: ICD-10-CM

## 2024-01-09 DIAGNOSIS — F33.1 MODERATE EPISODE OF RECURRENT MAJOR DEPRESSIVE DISORDER (HCC): ICD-10-CM

## 2024-01-09 DIAGNOSIS — M25.552 BILATERAL HIP PAIN: ICD-10-CM

## 2024-01-09 DIAGNOSIS — D58.2 ELEVATED HEMOGLOBIN (HCC): ICD-10-CM

## 2024-01-09 DIAGNOSIS — E88.01 ALPHA-1-ANTITRYPSIN DEFICIENCY (HCC): ICD-10-CM

## 2024-01-09 DIAGNOSIS — R79.89 HIGH SERUM VITAMIN B12: ICD-10-CM

## 2024-01-09 DIAGNOSIS — D68.51 FACTOR V LEIDEN MUTATION (HCC): ICD-10-CM

## 2024-01-09 DIAGNOSIS — M41.9 SCOLIOSIS OF LUMBAR SPINE, UNSPECIFIED SCOLIOSIS TYPE: ICD-10-CM

## 2024-01-09 DIAGNOSIS — M25.551 BILATERAL HIP PAIN: ICD-10-CM

## 2024-01-09 DIAGNOSIS — E78.5 DYSLIPIDEMIA: ICD-10-CM

## 2024-01-09 DIAGNOSIS — N18.31 CHRONIC KIDNEY DISEASE, STAGE 3A: ICD-10-CM

## 2024-01-09 LAB
ALBUMIN SERPL BCP-MCNC: 4.4 G/DL (ref 3.2–4.9)
ALBUMIN/GLOB SERPL: 1.8 G/DL
ALP SERPL-CCNC: 67 U/L (ref 30–99)
ALT SERPL-CCNC: 18 U/L (ref 2–50)
ANION GAP SERPL CALC-SCNC: 9 MMOL/L (ref 7–16)
AST SERPL-CCNC: 19 U/L (ref 12–45)
BILIRUB SERPL-MCNC: 0.4 MG/DL (ref 0.1–1.5)
BUN SERPL-MCNC: 10 MG/DL (ref 8–22)
CALCIUM ALBUM COR SERPL-MCNC: 9.1 MG/DL (ref 8.5–10.5)
CALCIUM SERPL-MCNC: 9.4 MG/DL (ref 8.5–10.5)
CHLORIDE SERPL-SCNC: 104 MMOL/L (ref 96–112)
CHOLEST SERPL-MCNC: 169 MG/DL (ref 100–199)
CO2 SERPL-SCNC: 26 MMOL/L (ref 20–33)
CREAT SERPL-MCNC: 1 MG/DL (ref 0.5–1.4)
FASTING STATUS PATIENT QL REPORTED: NORMAL
FOLATE SERPL-MCNC: 35.5 NG/ML
GFR SERPLBLD CREATININE-BSD FMLA CKD-EPI: 59 ML/MIN/1.73 M 2
GLOBULIN SER CALC-MCNC: 2.4 G/DL (ref 1.9–3.5)
GLUCOSE SERPL-MCNC: 89 MG/DL (ref 65–99)
HDLC SERPL-MCNC: 61 MG/DL
LDLC SERPL CALC-MCNC: 90 MG/DL
POTASSIUM SERPL-SCNC: 4.4 MMOL/L (ref 3.6–5.5)
PROT SERPL-MCNC: 6.8 G/DL (ref 6–8.2)
SODIUM SERPL-SCNC: 139 MMOL/L (ref 135–145)
TRIGL SERPL-MCNC: 88 MG/DL (ref 0–149)
VIT B12 SERPL-MCNC: 1873 PG/ML (ref 211–911)

## 2024-01-09 PROCEDURE — 3078F DIAST BP <80 MM HG: CPT | Performed by: FAMILY MEDICINE

## 2024-01-09 PROCEDURE — 99214 OFFICE O/P EST MOD 30 MIN: CPT | Performed by: FAMILY MEDICINE

## 2024-01-09 PROCEDURE — 3074F SYST BP LT 130 MM HG: CPT | Performed by: FAMILY MEDICINE

## 2024-01-09 RX ORDER — CHOLECALCIFEROL (VITAMIN D3) 125 MCG
500 CAPSULE ORAL DAILY
COMMUNITY

## 2024-01-09 ASSESSMENT — FIBROSIS 4 INDEX: FIB4 SCORE: 1.11

## 2024-01-09 ASSESSMENT — PATIENT HEALTH QUESTIONNAIRE - PHQ9: CLINICAL INTERPRETATION OF PHQ2 SCORE: 0

## 2024-01-09 NOTE — ASSESSMENT & PLAN NOTE
Mild elevated hb, lives [place] 4000 feet, does not smoke.   Has alpha 1 antitripsin def and had pulm testing by pulmonology, does not have results yet.

## 2024-01-09 NOTE — PROGRESS NOTES
Subjective:   Hermelinda Mosley is a 73 y.o. female here today for evaluation and management of:     Elevated hemoglobin (HCC)  Mild elevated hb, lives at elevation 4000 feet, does not smoke.   Has alpha 1 antitripsin def and had pulm testing by pulmonology, does not have results yet.     Dyslipidemia  The 10-year ASCVD risk score (Luis DK, et al., 2019) is: 10.8%  Will provide rx for lipitor 10 mg can take it once a week, then every other day as she had low back pain due to crestor.   Repeat cmp, lipids ordered.     Chronic kidney disease, stage 3a (HCC)  Gfr  stable at 59  Encouraged to hydrate with 64 oz daily  Repeat labs in 6 months.     Degeneration of Lumbar or Lumbosacral Intervertebral Disc  She has low back pain, MRI shows stenosis of lumbar spine, has pain of b/l buttocks when trying to sleep.   Ref to chiropractic provided as she wants to try Accupressure and chiropractic.      HCC Gap Form    Diagnosis to address: N18.31 - Chronic kidney disease, stage 3a (HCC)  Assessment and plan: Chronic, stable. Continue with current defined treatment plan: gfr stable in 59, stay well hydrated repeat labs in 6 months avoid NSAIDS Follow-up at least annually.  Diagnosis: E88.01 - Alpha-1-antitrypsin deficiency (HCC)  Assessment and plan: Chronic, stable. Continue with current defined treatment plan: no breathing problems or extra pulmonary issues. Follow-up at least annually.  Diagnosis: D68.51 - Factor V Leiden mutation (HCC)  Assessment and plan: Chronic, stable. Continue with current defined treatment plan: continues on xarelto. Follow-up at least annually.  Diagnosis: F33.9 - Major depressive disorder, recurrent, unspecified (HCC)  Assessment and plan: Chronic, stable. Continue with current defined treatment plan: continue on cymbalta.  Follow-up at least annually.  Last edited 01/09/24 10:26 PST by Maribel Long M.D.             Current medicines (including changes today)  Current Outpatient Medications    Medication Sig Dispense Refill    cyanocobalamin (VITAMIN B-12) 500 MCG Tab Take 500 mcg by mouth every day.      DULoxetine (CYMBALTA) 30 MG Cap DR Particles Take 1 Capsule by mouth every day. 30 Capsule 5    magnesium oxide (MAG-OX) 400 MG Tab tablet Take 250 mg by mouth 1 time a day as needed (for leg cramps). Leg Cramps      lidocaine (LIDODERM) 5 % Patch Place 1 Patch on the skin every 24 hours. 10 Patch 0    diclofenac sodium (VOLTAREN) 1 % Gel Apply 2 g topically 1 time a day as needed (pain). 50 g 0    methocarbamol (ROBAXIN) 500 MG Tab Take 1 Tablet by mouth 3 times a day. 90 Tablet 3    CALCIUM PO Take 1 Tablet by mouth every day.      omeprazole (PRILOSEC) 20 MG delayed-release capsule Take 1 Capsule by mouth every day. 90 Capsule 3    rivaroxaban (XARELTO) 10 MG Tab tablet Take 1 Tablet by mouth with dinner. 90 Tablet 3    Multiple Vitamins-Minerals (MULTIVITAMIN PO) Take 1 Tablet by mouth every day.      Cholecalciferol (VITAMIN D PO) Take  by mouth.       No current facility-administered medications for this visit.     She  has a past medical history of Abdominal pain, unspecified site, Alpha 1-antitrypsin PiMS phenotype, Back pain, Bronchitis, Chickenpox, Degeneration of lumbar or lumbosacral intervertebral disc, Diarrhea, DVT (deep venous thrombosis) (HCC), Esophageal reflux, Telugu measles, Hernia of unspecified site of abdominal cavity without mention of obstruction or gangrene (repaired), History of colonoscopy (2005=normal), Influenza, Irritable bowel syndrome, Mumps, Pap smear (due), Pneumonia, Recurrent UTI (9/4/2009), Restless leg syndrome, Screening mammogram (10/30/2008=normal), Superficial thrombophlebitis of left leg (10/20/2017), Swelling (9/4/2009), Tonsillitis, Unspecified hemorrhoids without mention of complication, and Vein, varicose (stripped).    She has no past medical history of Encounter for long-term (current) use of other medications.    ROS  No chest pain, no shortness of  "breath, no abdominal pain       Objective:     /78   Pulse 80   Temp 36.6 °C (97.9 °F) (Temporal)   Resp 14   Ht 1.6 m (5' 3\")   Wt 82.1 kg (181 lb)   SpO2 98%  Body mass index is 32.06 kg/m².   Physical Exam:  Constitutional: Alert, no distress.  Skin: Warm, dry, good turgor, no rashes in visible areas.  Eye: Equal, round and reactive, conjunctiva clear, lids normal.  ENMT: Lips without lesions, good dentition, oropharynx clear.  Neck: Trachea midline, no masses, no thyromegaly. No cervical or supraclavicular lymphadenopathy  Respiratory: Unlabored respiratory effort, lungs clear to auscultation, no wheezes, no ronchi.  Cardiovascular: Normal S1, S2, no murmur, no edema.  Abdomen: Soft, non-tender, no masses, no hepatosplenomegaly.  Psych: Alert and oriented x3, normal affect and mood.        Assessment and Plan:   The following treatment plan was discussed    1. Elevated hemoglobin (HCC)  - CBC WITH DIFFERENTIAL; Future    2. Dyslipidemia    3. Bilateral hip pain  - Referral to Chiropractic    4. Degeneration of lumbar or lumbosacral intervertebral disc  - Referral to Chiropractic    5. Scoliosis of lumbar spine, unspecified scoliosis type  - Referral to Chiropractic    6. High serum vitamin B12  - VITAMIN B12; Future    7. Chronic kidney disease, stage 3a (HCC)  - Renal Function Panel; Future    Other orders  - cyanocobalamin (VITAMIN B-12) 500 MCG Tab; Take 500 mcg by mouth every day.  - Obtain Results: Other (see comment) (pulmonary consult, PFT); Obtain Results From: [facility] ([facility], [facility]. [facility]ano)      Followup: No follow-ups on file.           "

## 2024-01-09 NOTE — ASSESSMENT & PLAN NOTE
She has low back pain, MRI shows stenosis of lumbar spine, has pain of b/l buttocks when trying to sleep.   Ref to chiropractic provided as she wants to try Accupressure and chiropractic.

## 2024-01-09 NOTE — ASSESSMENT & PLAN NOTE
The 10-year ASCVD risk score (Luis HAM, et al., 2019) is: 10.8%  Will provide rx for lipitor 10 mg can take it once a week, then every other day as she had low back pain due to crestor.   Repeat cmp, lipids ordered.

## 2024-01-09 NOTE — LETTER
[facility]  Maribel Long M.D.  [address]  [place] [address]  Fax: [phone]   Authorization for Release/Disclosure of   Protected Health Information   Name: MAK FOX : 1950 SSN: [id number]   Address: [address]  [place] [address] Phone:    [phone] (home)    I authorize the entity listed below to release/disclose the PHI below to:   See Sullivan/Maribel Long M.D. and Maribel Long M.D.   [facility]   Address [address] [address]   Phone:  [phone]    Fax:  [phone]   Reason for request: continuity of care   Information to be released:    [  ] LAST COLONOSCOPY,  including any PATH REPORT and follow-up  [  ] LAST FIT/COLOGUARD RESULT [  ] LAST DEXA  [  ] LAST MAMMOGRAM  [  ] LAST PAP  [  ] LAST LABS [  ] RETINA EXAM REPORT  [  ] IMMUNIZATION RECORDS  [ X] Release all info      [  ] Check here and initial the line next to each item to release ALL health information INCLUDING  _____ Care and treatment for drug and / or alcohol abuse  _____ HIV testing, infection status, or AIDS  _____ Genetic Testing    DATES OF SERVICE OR TIME PERIOD TO BE DISCLOSED: _____________  I understand and acknowledge that:  * This Authorization may be revoked at any time by you in writing, except if your health information has already been used or disclosed.  * Your health information that will be used or disclosed as a result of you signing this authorization could be re-disclosed by the recipient. If this occurs, your re-disclosed health information may no longer be protected by State or Federal laws.  * You may refuse to sign this Authorization. Your refusal will not affect your ability to obtain treatment.  * This Authorization becomes effective upon signing and will  on (date) __________.      If no date is indicated, this Authorization will  one (1) year from the signature date.     Name: Hermelinda Gomez Dimitri    Signature:   Date:     1/9/2024       PLEASE FAX REQUESTED RECORDS BACK TO: [phone]

## 2024-01-10 LAB — NUCLEAR IGG SER QL IA: NORMAL

## 2024-01-25 ENCOUNTER — PATIENT OUTREACH (OUTPATIENT)
Dept: HEALTH INFORMATION MANAGEMENT | Facility: OTHER | Age: 74
End: 2024-01-25
Payer: MEDICARE

## 2024-01-25 DIAGNOSIS — E78.5 DYSLIPIDEMIA: ICD-10-CM

## 2024-01-25 DIAGNOSIS — N18.31 CHRONIC KIDNEY DISEASE, STAGE 3A: ICD-10-CM

## 2024-01-25 NOTE — PROGRESS NOTES
"This RN spoke with patient for monthly PCM outreach. Patient reports that she is having mid back pain. She has been using heat, lidocaine patches and Voltaren cream, and aroma therapy and states that it is \"mostly\" effective. PCP placed referral to Chiropractor and this RN encouraged her to make an appointment.This RN provided options for therapeutic massage with a recommendation. Patient is unsure about re-starting the rosuvastatin as cholesterol has improved. Her diet has improved due to 's heart disease and has increased exercise. Records were received and scanned into patient medical record from pulmonology consultation. Patient stated that she has an appointment scheduled for February as well.   Patient states that all medications are taken as prescribed with no medication changes. Patient reports no recent falls, or recent hospitalizations/ER admissions. This RN instructed patient to call PCM phone number at [phone] if any further questions, concerns or needs. Patient verbalizes understanding.         Education: Back pain, medications, referrals    Goals: Back pain relief. Make appointment with chiropractor    Barriers: knowledge, disease process    Interventions: RN will continue to call patient for monthly outreach. RN will provide resources per patient request.    POC:Continue current plan of care    Progression: Progressing    Next Outreach: Week of  3/18/24  "

## 2024-02-23 ENCOUNTER — PATIENT OUTREACH (OUTPATIENT)
Dept: HEALTH INFORMATION MANAGEMENT | Facility: OTHER | Age: 74
End: 2024-02-23
Payer: MEDICARE

## 2024-02-23 DIAGNOSIS — N18.31 CHRONIC KIDNEY DISEASE, STAGE 3A: ICD-10-CM

## 2024-02-23 DIAGNOSIS — E78.5 DYSLIPIDEMIA: ICD-10-CM

## 2024-02-23 NOTE — PROGRESS NOTES
02/23/22This RN left message on patient's voicemail with contact information requesting a return call for PCM monthly outreach.   02/26/22 This RN left message on patient's voicemail with contact information requesting a return call for PCM monthly outreach.   02/27/24 This RN left message on patient's voicemail with contact information requesting a return call for PCM monthly outreach.     This RN spoke with patient for monthly PCM outreach. Patient reports that her  received a cornea transplant in [place] and has been very busy. She states that she is having a lot of aches and pains and wondering if it could be arthritis. She has not scheduled an appointment with Chiropractor yet due to her busy schedule. She reports that duloxetine has not been effective relating to pain and unsure if it is effective in managing depression. This RN instructed her to schedule appointment with PCP to discuss further and to continue taking it as directed due to potential side effects of not tapering the dose. Patient verbalizes understanding. She states that she is eating a heart healthy diet, as she prefers not to take rosuvastatin.  Patient states that all medications are taken as prescribed with no medication changes and no new allergies. Appointments and lab reviewed with patient.Patient reports no recent falls, recent hospital admissions or ER visits. This RN instructed patient to call PCM phone number at [phone] if any further questions, concerns or needs. Patient verbalizes understanding.  Will continue current plan of care.        Education: Educated patient to follow up for duloxetine    Goals: Continue to take duloxetine as prescribed. Continue heart healthy diet as to avoid cholesterol medications.     Barriers: Advanced age, knowledge deficit, or disease process.    Interventions: RN will continue to call patient for monthly outreach. RN will provide resources per patient request.    POC:Continue current  plan of care    Progression: Progressing    Next Outreach:Week of 3/19

## 2024-03-15 NOTE — ASSESSMENT & PLAN NOTE
"  Name: Andre Agosto \"Bernardo\"    Relationship: Self    Best Callback Number: [phone]    HUB PROVIDED THE RELAY MESSAGE FROM THE OFFICE   PATIENT SCHEDULED AS REQUESTED    ADDITIONAL INFORMATION: 03/20/24 @ 1:30 PM    " Stable, intermittent flares  Some incontinence due to urgency  Worse with coffee

## 2024-03-21 ENCOUNTER — PATIENT OUTREACH (OUTPATIENT)
Dept: HEALTH INFORMATION MANAGEMENT | Facility: OTHER | Age: 74
End: 2024-03-21
Payer: MEDICARE

## 2024-03-21 DIAGNOSIS — E78.5 DYSLIPIDEMIA: ICD-10-CM

## 2024-03-21 DIAGNOSIS — N18.31 CHRONIC KIDNEY DISEASE, STAGE 3A: ICD-10-CM

## 2024-03-21 NOTE — PROGRESS NOTES
"This RN spoke with patient for monthly/quarterly PCM outreach. Patient reports that she has not scheduled appointment with chiropractor yet. She states that she has been very busy with husbands appointments (corneal transplant and knee surgery). She states that she is still having \"aches and pains\" more often and states that the duloxetine is not effective. She reports that she has noticed changes in her bowels with more diarrhea and unable to evacuate all the way. She takes metamucil a few times per week and so she is requesting to see GI for a colonoscopy. Her last colonoscopy was in 2016. This RN scheduled appointment with PCP to address her concerns. Appointment is on 3/29/24 at 10 a.m. Patient states that all medications are taken as prescribed with no medication changes and no new allergies. Appointments and lab reviewed with patient.Patient reports no recent falls, recent hospital admissions or ER visits. This RN instructed patient to call PCM phone number at [phone] if any further questions, concerns or needs. Patient verbalizes understanding.  Will continue current plan of care.        Education: medications, appointments. Discussion of bowel changes.     Goals: Follow up with provider regarding changes in bowels and medication    Barriers: Advanced age, knowledge deficit, or disease process.    Interventions: RN will continue to call patient for monthly outreach. RN will provide resources per patient request.    POC:Continue current plan of care    Progression: Progressing    Next Outreach: Week of 4/15/24  "

## 2024-03-29 ENCOUNTER — OFFICE VISIT (OUTPATIENT)
Dept: MEDICAL GROUP | Facility: PHYSICIAN GROUP | Age: 74
End: 2024-03-29
Payer: MEDICARE

## 2024-03-29 VITALS
BODY MASS INDEX: 29.06 KG/M2 | SYSTOLIC BLOOD PRESSURE: 120 MMHG | HEIGHT: 63 IN | TEMPERATURE: 98.1 F | OXYGEN SATURATION: 95 % | RESPIRATION RATE: 18 BRPM | DIASTOLIC BLOOD PRESSURE: 72 MMHG | HEART RATE: 84 BPM | WEIGHT: 164 LBS

## 2024-03-29 DIAGNOSIS — K21.00 GASTROESOPHAGEAL REFLUX DISEASE WITH ESOPHAGITIS WITHOUT HEMORRHAGE: ICD-10-CM

## 2024-03-29 DIAGNOSIS — Z79.01 LONG TERM (CURRENT) USE OF ANTICOAGULANTS: ICD-10-CM

## 2024-03-29 DIAGNOSIS — K58.0 IRRITABLE BOWEL SYNDROME WITH DIARRHEA: ICD-10-CM

## 2024-03-29 PROCEDURE — 99214 OFFICE O/P EST MOD 30 MIN: CPT | Performed by: FAMILY MEDICINE

## 2024-03-29 PROCEDURE — 3078F DIAST BP <80 MM HG: CPT | Performed by: FAMILY MEDICINE

## 2024-03-29 PROCEDURE — 3074F SYST BP LT 130 MM HG: CPT | Performed by: FAMILY MEDICINE

## 2024-03-29 RX ORDER — OMEPRAZOLE 20 MG/1
20 CAPSULE, DELAYED RELEASE ORAL
Qty: 30 CAPSULE | Refills: 3 | Status: SHIPPED | OUTPATIENT
Start: 2024-03-29

## 2024-03-29 ASSESSMENT — FIBROSIS 4 INDEX: FIB4 SCORE: 1.11

## 2024-03-29 NOTE — ASSESSMENT & PLAN NOTE
Chronic condition, has some increased bowel movement frequency, sometimes watery, sometimes soft. Sensation of incomplete emptying, sometimes sensation of constipation.   Some incontinence due to urgency at times.   Worse with coffee.   I reviewed last GI consult notes and they recommended trial off ppi and to take is as needed for GERD.   Metamucil has helped some.   Can start probiotics  She can make follow up with GI.   She had normal colonoscopy in 2016 with 10 year recall.   Symptoms since July 23, had some worse symptoms in last 2-3 months and last week it was better.

## 2024-04-02 NOTE — PROGRESS NOTES
Subjective:   Hermelinda Mosley is a 73 y.o. female here today for evaluation and management of:     Irritable Bowel Syndrome  Chronic condition, has some increased bowel movement frequency, sometimes watery, sometimes soft. Sensation of incomplete emptying, sometimes sensation of constipation.   Some incontinence due to urgency at times.   Worse with coffee.   I reviewed last GI consult notes and they recommended trial off ppi and to take is as needed for GERD.   Metamucil has helped some.   Can start probiotics  She can make follow up with GI.   She had normal colonoscopy in 2016 with 10 year recall.   Symptoms since July 23, had some worse symptoms in last 2-3 months and last week it was better.            Current medicines (including changes today)  Current Outpatient Medications   Medication Sig Dispense Refill    rivaroxaban (XARELTO) 10 MG Tab tablet Take 1 Tablet by mouth with dinner. 90 Tablet 3    omeprazole (PRILOSEC) 20 MG delayed-release capsule Take 1 Capsule by mouth 1 time a day as needed (acid reflux). 30 Capsule 3    cyanocobalamin (VITAMIN B-12) 500 MCG Tab Take 500 mcg by mouth every day.      DULoxetine (CYMBALTA) 30 MG Cap DR Particles Take 1 Capsule by mouth every day. 30 Capsule 5    magnesium oxide (MAG-OX) 400 MG Tab tablet Take 250 mg by mouth 1 time a day as needed (for leg cramps). Leg Cramps      lidocaine (LIDODERM) 5 % Patch Place 1 Patch on the skin every 24 hours. 10 Patch 0    diclofenac sodium (VOLTAREN) 1 % Gel Apply 2 g topically 1 time a day as needed (pain). 50 g 0    methocarbamol (ROBAXIN) 500 MG Tab Take 1 Tablet by mouth 3 times a day. 90 Tablet 3    CALCIUM PO Take 1 Tablet by mouth every day.      Multiple Vitamins-Minerals (MULTIVITAMIN PO) Take 1 Tablet by mouth every day.      Cholecalciferol (VITAMIN D PO) Take  by mouth.       No current facility-administered medications for this visit.     She  has a past medical history of Abdominal pain, unspecified site, Alpha  "1-antitrypsin PiMS phenotype, Back pain, Bronchitis, Chickenpox, Degeneration of lumbar or lumbosacral intervertebral disc, Diarrhea, DVT (deep venous thrombosis) (HCC), Esophageal reflux, [place]iqu[place] measles, Hernia of unspecified site of abdominal cavity without mention of obstruction or gangrene (repaired), History of colonoscopy (2005=normal), Influenza, Irritable bowel syndrome, Mumps, Pap smear (due), Pneumonia, Recurrent UTI (9/4/2009), Restless leg syndrome, Screening mammogram (10/30/2008=normal), Superficial thrombophlebitis of left leg (10/20/2017), Swelling (9/4/2009), Tonsillitis, Unspecified hemorrhoids without mention of complication, and Vein, varicose (stripped).    She has no past medical history of Encounter for long-term (current) use of other medications.    ROS  No chest pain, no shortness of breath, no abdominal pain       Objective:     /72 (BP Location: Right arm, Patient Position: Sitting, BP Cuff Size: Adult long)   Pulse 84   Temp 36.7 °C (98.1 °F) (Temporal)   Resp 18   Ht 1.6 m (5' 3\")   Wt 74.4 kg (164 lb)   SpO2 95%  Body mass index is 29.05 kg/m².   Physical Exam:  Constitutional: Alert, no distress.  Skin: Warm, dry, good turgor, no rashes in visible areas.  Eye: Equal, round and reactive, conjunctiva clear, lids normal.  ENMT: Lips without lesions, good dentition, oropharynx clear.  Neck: Trachea midline, no masses, no thyromegaly. No cervical or supraclavicular lymphadenopathy  Respiratory: Unlabored respiratory effort, lungs clear to auscultation, no wheezes, no ronchi.  Cardiovascular: Normal S1, S2, no murmur, no edema.  Abdomen: Soft, non-tender, no masses, no hepatosplenomegaly.  Psych: Alert and oriented x3, normal affect and mood.    Assessment and Plan:   The following treatment plan was discussed    1. Irritable bowel syndrome with diarrhea    2. Long term (current) use of anticoagulants  - rivaroxaban (XARELTO) 10 MG Tab tablet; Take 1 Tablet by mouth with dinner. "  Dispense: 90 Tablet; Refill: 3    3. Gastroesophageal reflux disease with esophagitis without hemorrhage  - omeprazole (PRILOSEC) 20 MG delayed-release capsule; Take 1 Capsule by mouth 1 time a day as needed (acid reflux).  Dispense: 30 Capsule; Refill: 3      Followup: Return for As Scheduled in July.

## 2024-04-12 ENCOUNTER — PATIENT OUTREACH (OUTPATIENT)
Dept: HEALTH INFORMATION MANAGEMENT | Facility: OTHER | Age: 74
End: 2024-04-12
Payer: MEDICARE

## 2024-04-12 DIAGNOSIS — N18.31 CHRONIC KIDNEY DISEASE, STAGE 3A: ICD-10-CM

## 2024-04-12 NOTE — PROGRESS NOTES
4/12/24 This RN left message on patient's voicemail with contact information requesting a return call for PCM monthly outreach.   4/15/24 This RN left message on patient's voicemail with contact information requesting a return call for PCM monthly outreach.  4/16/24 This RN left message on patient's voicemail with contact information requesting a return call for PCM monthly outreach.      4/18/24 This RN unable to reach patient and will reach out next month. Left message on patient's voicemail.

## 2024-04-26 ENCOUNTER — HOSPITAL ENCOUNTER (OUTPATIENT)
Facility: MEDICAL CENTER | Age: 74
End: 2024-04-26
Attending: INTERNAL MEDICINE
Payer: MEDICARE

## 2024-04-26 LAB
C DIFF DNA SPEC QL NAA+PROBE: NEGATIVE
C DIFF TOX GENS STL QL NAA+PROBE: NEGATIVE
C PARVUM AG STL QL IA.RAPID: NEGATIVE
G LAMBLIA AG STL QL IA.RAPID: NEGATIVE

## 2024-04-26 PROCEDURE — 87329 GIARDIA AG IA: CPT

## 2024-04-26 PROCEDURE — 87045 FECES CULTURE AEROBIC BACT: CPT

## 2024-04-26 PROCEDURE — 87899 AGENT NOS ASSAY W/OPTIC: CPT

## 2024-04-26 PROCEDURE — 83993 ASSAY FOR CALPROTECTIN FECAL: CPT

## 2024-04-26 PROCEDURE — 87493 C DIFF AMPLIFIED PROBE: CPT

## 2024-04-26 PROCEDURE — 82653 EL-1 FECAL QUANTITATIVE: CPT

## 2024-04-26 PROCEDURE — 87328 CRYPTOSPORIDIUM AG IA: CPT

## 2024-04-26 PROCEDURE — 87046 STOOL CULTR AEROBIC BACT EA: CPT

## 2024-04-27 LAB
E COLI SXT1+2 STL IA: NORMAL
SIGNIFICANT IND 70042: NORMAL
SITE SITE: NORMAL
SOURCE SOURCE: NORMAL

## 2024-04-29 LAB
BACTERIA STL CULT: NORMAL
E COLI SXT1+2 STL IA: NORMAL
SIGNIFICANT IND 70042: NORMAL
SITE SITE: NORMAL
SOURCE SOURCE: NORMAL

## 2024-04-30 LAB
CALPROTECTIN STL-MCNT: 277 UG/G
ELASTASE PANC STL-MCNT: 114 UG/G

## 2024-05-01 ENCOUNTER — PATIENT OUTREACH (OUTPATIENT)
Dept: HEALTH INFORMATION MANAGEMENT | Facility: OTHER | Age: 74
End: 2024-05-01
Payer: MEDICARE

## 2024-05-01 DIAGNOSIS — N18.31 CHRONIC KIDNEY DISEASE, STAGE 3A: ICD-10-CM

## 2024-05-01 DIAGNOSIS — E78.5 DYSLIPIDEMIA: ICD-10-CM

## 2024-05-01 NOTE — PROGRESS NOTES
This RN returned patient call. Patient stated that she had some stool testing done and she saw the results on My Chart, which were abnormal. Patient inquiring about the next steps. This RN called [facility] and was informed that patient's results are on providers top priority to review and will notify patient once reviewed. This RN called patient back with this information and she verbalizes understanding. This RN instructed patient to call [facility] phone number at [phone] if further questions.

## 2024-05-06 NOTE — TELEPHONE ENCOUNTER
Requested Prescriptions     Pending Prescriptions Disp Refills    DULoxetine (CYMBALTA) 30 MG Cap DR Particles 30 Capsule 5     Sig: Take 1 Capsule by mouth every day.      Last office visit: 3/29/24  Last lab: 1/8/24

## 2024-05-07 RX ORDER — DULOXETIN HYDROCHLORIDE 30 MG/1
30 CAPSULE, DELAYED RELEASE ORAL DAILY
Qty: 90 CAPSULE | Refills: 3 | Status: SHIPPED | OUTPATIENT
Start: 2024-05-07

## 2024-05-15 ENCOUNTER — PATIENT OUTREACH (OUTPATIENT)
Dept: HEALTH INFORMATION MANAGEMENT | Facility: OTHER | Age: 74
End: 2024-05-15
Payer: MEDICARE

## 2024-05-15 DIAGNOSIS — E78.5 DYSLIPIDEMIA: ICD-10-CM

## 2024-05-15 DIAGNOSIS — N18.31 CHRONIC KIDNEY DISEASE, STAGE 3A: ICD-10-CM

## 2024-05-15 NOTE — PROGRESS NOTES
This RN spoke with patient for monthly outreach. Hermelinda reports that colonoscopy is scheduled for June 6th at [facility]. She states that they are requesting orders to hold Xaralto prior to procedure. Fax # is [phone].  Patient reports that diarrhea episodes seem to get better and then starts again. She has been eating a bland diet per GI and has stopped probiotics per GI orders as well. She is using metamucil on most days. She had an all night episode of diarrhea and stomach pain not too long ago because she ate out at a restaurant. This RN provided education regarding taking imodium per last GI note for diarrhea and she verbalizes understanding. She sometimes has a bout with chills but no fever. This RN instructed her to call GI if she notices any unusual or new symptoms and go to the ER if she does get a fever. Hermelinda verbalizes understanding. Patient states that all medications are taken as prescribed with no medication changes and no new allergies. Appointments and lab reviewed with patient.Patient reports no recent falls, recent hospital admissions or ER visits. This RN instructed patient to call [facility] phone number at [phone] if any further questions, concerns or needs. Patient verbalizes understanding.  Will continue current plan of care.        Education: medications, appointments    Goals: Continue taking metamucil  and imodium per GI    Barriers: knowledge deficit, or disease process.    Interventions: RN will continue to call patient for monthly outreach. RN will provide resources per patient request.    POC:Continue current plan of care    Progression: Progressing    Next Outreach: one month

## 2024-06-12 ENCOUNTER — PATIENT OUTREACH (OUTPATIENT)
Dept: HEALTH INFORMATION MANAGEMENT | Facility: OTHER | Age: 74
End: 2024-06-12
Payer: MEDICARE

## 2024-06-12 DIAGNOSIS — E78.5 DYSLIPIDEMIA: ICD-10-CM

## 2024-06-12 DIAGNOSIS — N18.31 CHRONIC KIDNEY DISEASE, STAGE 3A: ICD-10-CM

## 2024-06-14 NOTE — PROGRESS NOTES
This RN spoke with patient for monthly and quarterly PCM outreach. Patient reports she had a colonoscopy done on June 6 for changes in bowels and stated that there is no cancer, but was prescribed steroids for inflammation. GI also ordered gastric emptying study and CT of abdomen. Patient stated that endoscopy negative for Nuñez's Esophagus. GI recommending Endoscopy every 3-4 years. Patient reports she is still having irregular bowel movements 7-8 per day and thought the duloxetine could be part of the problem and wanted to stop taking it.  This RN instructed her not to do anything different regarding her medications until she sees her PCP in July. Patient verbalizes understanding. Patient states that all medications are taken as prescribed with no medication changes and no new allergies. Appointments and lab reviewed with patient. Patient reports no recent falls, recent hospital admissions or ER visits. This RN instructed patient to call PCM phone number at [phone] if any further questions, concerns or needs. Patient verbalizes understanding. Will continue current plan of care.    Quarterly chart review completed. Pt continues to benefit from personal care management program.     Education: medications, appointments, bowel habits    Goals/POC: Continue taking all medications as prescribed. Remain free from falls.     Barriers: Advanced age, knowledge deficit, disease process.    Interventions: RN will continue to call patient for monthly outreach. RN will provide resources per patient request and provide education.    Progression: Progressing    Next Outreach: One month

## 2024-06-24 ENCOUNTER — HOSPITAL ENCOUNTER (OUTPATIENT)
Dept: LAB | Facility: MEDICAL CENTER | Age: 74
End: 2024-06-24
Payer: MEDICARE

## 2024-06-24 LAB
BUN SERPL-MCNC: 16 MG/DL (ref 8–22)
CREAT SERPL-MCNC: 0.92 MG/DL (ref 0.5–1.4)
GFR SERPLBLD CREATININE-BSD FMLA CKD-EPI: 65 ML/MIN/1.73 M 2

## 2024-06-24 PROCEDURE — 84520 ASSAY OF UREA NITROGEN: CPT

## 2024-06-24 PROCEDURE — 82565 ASSAY OF CREATININE: CPT

## 2024-06-24 PROCEDURE — 36415 COLL VENOUS BLD VENIPUNCTURE: CPT

## 2024-06-27 ENCOUNTER — HOSPITAL ENCOUNTER (OUTPATIENT)
Dept: RADIOLOGY | Facility: MEDICAL CENTER | Age: 74
End: 2024-06-27
Payer: MEDICARE

## 2024-06-27 DIAGNOSIS — R68.81 EARLY SATIETY: ICD-10-CM

## 2024-06-27 DIAGNOSIS — R63.0 ANOREXIA: ICD-10-CM

## 2024-06-27 DIAGNOSIS — R63.4 LOSS OF WEIGHT: ICD-10-CM

## 2024-06-27 PROCEDURE — 700117 HCHG RX CONTRAST REV CODE 255

## 2024-06-27 PROCEDURE — 74177 CT ABD & PELVIS W/CONTRAST: CPT

## 2024-06-27 RX ADMIN — IOHEXOL 25 ML: 240 INJECTION, SOLUTION INTRATHECAL; INTRAVASCULAR; INTRAVENOUS; ORAL at 11:03

## 2024-06-27 RX ADMIN — IOHEXOL 100 ML: 350 INJECTION, SOLUTION INTRAVENOUS at 10:45

## 2024-07-02 ENCOUNTER — HOSPITAL ENCOUNTER (OUTPATIENT)
Dept: RADIOLOGY | Facility: MEDICAL CENTER | Age: 74
End: 2024-07-02
Payer: MEDICARE

## 2024-07-02 DIAGNOSIS — N94.89 ENDOMETRIAL MASS: ICD-10-CM

## 2024-07-02 PROCEDURE — 76830 TRANSVAGINAL US NON-OB: CPT

## 2024-07-03 ENCOUNTER — HOSPITAL ENCOUNTER (OUTPATIENT)
Dept: RADIOLOGY | Facility: MEDICAL CENTER | Age: 74
End: 2024-07-03
Payer: MEDICARE

## 2024-07-03 DIAGNOSIS — R68.81 EARLY SATIETY: ICD-10-CM

## 2024-07-03 DIAGNOSIS — R63.0 ANOREXIA: ICD-10-CM

## 2024-07-03 DIAGNOSIS — R63.4 LOSS OF WEIGHT: ICD-10-CM

## 2024-07-03 PROCEDURE — A9541 TC99M SULFUR COLLOID: HCPCS

## 2024-07-19 ENCOUNTER — PATIENT OUTREACH (OUTPATIENT)
Dept: HEALTH INFORMATION MANAGEMENT | Facility: OTHER | Age: 74
End: 2024-07-19
Payer: MEDICARE

## 2024-07-19 DIAGNOSIS — N18.31 CHRONIC KIDNEY DISEASE, STAGE 3A: ICD-10-CM

## 2024-07-19 DIAGNOSIS — Z86.711 HISTORY OF PULMONARY EMBOLUS (PE): ICD-10-CM

## 2024-07-19 DIAGNOSIS — E78.5 DYSLIPIDEMIA: ICD-10-CM

## 2024-07-23 DIAGNOSIS — K31.84 GASTROPARESIS: ICD-10-CM

## 2024-07-24 ENCOUNTER — HOSPITAL ENCOUNTER (OUTPATIENT)
Dept: LAB | Facility: MEDICAL CENTER | Age: 74
End: 2024-07-24
Attending: FAMILY MEDICINE
Payer: MEDICARE

## 2024-07-24 ENCOUNTER — HOSPITAL ENCOUNTER (OUTPATIENT)
Dept: LAB | Facility: MEDICAL CENTER | Age: 74
End: 2024-07-24
Payer: MEDICARE

## 2024-07-24 DIAGNOSIS — N18.31 CHRONIC KIDNEY DISEASE, STAGE 3A: ICD-10-CM

## 2024-07-24 DIAGNOSIS — D58.2 ELEVATED HEMOGLOBIN (HCC): ICD-10-CM

## 2024-07-24 DIAGNOSIS — R79.89 HIGH SERUM VITAMIN B12: ICD-10-CM

## 2024-07-24 LAB
ALBUMIN SERPL BCP-MCNC: 4.3 G/DL (ref 3.2–4.9)
ALBUMIN/GLOB SERPL: 1.8 G/DL
ALP SERPL-CCNC: 70 U/L (ref 30–99)
ALT SERPL-CCNC: 18 U/L (ref 2–50)
ANION GAP SERPL CALC-SCNC: 9 MMOL/L (ref 7–16)
AST SERPL-CCNC: 16 U/L (ref 12–45)
BASOPHILS # BLD AUTO: 0.9 % (ref 0–1.8)
BASOPHILS # BLD: 0.04 K/UL (ref 0–0.12)
BILIRUB SERPL-MCNC: 0.5 MG/DL (ref 0.1–1.5)
BUN SERPL-MCNC: 12 MG/DL (ref 8–22)
CALCIUM ALBUM COR SERPL-MCNC: 9.5 MG/DL (ref 8.5–10.5)
CALCIUM SERPL-MCNC: 9.7 MG/DL (ref 8.5–10.5)
CHLORIDE SERPL-SCNC: 105 MMOL/L (ref 96–112)
CO2 SERPL-SCNC: 27 MMOL/L (ref 20–33)
CREAT SERPL-MCNC: 0.91 MG/DL (ref 0.5–1.4)
EOSINOPHIL # BLD AUTO: 0.1 K/UL (ref 0–0.51)
EOSINOPHIL NFR BLD: 2.2 % (ref 0–6.9)
ERYTHROCYTE [DISTWIDTH] IN BLOOD BY AUTOMATED COUNT: 40.7 FL (ref 35.9–50)
EST. AVERAGE GLUCOSE BLD GHB EST-MCNC: 100 MG/DL
GFR SERPLBLD CREATININE-BSD FMLA CKD-EPI: 66 ML/MIN/1.73 M 2
GLOBULIN SER CALC-MCNC: 2.4 G/DL (ref 1.9–3.5)
GLUCOSE SERPL-MCNC: 86 MG/DL (ref 65–99)
HBA1C MFR BLD: 5.1 % (ref 4–5.6)
HCT VFR BLD AUTO: 48.4 % (ref 37–47)
HGB BLD-MCNC: 16 G/DL (ref 12–16)
IMM GRANULOCYTES # BLD AUTO: 0.01 K/UL (ref 0–0.11)
IMM GRANULOCYTES NFR BLD AUTO: 0.2 % (ref 0–0.9)
LYMPHOCYTES # BLD AUTO: 1.48 K/UL (ref 1–4.8)
LYMPHOCYTES NFR BLD: 32.3 % (ref 22–41)
MCH RBC QN AUTO: 29.4 PG (ref 27–33)
MCHC RBC AUTO-ENTMCNC: 33.1 G/DL (ref 32.2–35.5)
MCV RBC AUTO: 88.8 FL (ref 81.4–97.8)
MONOCYTES # BLD AUTO: 0.41 K/UL (ref 0–0.85)
MONOCYTES NFR BLD AUTO: 9 % (ref 0–13.4)
NEUTROPHILS # BLD AUTO: 2.54 K/UL (ref 1.82–7.42)
NEUTROPHILS NFR BLD: 55.4 % (ref 44–72)
NRBC # BLD AUTO: 0 K/UL
NRBC BLD-RTO: 0 /100 WBC (ref 0–0.2)
PHOSPHATE SERPL-MCNC: 3.6 MG/DL (ref 2.5–4.5)
PLATELET # BLD AUTO: 282 K/UL (ref 164–446)
PMV BLD AUTO: 10.5 FL (ref 9–12.9)
POTASSIUM SERPL-SCNC: 4.3 MMOL/L (ref 3.6–5.5)
PROT SERPL-MCNC: 6.7 G/DL (ref 6–8.2)
RBC # BLD AUTO: 5.45 M/UL (ref 4.2–5.4)
SODIUM SERPL-SCNC: 141 MMOL/L (ref 135–145)
T4 FREE SERPL-MCNC: 1.15 NG/DL (ref 0.93–1.7)
VIT B12 SERPL-MCNC: 917 PG/ML (ref 211–911)
WBC # BLD AUTO: 4.6 K/UL (ref 4.8–10.8)

## 2024-07-24 PROCEDURE — 80053 COMPREHEN METABOLIC PANEL: CPT

## 2024-07-24 PROCEDURE — 83036 HEMOGLOBIN GLYCOSYLATED A1C: CPT | Mod: GA

## 2024-07-24 PROCEDURE — 84439 ASSAY OF FREE THYROXINE: CPT | Mod: GA

## 2024-07-24 PROCEDURE — 36415 COLL VENOUS BLD VENIPUNCTURE: CPT | Mod: GA

## 2024-07-24 PROCEDURE — 85025 COMPLETE CBC W/AUTO DIFF WBC: CPT

## 2024-07-24 PROCEDURE — 84100 ASSAY OF PHOSPHORUS: CPT

## 2024-07-24 PROCEDURE — 82607 VITAMIN B-12: CPT

## 2024-08-07 ENCOUNTER — TELEPHONE (OUTPATIENT)
Dept: VASCULAR LAB | Facility: MEDICAL CENTER | Age: 74
End: 2024-08-07

## 2024-08-07 ENCOUNTER — OFFICE VISIT (OUTPATIENT)
Dept: MEDICAL GROUP | Facility: PHYSICIAN GROUP | Age: 74
End: 2024-08-07
Payer: MEDICARE

## 2024-08-07 VITALS
DIASTOLIC BLOOD PRESSURE: 68 MMHG | WEIGHT: 152 LBS | BODY MASS INDEX: 26.93 KG/M2 | RESPIRATION RATE: 12 BRPM | HEART RATE: 73 BPM | OXYGEN SATURATION: 95 % | SYSTOLIC BLOOD PRESSURE: 100 MMHG | TEMPERATURE: 98.6 F | HEIGHT: 63 IN

## 2024-08-07 DIAGNOSIS — D68.51 FACTOR V LEIDEN MUTATION (HCC): ICD-10-CM

## 2024-08-07 DIAGNOSIS — Z86.711 HISTORY OF PULMONARY EMBOLUS (PE): ICD-10-CM

## 2024-08-07 DIAGNOSIS — K52.9 COLITIS: ICD-10-CM

## 2024-08-07 PROCEDURE — 3078F DIAST BP <80 MM HG: CPT | Performed by: FAMILY MEDICINE

## 2024-08-07 PROCEDURE — 99214 OFFICE O/P EST MOD 30 MIN: CPT | Performed by: FAMILY MEDICINE

## 2024-08-07 PROCEDURE — 3074F SYST BP LT 130 MM HG: CPT | Performed by: FAMILY MEDICINE

## 2024-08-07 RX ORDER — BUDESONIDE 3 MG/1
CAPSULE, COATED PELLETS ORAL
COMMUNITY
Start: 2024-06-14

## 2024-08-07 ASSESSMENT — FIBROSIS 4 INDEX: FIB4 SCORE: 0.99

## 2024-08-08 NOTE — TELEPHONE ENCOUNTER
Received referral from Dr Long - patient having cystoscopy on 9/20/24. Patient instructed to hold 48 hours prior to procedure without bridge therapy. Resume night of procedure or as instructed by provider.     Alicia LarsonD

## 2024-08-13 NOTE — PROGRESS NOTES
Subjective:   Hermelinda Mosley is a 74 y.o. female here today for evaluation and management of:     Colitis  Tapering course of steroid  Negative cdiff.       Factor V Leiden mutation (HCC)  Continues on xarelto with no adverse bleeding events.   Pt had a PE in the past, also had superficial throbophlebitis  Ref to anticoagulation clinic provided for monitoring and recommendation for holding before procedures. Scheduled for cystoscopy           Current medicines (including changes today)  Current Outpatient Medications   Medication Sig Dispense Refill    budesonide (ENTOCORT EC) 3 MG Cap DR Particles capsule       rivaroxaban (XARELTO) 10 MG Tab tablet Take 1 Tablet by mouth with dinner. 90 Tablet 3    omeprazole (PRILOSEC) 20 MG delayed-release capsule Take 1 Capsule by mouth 1 time a day as needed (acid reflux). 30 Capsule 3    cyanocobalamin (VITAMIN B-12) 500 MCG Tab Take 500 mcg by mouth every day.      magnesium oxide (MAG-OX) 400 MG Tab tablet Take 250 mg by mouth 1 time a day as needed (for leg cramps). Leg Cramps      lidocaine (LIDODERM) 5 % Patch Place 1 Patch on the skin every 24 hours. 10 Patch 0    diclofenac sodium (VOLTAREN) 1 % Gel Apply 2 g topically 1 time a day as needed (pain). 50 g 0    methocarbamol (ROBAXIN) 500 MG Tab Take 1 Tablet by mouth 3 times a day. 90 Tablet 3    CALCIUM PO Take 1 Tablet by mouth every day.      Multiple Vitamins-Minerals (MULTIVITAMIN PO) Take 1 Tablet by mouth every day.      Cholecalciferol (VITAMIN D PO) Take  by mouth.       No current facility-administered medications for this visit.     She  has a past medical history of Abdominal pain, unspecified site, Alpha 1-antitrypsin PiMS phenotype, Back pain, Bronchitis, Chickenpox, Degeneration of lumbar or lumbosacral intervertebral disc, Diarrhea, DVT (deep venous thrombosis) (HCC), Esophageal reflux, [place] measles, Hernia of unspecified site of abdominal cavity without mention of obstruction or gangrene  "(repaired), History of colonoscopy (2005=normal), Influenza, Irritable bowel syndrome, Mumps, Pap smear (due), Pneumonia, Recurrent UTI (9/4/2009), Restless leg syndrome, Screening mammogram (10/30/2008=normal), Superficial thrombophlebitis of left leg (10/20/2017), Swelling (9/4/2009), Tonsillitis, Unspecified hemorrhoids without mention of complication, and Vein, varicose (stripped).    She has no past medical history of Encounter for long-term (current) use of other medications.    ROS  No chest pain, no shortness of breath, no abdominal pain       Objective:     /68 (BP Location: Left arm, Patient Position: Sitting, BP Cuff Size: Adult)   Pulse 73   Temp 37 °C (98.6 °F) (Temporal)   Resp 12   Ht 1.588 m (5' 2.5\")   Wt 68.9 kg (152 lb)   SpO2 95%  Body mass index is 27.36 kg/m².   Physical Exam:  Constitutional: Alert, no distress, well-groomed.  Skin: No rashes in visible areas.  Eye: Round. Conjunctiva clear, lids normal. No icterus.   ENMT: Lips pink without lesions, good dentition, moist mucous membranes. Phonation normal.  Neck: No masses, no thyromegaly. Moves freely without pain.  Respiratory: Unlabored respiratory effort, no cough or audible wheeze  Psych: Alert and oriented x3, normal affect and mood.      Assessment and Plan:   The following treatment plan was discussed    1. Factor V Leiden mutation (HCC)  - Referral to Anticoagulation Monitoring    2. History of pulmonary embolus (PE)  - Referral to Anticoagulation Monitoring    3. Colitis    Other orders  - budesonide (ENTOCORT EC) 3 MG Cap DR Particles capsule      Followup: Return in about 3 months (around 11/7/2024) for follow up.           "

## 2024-08-15 ENCOUNTER — APPOINTMENT (OUTPATIENT)
Dept: RADIOLOGY | Facility: MEDICAL CENTER | Age: 74
End: 2024-08-15
Payer: MEDICARE

## 2024-08-21 ENCOUNTER — PATIENT OUTREACH (OUTPATIENT)
Dept: HEALTH INFORMATION MANAGEMENT | Facility: OTHER | Age: 74
End: 2024-08-21
Payer: MEDICARE

## 2024-08-21 DIAGNOSIS — N18.31 CHRONIC KIDNEY DISEASE, STAGE 3A: ICD-10-CM

## 2024-08-21 DIAGNOSIS — E78.5 DYSLIPIDEMIA: ICD-10-CM

## 2024-08-21 NOTE — PROGRESS NOTES
8/21/24 1110 This RN left message on patient's voicemail with contact information requesting a return call for PCM monthly outreach.   8/22/24 1451This RN left message on patient's voicemail with contact information requesting a return call for PCM monthly outreach.   8/23/24 0951This RN left message on patient's voicemail with contact information requesting a return call for PCM monthly outreach.     Patient returned call. She stated that she is able to confidently manage her healthcare conditions at this time. RN will graduate patient from [facility] program. Patient agrees with plan.    Note to patient: The 21st Century Cures Act requires that medical notes like this be available to patients in the interest of transparency. However, be advised this is a medical document. It is intended as peer to peer communication. It is written in medical language and may contain abbreviations or verbiage that are unfamiliar. It may appear blunt or direct. Medical documents are intended to carry relevant information, facts as evident, and the clinical opinion of the practitioner.    CHIEF COMPLAINT:    Follow up     SUBJECTIVE:  Marcell Boyer is a 82 year old male who presented for follow up recent ER visit.  Presented to [facility] on 4/4 while he was evaluated by Dr Sultana for GCA. His HR was 34. He was sent to the ER for further evaluation. He reported no chest pain SOB or palpitations  He did have dizziness when he stood up  Per ER notes: \"Initial EKG interpreted by me, shows right bundle branch block and bigeminy pattern, rate 67, no no acute ST changes seen.  Cardiac monitor shows sinus rhythm with a rate of 66 with PVCs.  Pulse ox 97% on room air, normal.\"  CBC showed no leukocytosis or anemia. CMP and trop ok. Glucose 104. Per notes was not orthostatic.   He was able to ambulate with no difficulties and was felt stable for discharge.   Pt is on coreg BID  Seen Dr Kowalski 3/14/23 and HR was 66. No medication changes at the time.  Accompanied by daughter  Lola   Since discharge he has been doing ok  No dizziness or palpitations  He does get occ chest discomfort not pain that he takes TUMS which helps  At times he can experience with walking with no SOB nausea or sweating  Subsides on its own   Currently asymptomatic   No leg swelling  Appetite is fair  Off of prednisone        4 d ago    Troponin I, High Sensitivity 0 - 20 pg/mL 14    Resulting Agency  [facility]   Narrative  Performed by [facility]  Suspect Myocardial injury:    Female: > 15 pg/mL   Suspect Myocardial injury:    Male:   > 20 pg/mL     Troponin  values above the URL are indicative of myocardial injury.  The myocardial injury is considered acute if there is a rise and/or fall of the cTn values along with significant pre-test clinical suspicion.  Specimen Collected: 04/04/23 12:12 Last Resulted: 04/04/23 12:51   Received From: [facility]  Result Received: 04/08/23 07:23    View Encounter       Contains abnormal data CBC with Differential  Specimen:  Blood - Vein specimen (specimen)   Ref Range & Units 4 d ago   WBC 3.6 - 10.2 10ˆ3/µL 8.0    RBC (Based on documented legal sex) 4.40-6.00 10ˆ6/µL 4.28 Low     HGB (Based on documented legal sex) 13.2-18.0 g/dL 13.4    HCT (Based on documented legal sex) 41.0-55.0 % 40.0 Low     MCV 82.0 - 99.0 fL 93.5    MCH 27.0 - 33.0 pg 31.3    MCHC 32.0 - 36.0 g/dL 33.5    RDW 11.0 - 15.0 % 13.3     - 450 10ˆ3/µL 189    MPV 9.8 - 12.7 fL 10.4    NRBC's 0 % 0.0    Absolute NRBCs 0 10ˆ3/µL 0.0    Resulting Agency  [facility]   Narrative  Performed by [facility]  4/4/2023 12:23 PM: P indicates partial results on a panel have been released. Additional results will follow.   4/4/2023 12:46 PM: This result has been final verified. No additional or changed results are expected.  Specimen Collected: 04/04/23 12:12 Last Resulted: 04/04/23 12:46   Received From: [facility]  Result Received: 04/08/23 07:23    View Encounter      Recent Data from [facility]  Related to CBC with Differential  Component 04/04/23 03/21/23 02/21/23 01/24/23 12/27/22 11/17/22    WBC 8.0 6.4 5.8 5.6 6.9 6.3   RBC 4.28 Low  4.13 Low  4.00 Low  3.75 Low  3.71 Low  3.99 Low    HGB 13.4 12.7 Low  12.9 Low  12.6 Low  12.5 Low  13.4   HCT 40.0 Low  38.6 Low  38.6 Low  37.2 Low  38.1 Low  41.2   MCV 93.5 93.5 96.5 99.2 High  102.7 High  103.3 High    MCH 31.3 30.8 32.3 33.6 High  33.7 High  33.6 High    MCHC 33.5 32.9 33.4 33.9 32.8 32.5   RDW 13.3 13.2 12.9 12.9 13.7 14.4    221 222 182  184 212   MPV 10.4 9.2 Low  9.4 Low  9.2 Low  9.3 Low  9.6 Low    NRBC's 0.0 0.0 0.0 0.0 0.0 0.0   Absolute NRBCs 0.0 0.0 0.0 0.0 0.0 0.0   View all related data      Contains abnormal data Comp Metabolic Panel  Specimen:  Blood - Vein specimen (specimen)   Ref Range & Units 4 d ago   Sodium 133 - 146 mmol/L 139    Potassium 3.5 - 5.1 mmol/L 4.1    Chloride 98 - 107 mmol/L 105    Carbon Dioxide 21 - 31 mmol/L 27    Anion Gap 4 - 13 mmol/L 7    Blood Urea Nitrogen 7 - 25 mg/dL 30 High     Creatinine 0.60 - 1.30 mg/dL 1.50 High     eGFRcr (CKD-EPI 2021) >=60 mL/min/1.73 m² 46 Low     Calcium 8.3 - 10.5 mg/dL 9.2    Glucose 70 - 100 mg/dL 104 High     Protein, Total 6.4 - 8.3 g/dL 6.5    Albumin 3.5 - 5.0 g/dL 4.0    ALT 11 - 51 units/L 12    Alkaline Phosphatase 34 - 104 units/L 45    AST 13 - 39 units/L 18    Bilirubin, Total 0.2 - 1.2 mg/dL 0.7    Resulting Agency  [facility]   Specimen Collected: 04/04/23 12:12 Last Resulted: 04/04/23 12:51   Received From: [facility]  Result Received: 04/08/23 07:23    View Encounter      Recent Data from [facility]  Related to Comp Metabolic Panel  Component 04/04/23 03/21/23 02/21/23 01/24/23 12/27/22 11/29/22    Sodium 139 142 139 141 140 139   Potassium 4.1 4.4 4.0 4.1 4.0 4.0   Chloride 105 108 High  104 107 106 106   Carbon Dioxide 27 29 27 28 28 26   Anion Gap 7 5 8 6 6 7   Blood Urea Nitrogen 30 High  25 26 High  24 31 High  28 High    Creatinine 1.50 High  1.33 High  1.21 1.35 High  1.32 High  1.42 High    eGFRcr (CKD-EPI 2021) 46 Low  53 Low  60 52 Low  54 Low  49 Low    Calcium 9.2 9.3 9.4 9.1 9.4 8.6   Glucose 104 High  111 High  91 127 High  112 High  243 High    Protein, Total 6.5 6.6 6.4 6.0 Low  6.1 Low  5.4 Low    Albumin 4.0 3.7 4.0 3.7 3.6 3.3 Low    ALT 12 10 Low  11 8 Low  10 Low  13   Alkaline Phosphatase 45 48 43 35 34 26 Low    AST 18 16 17 13 15 17   Bilirubin, Total 0.7 0.6 0.7 0.8 0.5 0.8   View all related  data     Magnesium  Specimen:  Blood - Vein specimen (specimen)   Ref Range & Units 4 d ago   Magnesium 1.7 - 2.8 mg/dL 2.0    Resulting Agency  [facility]   Specimen Collected: 04/04/23 12:12 Last Resulted: 04/04/23 12:51   Received From: [facility]  Result Received: 04/08/23 07:23    View Encounter       Contains abnormal data Blood Smear Exam  Specimen:  Blood - Vein specimen (specimen)   Ref Range & Units 4 d ago   Neutrophils 37.0 - 72.0 % 76.0 High     Lymphocytes 16.0 - 48.0 % 15.0 Low     Reactive Lymphocytes % 2.0    Monocytes 4.0 - 14.0 % 6.0    Eosinophils 0.0 - 9.0 % 1.0    Basophils 0.0 - 2.0 % 0.0    Absolute Neutrophils 1.1 - 6.0 10ˆ3/µL 6.1 High     Absolute Lymphocytes 0.7 - 3.4 10ˆ3/µL 1.2    Absolute Reactive Lymphocytes 10ˆ3/µL 0.2    Absolute Monocytes 0.3 - 1.0 10ˆ3/µL 0.5    Absolute Eosinophils 0.0 - 0.6 10ˆ3/µL 0.1    Absolute Basophils 0.0 - 0.1 10ˆ3/µL 0.0    Platelet Morphology  Normal    RBC Morphology Normal Reviewed    Polychromasia (none) Few Abnormal     Resulting Agency  [facility]   Specimen Collected: 04/04/23 12:12 Last Resulted: 04/04/23 12:46   Received From: [facility]  Result Received: 04/08/23 07:23    View Encounter      Recent Data from [facility]  Related to Blood Smear Exam  Component 04/04/23 03/21/23 02/21/23 01/24/23 12/27/22 11/17/22    Neutrophils 76.0 High  66.0 64.0 59.1 70.0 52.2   Lymphocytes 15.0 Low  18.0 14.0 Low  23.2 14.0 Low  31.6   Reactive Lymphocytes 2.0 5.0 6.0 -- 4.0 --   Monocytes 6.0 9.0 16.0 High  13.2 9.0 14.5 High    Eosinophils 1.0 0.0 0.0 0.7 2.0 0.3   Basophils 0.0 2.0 0.0 0.4 1.0 0.3   Absolute Neutrophils 6.1 High  4.2 3.7 3.3 4.8 3.3   Absolute Lymphocytes 1.2 1.2 0.8 1.3 1.0 2.0   Absolute Reactive Lymphocytes 0.2 0.3 0.3 -- 0.3 --   Absolute Monocytes 0.5 0.6 0.9 0.7 0.6 0.9   Absolute Eosinophils 0.1 0.0 0.0 0.0 0.1 0.0   Absolute Basophils 0.0 0.1 0.0 0.0 0.1 0.0    Platelet Morphology Normal Normal Normal -- Normal --   RBC Morphology Reviewed Reviewed Reviewed -- Reviewed --   Polychromasia Few Abnormal  Few Abnormal  -- -- Few Abnormal  --   View all related data      Ref Range & Units 4 d ago   Ventricular Rate BPM 67    Atrial Rate BPM 67    P-R Interval ms 186    QRS Duration ms 130    QT ms 426    QTc ms 450    P Axis degrees 73    R Axis degrees -61    T Axis degrees 52    Resulting Agency  [facility]   Narrative  Performed by [facility]  Sinus rhythm with frequent premature ventricular complexes in a pattern of bigeminy   Right bundle branch block   Left anterior fascicular block    Bifascicular block    Abnormal ECG   No previous ECGs available   Confirmed by Bird Kapoor ([id number]) on 4/4/2023 2:56:10 PM  Specimen Collected: 04/04/23 11:41 Last Resulted: 04/04/23 14:56   Received From: [facility]      Recent Review Flowsheet Data     Date 4/8/2023    Adult PHQ 2 Score 0    Adult PHQ 2 Interpretation No further screening needed    Little interest or pleasure in activity? Not at all    Feeling down, depressed or hopeless? Not at all             REVIEW OF SYSTEMS:     Constitutional:  No fevers or chills.  No fatigue.   Respiratory:  No shortness of breath.  No cough.  No wheezing.  Cardiovascular:  No chest pain.  No palpitations.  No edema.  No syncope.  Gastrointestinal:  No abdominal pain.  No nausea.  No vomiting.  No diarrhea. No constipation.  No blood in stool.     Past Medical History:   Diagnosis Date   • Adrenal incidentaloma (CMD)     Nodularity of the left adrenal gland measuring about 1.1 x 1.7 cm likely related to benign adenoma rather than neoplasm   • BPH (benign prostatic hyperplasia)    • CAD (coronary artery disease)     managed by Dr Fuentes   • CKD (chronic kidney disease) stage 3, GFR 30-59 ml/min (CMD)    • DDD (degenerative disc disease), cervical    • GCA (giant cell arteritis) (CMD)    • Hyperlipidemia    • Hypertension    • MGUS  (monoclonal gammopathy of unknown significance)    • New persistent daily headache    • NSTEMI (non-ST elevated myocardial infarction) (CMD) 06/2022    revealed a  of the mid LAD and mild disease of the dominant left circumflex.  No intervention was performed   • Temporal giant cell arteritis (CMD)    • TIA (transient ischemic attack)      Past Surgical History:   Procedure Laterality Date   • Cardiac catherization  06/2022    revealed a  of the mid LAD and mild disease of the dominant left circumflex.  No intervention was performed   • Cataract extraction extracapsular w/ intraocular lens implantation Right 01/22/2019   • Colonoscopy  2019    due 2024 but will be around 83--to discuss   • Excis pterygium     • Hb temporal artery ligation or biopsy  06/15/2022    Nataly     Family History   Problem Relation Age of Onset   • Heart disease Mother    • Cancer, Colon Father 75     Social History     Socioeconomic History   • Marital status: /Civil Union     Spouse name: Not on file   • Number of children: Not on file   • Years of education: Not on file   • Highest education level: Not on file   Occupational History   • Not on file   Tobacco Use   • Smoking status: Former   • Smokeless tobacco: Never   Vaping Use   • Vaping Use: never used   Substance and Sexual Activity   • Alcohol use: No   • Drug use: No   • Sexual activity: Not on file   Other Topics Concern   • Not on file   Social History Narrative   • Not on file     Social Determinants of Health     Financial Resource Strain: Not on file   Food Insecurity: Not on file   Transportation Needs: Not on file   Physical Activity: Inactive   • Days of Exercise per Week: 0 days   • Minutes of Exercise per Session: 0 min   Stress: Not on file   Social Connections: Not on file   Intimate Partner Violence: Not on file     ALLERGIES:   Allergen Reactions   • Lisinopril Nausea & Vomiting, Other (See Comments), RASH and SWELLING   • Metoprolol HIVES, PRURITUS,  Other (See Comments) and RASH     Current Outpatient Medications   Medication Sig Dispense Refill   • amLODIPine (NORVASC) 10 MG tablet TAKE 1 TABLET BY MOUTH DAILY 90 tablet 1   • pravastatin (PRAVACHOL) 20 MG tablet Take 1 tablet by mouth at bedtime. 90 tablet 1   • fluticasone (FLONASE) 50 MCG/ACT nasal spray Spray 2 sprays in each nostril daily. 16 g 3   • tamsulosin (FLOMAX) 0.4 MG Cap TAKE 1 CAPSULE BY MOUTH DAILY AFTER A MEAL 90 capsule 1   • finasteride (PROSCAR) 5 MG tablet Take 1 tablet by mouth daily. 90 tablet 1   • TOCILizumab (Actemra) 162 MG/0.9ML prefilled syringe for injection Inject 162 mg into the skin.     • carvedilol (COREG) 6.25 MG tablet Take 1 tablet by mouth every 12 hours. 180 tablet 3   • calcium carbonate (TUMS) 500 MG chewable tablet Chew 1 tablet by mouth as needed for Heartburn.     • Multiple Vitamins-Minerals (Multivitamins) Chew Tab Chew 1 tablet by mouth daily.      • Glucosamine-Chondroitin 250-200 MG Cap Take 1 capsule by mouth 2 times daily.      • aspirin 325 MG tablet Take 325 mg by mouth daily.      • calcium carbonate (OS-KERLINE) 1250 (500 Ca) MG tablet Take 1 tablet by mouth 2 times daily.      • TURMERIC PO Take 1 capsule by mouth 2 times daily.        No current facility-administered medications for this visit.       OBJECTIVE:  PHYSICAL EXAM:    Visit Vitals  /60   Pulse 64   Temp 97.2 °F (36.2 °C) (Tympanic)   Resp 16   Ht 5' 7\" (1.702 m)   Wt 68.9 kg (152 lb)   SpO2 99%   BMI 23.81 kg/m²     Sitting /60  Standing /70  HR full minute 60   Constitutional:  Non toxic appearing no acute distress.   HEENT:  Normocephalic, atraumatic.  Conjunctivae pink.  .  Respiratory:  Clear to auscultation bilaterally.  Normal inspiratory effort.   Cardiovascular:  Regular rate and rhythm.  Normal S1, S2.  No S3, S4.  No murmur.  Dorsalis pedis and posterior tibial pulses intact.  Extremities:  No pallor.  No cyanosis. No edema     ASSESSMENT:   1. Bradycardia  Obtain  event monitor to further evaluate   Will discuss with Dr Kowalski regarding BB and advised Lola will let her know once I hear back from him   Monitor BP and HR for now  Keep hydrated   - Cardiac Event Monitor; Future    2. Primary hypertension  Stable  CPM   Monitor BP and HR closely     3. PVC's (premature ventricular contractions)  Obtain event monitor   Limit caffeine  Keep hydrated   - Cardiac Event Monitor; Future    4. Thyroid nodule  Noted on last CT of neck   Repeat in one [facility] thyroid; Future    Follow up in 3-6 months or sooner pending above   Instructions provided as documented in the after visit summary.    The patient indicated understanding of the diagnosis and agreed with the plan of care.     Electronically signed  Sandra Richardson MD  4/8/23

## 2024-08-22 NOTE — ASSESSMENT & PLAN NOTE
Continues on xarelto with no adverse bleeding events.   Pt had a PE in the past, also had superficial throbophlebitis  Ref to anticoagulation clinic provided for monitoring and recommendation for holding before procedures. Scheduled for cystoscopy

## 2024-08-23 ENCOUNTER — PATIENT OUTREACH (OUTPATIENT)
Dept: HEALTH INFORMATION MANAGEMENT | Facility: OTHER | Age: 74
End: 2024-08-23
Payer: MEDICARE

## 2024-08-23 DIAGNOSIS — N18.31 CHRONIC KIDNEY DISEASE, STAGE 3A: ICD-10-CM

## 2024-08-23 DIAGNOSIS — E78.5 DYSLIPIDEMIA: ICD-10-CM

## 2024-09-07 DIAGNOSIS — K21.00 GASTROESOPHAGEAL REFLUX DISEASE WITH ESOPHAGITIS WITHOUT HEMORRHAGE: ICD-10-CM

## 2024-09-10 ENCOUNTER — HOSPITAL ENCOUNTER (OUTPATIENT)
Dept: RADIOLOGY | Facility: MEDICAL CENTER | Age: 74
End: 2024-09-10
Payer: MEDICARE

## 2024-09-10 DIAGNOSIS — K30 DELAYED GASTRIC EMPTYING: ICD-10-CM

## 2024-09-10 PROCEDURE — 74248 X-RAY SM INT F-THRU STD: CPT

## 2024-09-10 NOTE — TELEPHONE ENCOUNTER
[id number]  last OV      Received request via: Pharmacy    Was the patient seen in the last year in this department? Yes    Does the patient have an active prescription (recently filled or refills available) for medication(s) requested? No    Pharmacy Name: [facility]     Does the patient have detention Plus and need 100-day supply? (This applies to ALL medications) Patient does not have SCP

## 2024-09-26 ENCOUNTER — HOSPITAL ENCOUNTER (OUTPATIENT)
Facility: MEDICAL CENTER | Age: 74
End: 2024-09-26
Payer: MEDICARE

## 2024-09-26 PROCEDURE — 87077 CULTURE AEROBIC IDENTIFY: CPT

## 2024-09-26 PROCEDURE — 87046 STOOL CULTR AEROBIC BACT EA: CPT

## 2024-09-26 PROCEDURE — 87899 AGENT NOS ASSAY W/OPTIC: CPT

## 2024-09-26 PROCEDURE — 87177 OVA AND PARASITES SMEARS: CPT

## 2024-09-26 PROCEDURE — 83993 ASSAY FOR CALPROTECTIN FECAL: CPT

## 2024-09-26 PROCEDURE — 87045 FECES CULTURE AEROBIC BACT: CPT

## 2024-09-26 PROCEDURE — 87209 SMEAR COMPLEX STAIN: CPT

## 2024-09-27 LAB
E COLI SXT1+2 STL IA: NORMAL
SIGNIFICANT IND 70042: NORMAL
SITE SITE: NORMAL
SOURCE SOURCE: NORMAL

## 2024-09-30 LAB — CALPROTECTIN STL-MCNT: 24 UG/G

## 2024-10-04 LAB — OVA AND PARASITE, FECAL INTERPRETATION Q0595: NEGATIVE

## 2024-10-13 SDOH — HEALTH STABILITY: PHYSICAL HEALTH: ON AVERAGE, HOW MANY MINUTES DO YOU ENGAGE IN EXERCISE AT THIS LEVEL?: 30 MIN

## 2024-10-13 SDOH — ECONOMIC STABILITY: FOOD INSECURITY: WITHIN THE PAST 12 MONTHS, THE FOOD YOU BOUGHT JUST DIDN'T LAST AND YOU DIDN'T HAVE MONEY TO GET MORE.: NEVER TRUE

## 2024-10-13 SDOH — ECONOMIC STABILITY: FOOD INSECURITY: WITHIN THE PAST 12 MONTHS, YOU WORRIED THAT YOUR FOOD WOULD RUN OUT BEFORE YOU GOT MONEY TO BUY MORE.: NEVER TRUE

## 2024-10-13 SDOH — HEALTH STABILITY: PHYSICAL HEALTH: ON AVERAGE, HOW MANY DAYS PER WEEK DO YOU ENGAGE IN MODERATE TO STRENUOUS EXERCISE (LIKE A BRISK WALK)?: 2 DAYS

## 2024-10-13 SDOH — ECONOMIC STABILITY: INCOME INSECURITY: IN THE LAST 12 MONTHS, WAS THERE A TIME WHEN YOU WERE NOT ABLE TO PAY THE MORTGAGE OR RENT ON TIME?: NO

## 2024-10-13 SDOH — ECONOMIC STABILITY: INCOME INSECURITY: HOW HARD IS IT FOR YOU TO PAY FOR THE VERY BASICS LIKE FOOD, HOUSING, MEDICAL CARE, AND HEATING?: NOT HARD AT ALL

## 2024-10-13 SDOH — HEALTH STABILITY: MENTAL HEALTH
STRESS IS WHEN SOMEONE FEELS TENSE, NERVOUS, ANXIOUS, OR CAN'T SLEEP AT NIGHT BECAUSE THEIR MIND IS TROUBLED. HOW STRESSED ARE YOU?: TO SOME EXTENT

## 2024-10-13 ASSESSMENT — SOCIAL DETERMINANTS OF HEALTH (SDOH)
HOW OFTEN DO YOU HAVE SIX OR MORE DRINKS ON ONE OCCASION: NEVER
DO YOU BELONG TO ANY CLUBS OR ORGANIZATIONS SUCH AS CHURCH GROUPS UNIONS, FRATERNAL OR ATHLETIC GROUPS, OR SCHOOL GROUPS?: NO
HOW OFTEN DO YOU HAVE A DRINK CONTAINING ALCOHOL: MONTHLY OR LESS
HOW OFTEN DO YOU ATTENT MEETINGS OF THE CLUB OR ORGANIZATION YOU BELONG TO?: NEVER
IN A TYPICAL WEEK, HOW MANY TIMES DO YOU TALK ON THE PHONE WITH FAMILY, FRIENDS, OR NEIGHBORS?: MORE THAN THREE TIMES A WEEK
HOW OFTEN DO YOU ATTEND CHURCH OR RELIGIOUS SERVICES?: 1 TO 4 TIMES PER YEAR
HOW OFTEN DO YOU GET TOGETHER WITH FRIENDS OR RELATIVES?: ONCE A WEEK
HOW OFTEN DO YOU ATTEND CHURCH OR RELIGIOUS SERVICES?: 1 TO 4 TIMES PER YEAR
HOW HARD IS IT FOR YOU TO PAY FOR THE VERY BASICS LIKE FOOD, HOUSING, MEDICAL CARE, AND HEATING?: NOT HARD AT ALL
WITHIN THE PAST 12 MONTHS, YOU WORRIED THAT YOUR FOOD WOULD RUN OUT BEFORE YOU GOT THE MONEY TO BUY MORE: NEVER TRUE
HOW OFTEN DO YOU ATTENT MEETINGS OF THE CLUB OR ORGANIZATION YOU BELONG TO?: NEVER
IN A TYPICAL WEEK, HOW MANY TIMES DO YOU TALK ON THE PHONE WITH FAMILY, FRIENDS, OR NEIGHBORS?: MORE THAN THREE TIMES A WEEK
HOW OFTEN DO YOU GET TOGETHER WITH FRIENDS OR RELATIVES?: ONCE A WEEK
IN THE PAST 12 MONTHS, HAS THE ELECTRIC, GAS, OIL, OR WATER COMPANY THREATENED TO SHUT OFF SERVICE IN YOUR HOME?: NO
DO YOU BELONG TO ANY CLUBS OR ORGANIZATIONS SUCH AS CHURCH GROUPS UNIONS, FRATERNAL OR ATHLETIC GROUPS, OR SCHOOL GROUPS?: NO
HOW MANY DRINKS CONTAINING ALCOHOL DO YOU HAVE ON A TYPICAL DAY WHEN YOU ARE DRINKING: 1 OR 2

## 2024-10-13 ASSESSMENT — LIFESTYLE VARIABLES
SKIP TO QUESTIONS 9-10: 1
HOW OFTEN DO YOU HAVE SIX OR MORE DRINKS ON ONE OCCASION: NEVER
HOW OFTEN DO YOU HAVE A DRINK CONTAINING ALCOHOL: MONTHLY OR LESS
AUDIT-C TOTAL SCORE: 1
HOW MANY STANDARD DRINKS CONTAINING ALCOHOL DO YOU HAVE ON A TYPICAL DAY: 1 OR 2

## 2024-10-15 ENCOUNTER — NON-PROVIDER VISIT (OUTPATIENT)
Dept: INTERNAL MEDICINE | Facility: OTHER | Age: 74
End: 2024-10-15
Payer: MEDICARE

## 2024-10-15 DIAGNOSIS — K31.84 GASTROPARESIS: ICD-10-CM

## 2024-10-15 PROCEDURE — 97802 MEDICAL NUTRITION INDIV IN: CPT | Mod: GZ | Performed by: DIETITIAN, REGISTERED

## 2024-10-18 ENCOUNTER — HOSPITAL ENCOUNTER (OUTPATIENT)
Dept: RADIOLOGY | Facility: MEDICAL CENTER | Age: 74
End: 2024-10-18
Attending: FAMILY MEDICINE
Payer: MEDICARE

## 2024-10-18 DIAGNOSIS — Z12.31 VISIT FOR SCREENING MAMMOGRAM: ICD-10-CM

## 2024-10-18 PROCEDURE — 77067 SCR MAMMO BI INCL CAD: CPT

## 2024-10-22 VITALS — BODY MASS INDEX: 27.36 KG/M2 | WEIGHT: 152 LBS

## 2024-10-22 ASSESSMENT — FIBROSIS 4 INDEX: FIB4 SCORE: 0.99

## 2024-11-12 ENCOUNTER — OFFICE VISIT (OUTPATIENT)
Dept: MEDICAL GROUP | Facility: PHYSICIAN GROUP | Age: 74
End: 2024-11-12
Payer: MEDICARE

## 2024-11-12 VITALS
BODY MASS INDEX: 26.05 KG/M2 | HEIGHT: 63 IN | RESPIRATION RATE: 16 BRPM | WEIGHT: 147 LBS | HEART RATE: 72 BPM | DIASTOLIC BLOOD PRESSURE: 80 MMHG | TEMPERATURE: 97 F | SYSTOLIC BLOOD PRESSURE: 136 MMHG

## 2024-11-12 DIAGNOSIS — R79.89 ABNORMAL CBC: ICD-10-CM

## 2024-11-12 DIAGNOSIS — K52.9 COLITIS: ICD-10-CM

## 2024-11-12 DIAGNOSIS — D58.2 ELEVATED HEMOGLOBIN (HCC): ICD-10-CM

## 2024-11-12 DIAGNOSIS — E78.5 DYSLIPIDEMIA: ICD-10-CM

## 2024-11-12 DIAGNOSIS — R73.01 ELEVATED FASTING GLUCOSE: ICD-10-CM

## 2024-11-12 DIAGNOSIS — N18.31 CHRONIC KIDNEY DISEASE, STAGE 3A: ICD-10-CM

## 2024-11-12 PROCEDURE — 99214 OFFICE O/P EST MOD 30 MIN: CPT | Performed by: FAMILY MEDICINE

## 2024-11-12 PROCEDURE — 3079F DIAST BP 80-89 MM HG: CPT | Performed by: FAMILY MEDICINE

## 2024-11-12 PROCEDURE — 3075F SYST BP GE 130 - 139MM HG: CPT | Performed by: FAMILY MEDICINE

## 2024-11-12 ASSESSMENT — FIBROSIS 4 INDEX: FIB4 SCORE: 0.99

## 2024-11-12 NOTE — PROGRESS NOTES
Subjective:   Hermelinda Mosley is a 74 y.o. female here today for evaluation and management of:     Colitis  GI following, pt was treated with tapering steroid course.   Had an upper GI study, had three or four more stool tests which were normal/ improving.     She is feeling better. Only rare diarrhea and only occasional epigastric pain after eating now.   Is doing 1/2 dose of miralax daily which has helped.      Latest Reference Range & Units 04/26/24 10:45 09/26/24 07:35   Calprotectin, Fecal <=49 ug/g 277 (H) 24      She was seen by gyn and found to have a benign polyp which was the cause of the endometrial thickening. No malignancy.     She received her flu and covid vaccinations.     Repeat labs ordered to check blood sugar/glucose.     Current medicines (including changes today)  Current Outpatient Medications   Medication Sig Dispense Refill    omeprazole (PRILOSEC) 20 MG delayed-release capsule Take 1 Capsule by mouth 1 time a day as needed (acid reflux). 90 Capsule 3    rivaroxaban (XARELTO) 10 MG Tab tablet Take 1 Tablet by mouth with dinner. 90 Tablet 3    cyanocobalamin (VITAMIN B-12) 500 MCG Tab Take 500 mcg by mouth every day.      magnesium oxide (MAG-OX) 400 MG Tab tablet Take 250 mg by mouth 1 time a day as needed (for leg cramps). Leg Cramps      lidocaine (LIDODERM) 5 % Patch Place 1 Patch on the skin every 24 hours. 10 Patch 0    diclofenac sodium (VOLTAREN) 1 % Gel Apply 2 g topically 1 time a day as needed (pain). 50 g 0    methocarbamol (ROBAXIN) 500 MG Tab Take 1 Tablet by mouth 3 times a day. 90 Tablet 3    CALCIUM PO Take 1 Tablet by mouth every day.      Multiple Vitamins-Minerals (MULTIVITAMIN PO) Take 1 Tablet by mouth every day.      Cholecalciferol (VITAMIN D PO) Take  by mouth.       No current facility-administered medications for this visit.     She  has a past medical history of Abdominal pain, unspecified site, Alpha 1-antitrypsin PiMS phenotype, Back pain, Bronchitis,  "Chickenpox, Degeneration of lumbar or lumbosacral intervertebral disc, Diarrhea, DVT (deep venous thrombosis) (HCC), Esophageal reflux, Marshallese measles, Hernia of unspecified site of abdominal cavity without mention of obstruction or gangrene (repaired), History of colonoscopy (2005=normal), Influenza, Irritable bowel syndrome, Mumps, Pap smear (due), Pneumonia, Recurrent UTI (9/4/2009), Restless leg syndrome, Screening mammogram (10/30/2008=normal), Superficial thrombophlebitis of left leg (10/20/2017), Swelling (9/4/2009), Tonsillitis, Unspecified hemorrhoids without mention of complication, and Vein, varicose (stripped).    She has no past medical history of Encounter for long-term (current) use of other medications.    ROS  No chest pain, no shortness of breath, no abdominal pain       Objective:     /80 (BP Location: Right arm, Patient Position: Sitting, BP Cuff Size: Adult)   Pulse 72   Temp 36.1 °C (97 °F) (Temporal)   Resp 16   Ht 1.588 m (5' 2.5\")   Wt 66.7 kg (147 lb)  Body mass index is 26.46 kg/m².   Physical Exam:  Constitutional: Alert, no distress.  Skin: Warm, dry, good turgor, no rashes in visible areas.  Eye: Equal, round and reactive, conjunctiva clear, lids normal.  ENMT: Lips without lesions, good dentition, oropharynx clear.  Neck: Trachea midline, no masses, no thyromegaly. No cervical or supraclavicular lymphadenopathy  Respiratory: Unlabored respiratory effort, lungs clear to auscultation, no wheezes, no ronchi.  Cardiovascular: Normal S1, S2, no murmur, no edema.  Abdomen: Soft, non-tender, no masses, no hepatosplenomegaly.  Psych: Alert and oriented x3, normal affect and mood.    Assessment and Plan:   The following treatment plan was discussed    1. Colitis      Followup: No follow-ups on file.           "

## 2024-11-12 NOTE — ASSESSMENT & PLAN NOTE
GI following, pt was treated with tapering steroid course.   Had an upper GI study, had three or four more stool tests which were normal/ improving.     She is feeling better. Only rare diarrhea and only occasional epigastric pain after eating now.   Is doing 1/2 dose of miralax daily which has helped.      Latest Reference Range & Units 04/26/24 10:45 09/26/24 07:35   Calprotectin, Fecal <=49 ug/g 277 (H) 24

## 2025-01-17 NOTE — PROGRESS NOTES
"CC:  Chief Complaint   Patient presents with    Follow-Up     ER 04/24/2023  Right side pain travels to stomach        HISTORY OF PRESENT ILLNESS: Patient is a 72 y.o. female established patient presenting     Problem   Right Flank Pain    Tender to right flank    Mostly at night that wraps around to front of abdomen.  Was seen in  and sent to the ER 4/24/2023  CTA abd completed with no abnormalities  states some diarrhea and fatigue.  Patient does have a history of IBS.  She has tried to take Tylenol and use topical creams to help relieve the pain  She states the only thing that she has done different in the last few months was she recently started a statin in April.    She denies any chills, fevers, nausea, vomiting, constipation, abdominal bloating           Review of Systems   Constitutional:  Negative for chills and fever.   Respiratory:  Negative for shortness of breath.    Cardiovascular:  Negative for chest pain.   Gastrointestinal:  Positive for abdominal pain and diarrhea. Negative for constipation, nausea and vomiting.   Genitourinary:  Positive for flank pain.   Neurological:  Negative for dizziness and headaches.             - NOTE: All other systems reviewed and are negative, except as in HPI.    Exam:    /84   Pulse 67   Temp 36.7 °C (98 °F) (Temporal)   Resp 18   Ht 1.575 m (5' 2\")   Wt 75.9 kg (167 lb 6.4 oz)   SpO2 97%  Body mass index is 30.62 kg/m².    Physical Exam  Constitutional:       Appearance: Normal appearance. She is normal weight.   HENT:      Head: Normocephalic and atraumatic.   Cardiovascular:      Rate and Rhythm: Normal rate and regular rhythm.      Pulses: Normal pulses.      Heart sounds: Normal heart sounds.   Pulmonary:      Effort: Pulmonary effort is normal.      Breath sounds: Normal breath sounds.   Abdominal:      General: Abdomen is flat. Bowel sounds are normal. There is no distension.      Palpations: Abdomen is soft. There is no mass.      Tenderness: " Physical Therapy  Cash Based Dry Needling Session    Visit: 1  Dry Needling Informed Consent Signed: Yes  Patient has been made aware of the cash based cost associated with each of the above procedures: Yes    SUBJECTIVE   Patient presents today with neck and shoulder pain; described radicular pain that seems to start near his R UT and run down the top of his UE to the elbow. Describes pain as nerve pain that does not allow him to sleep, he cannot get comfortable and describes worst pain as gnawing, pulling pain that feels like muscles are pulling length wise in upper arm.     OBJECTIVE    Intense pain that travels from R UT into proximal humeral area with R lateral flexion, cervical flexion and R cervical rotation. Proximal Tinel positive for radial and ulnar nerve irritation.     Treatment   Education about indications, contraindications and potential side effects completed with patient.  Screen Completed   Precautions Contraindications   Local skin lesions  Lyme disease  Local lymphedema  Severe hyperalgesia/allodynia  Metal allergies: nickel and chromium  Abnormal bleeding tendency  Immunodeficiency and/or compromised immune system  Second or third trimester of pregnancy  Vascular Disease  History of spontaneous pneumothorax Local or systemic infections; including the flu  Over implants  Active cancer  Area of lymphatic compromise  Area of lumpectomy/mastectomy  First trimester of pregnancy     Patient has consented to proceed with the intervention.    Dry Needling:   Needles inserted 6   Needles removed 6   Locations and Needle Size In supine,   1 X 50 mm R scalenes, direct  1 X 30 mm R UT, threaded  1 X 40 mm R anterior deltoid, direct with tangential angle  2 X 60 mm R deltoid, Deltoid TPJ  1 X 60 mm R posterior deltoid  Electrical Stimulation: 1:30 minute low frequency, 1:30 min medium frequency, set to patient tolerance.        Treatment duration 14 min   Patient response Improved AROM with decrease in  There is abdominal tenderness. There is no right CVA tenderness, left CVA tenderness, guarding or rebound.      Hernia: No hernia is present.   Musculoskeletal:         General: Normal range of motion.   Skin:     General: Skin is warm and dry.      Capillary Refill: Capillary refill takes less than 2 seconds.   Neurological:      General: No focal deficit present.      Mental Status: She is alert and oriented to person, place, and time.   Psychiatric:         Mood and Affect: Mood normal.         Behavior: Behavior normal.         Thought Content: Thought content normal.         Judgment: Judgment normal.         Assessment/Plan:    1. Right flank pain  Acute uncomplicated condition  With patient just starting her statins back in April this could be related to that.  They did complete a CMP with liver enzymes at the ER which were all normal and trending with the last enzymes she had in the past.  At this time we will go ahead and have her stop her statin and see if she notes improvement if she does we will have her follow back up with primary care provider and possibly look into substituting a lipid-lowering medication    2. Gastroesophageal reflux disease with esophagitis without hemorrhage  Chronic and stable condition  Requesting refills  - omeprazole (PRILOSEC) 20 MG delayed-release capsule; Take 1 Capsule by mouth every day.  Dispense: 90 Capsule; Refill: 3       Discussed with patient possible alternative diagnoses, patient is to take all medications as prescribed.     If symptoms persist FU w/PCP, if symptoms worsen go to emergency room.     If experiencing any side effects from prescribed medications reports to the office immediately or go to emergency room.    Reviewed indication, dosage, usage and potential adverse effects of prescribed medications.     Reviewed risks and benefits of treatment plan. Patient verbalizes understanding of all instruction and verbally agrees to plan.      Return in about 3  nerve pain       ASSESSMENT AND FUTURE RECOMMENDATIONS    Response to treatment: Decreased pain  Re-assessment of dysfunctions following intervention demonstrates: Improved AROM    Based on the above recommendation is to: Continue Cash Based Service    CASH BASED THERAPY DAILY BILLING   Untimed Procedures:  Dry Needling - Unlisted Physical Medicine/Rehabilitation Service Or Procedure  Total time spent with patient: 33 minutes     weeks (around 5/22/2023) for Follow-up evaluation after stopping statins.      Please note that this dictation was created using voice recognition software. I have made every reasonable attempt to correct obvious errors, but I expect that there are errors of grammar and possibly content that I did not discover before finalizing the note.

## 2025-01-24 ENCOUNTER — NON-PROVIDER VISIT (OUTPATIENT)
Dept: INTERNAL MEDICINE | Facility: OTHER | Age: 75
End: 2025-01-24
Payer: MEDICARE

## 2025-01-24 DIAGNOSIS — K31.84 GASTROPARESIS: ICD-10-CM

## 2025-01-24 PROCEDURE — 97803 MED NUTRITION INDIV SUBSEQ: CPT | Performed by: DIETITIAN, REGISTERED

## 2025-01-24 NOTE — PROGRESS NOTES
Hermelinda is here for FU with RD for gastroparesis per referral from July 2024 with diagnosis code of K31.84     Has been trying to follow the gastroparesis handout  Can get confused about some of the cookbooks she has   Trying to eat less  Does not have the nausea and vomiting like most gastroparesis people but the pain in her stomach could be more of an issue with her and not sure if it's her back or her stomach     Found that beans are not ok for her     Reports her diarrhea is better than before  Did not restart the steroids  Taking miralax but not every day   Reports some days she might feel constipated but overall BM are better    Worked with Hermelinda on maintaining a gastroparesis friendly diet and working to stay away from those high FODMAP foods to help her sx. Set goals; rtc with RD in 3 months    Time spent: 45 minutes

## 2025-01-24 NOTE — PATIENT INSTRUCTIONS
Goals:  Keep working on small, frequent meals. Focus on drinking water between meals. Chew your food really well   Well cook those veggies to help them be tolerated easier  Keep an eye on those high FODMAP foods - keep a running list to help with those foods that are uncertain to see if you can identify it   Work on not straining in the bathroom. Get up and drink water and stretch if you cannot pass anything after a little while

## 2025-01-31 VITALS — WEIGHT: 147 LBS | BODY MASS INDEX: 26.46 KG/M2

## 2025-01-31 ASSESSMENT — FIBROSIS 4 INDEX: FIB4 SCORE: 0.99

## 2025-04-16 DIAGNOSIS — Z79.01 LONG TERM (CURRENT) USE OF ANTICOAGULANTS: ICD-10-CM

## 2025-04-17 NOTE — TELEPHONE ENCOUNTER
Received request via: Patient    Was the patient seen in the last year in this department? Yes    Does the patient have an active prescription (recently filled or refills available) for medication(s) requested? No    Pharmacy Name: [facility]     Does the patient have residential Plus and need 100-day supply? (This applies to ALL medications) Patient does not have SCP

## 2025-05-13 ENCOUNTER — HOSPITAL ENCOUNTER (OUTPATIENT)
Dept: LAB | Facility: MEDICAL CENTER | Age: 75
End: 2025-05-13
Attending: FAMILY MEDICINE
Payer: MEDICARE

## 2025-05-13 DIAGNOSIS — R73.01 ELEVATED FASTING GLUCOSE: ICD-10-CM

## 2025-05-13 DIAGNOSIS — N18.31 CHRONIC KIDNEY DISEASE, STAGE 3A: ICD-10-CM

## 2025-05-13 DIAGNOSIS — D58.2 ELEVATED HEMOGLOBIN (HCC): ICD-10-CM

## 2025-05-13 DIAGNOSIS — E78.5 DYSLIPIDEMIA: ICD-10-CM

## 2025-05-13 DIAGNOSIS — R79.89 ABNORMAL CBC: ICD-10-CM

## 2025-05-13 LAB
BASOPHILS # BLD AUTO: 0.5 % (ref 0–1.8)
BASOPHILS # BLD: 0.02 K/UL (ref 0–0.12)
EOSINOPHIL # BLD AUTO: 0.05 K/UL (ref 0–0.51)
EOSINOPHIL NFR BLD: 1.2 % (ref 0–6.9)
ERYTHROCYTE [DISTWIDTH] IN BLOOD BY AUTOMATED COUNT: 38.8 FL (ref 35.9–50)
EST. AVERAGE GLUCOSE BLD GHB EST-MCNC: 105 MG/DL
HBA1C MFR BLD: 5.3 % (ref 4–5.6)
HCT VFR BLD AUTO: 48.9 % (ref 37–47)
HGB BLD-MCNC: 16.3 G/DL (ref 12–16)
IMM GRANULOCYTES # BLD AUTO: 0.01 K/UL (ref 0–0.11)
IMM GRANULOCYTES NFR BLD AUTO: 0.2 % (ref 0–0.9)
LYMPHOCYTES # BLD AUTO: 1.34 K/UL (ref 1–4.8)
LYMPHOCYTES NFR BLD: 33.4 % (ref 22–41)
MCH RBC QN AUTO: 28.9 PG (ref 27–33)
MCHC RBC AUTO-ENTMCNC: 33.3 G/DL (ref 32.2–35.5)
MCV RBC AUTO: 86.7 FL (ref 81.4–97.8)
MONOCYTES # BLD AUTO: 0.35 K/UL (ref 0–0.85)
MONOCYTES NFR BLD AUTO: 8.7 % (ref 0–13.4)
NEUTROPHILS # BLD AUTO: 2.24 K/UL (ref 1.82–7.42)
NEUTROPHILS NFR BLD: 56 % (ref 44–72)
NRBC # BLD AUTO: 0 K/UL
NRBC BLD-RTO: 0 /100 WBC (ref 0–0.2)
PLATELET # BLD AUTO: 259 K/UL (ref 164–446)
PMV BLD AUTO: 10.4 FL (ref 9–12.9)
RBC # BLD AUTO: 5.64 M/UL (ref 4.2–5.4)
WBC # BLD AUTO: 4 K/UL (ref 4.8–10.8)

## 2025-05-13 PROCEDURE — 85025 COMPLETE CBC W/AUTO DIFF WBC: CPT

## 2025-05-13 PROCEDURE — 80053 COMPREHEN METABOLIC PANEL: CPT

## 2025-05-13 PROCEDURE — 83036 HEMOGLOBIN GLYCOSYLATED A1C: CPT | Mod: GA

## 2025-05-13 PROCEDURE — 36415 COLL VENOUS BLD VENIPUNCTURE: CPT

## 2025-05-13 PROCEDURE — 80061 LIPID PANEL: CPT

## 2025-05-14 LAB
ALBUMIN SERPL BCP-MCNC: 4.4 G/DL (ref 3.2–4.9)
ALBUMIN/GLOB SERPL: 1.7 G/DL
ALP SERPL-CCNC: 64 U/L (ref 30–99)
ALT SERPL-CCNC: 19 U/L (ref 2–50)
ANION GAP SERPL CALC-SCNC: 6 MMOL/L (ref 7–16)
AST SERPL-CCNC: 25 U/L (ref 12–45)
BILIRUB SERPL-MCNC: 0.6 MG/DL (ref 0.1–1.5)
BUN SERPL-MCNC: 20 MG/DL (ref 8–22)
CALCIUM ALBUM COR SERPL-MCNC: 9.3 MG/DL (ref 8.5–10.5)
CALCIUM SERPL-MCNC: 9.6 MG/DL (ref 8.5–10.5)
CHLORIDE SERPL-SCNC: 104 MMOL/L (ref 96–112)
CHOLEST SERPL-MCNC: 189 MG/DL (ref 100–199)
CO2 SERPL-SCNC: 29 MMOL/L (ref 20–33)
CREAT SERPL-MCNC: 0.98 MG/DL (ref 0.5–1.4)
GFR SERPLBLD CREATININE-BSD FMLA CKD-EPI: 60 ML/MIN/1.73 M 2
GLOBULIN SER CALC-MCNC: 2.6 G/DL (ref 1.9–3.5)
GLUCOSE SERPL-MCNC: 88 MG/DL (ref 65–99)
HDLC SERPL-MCNC: 62 MG/DL
LDLC SERPL CALC-MCNC: 109 MG/DL
POTASSIUM SERPL-SCNC: 4.2 MMOL/L (ref 3.6–5.5)
PROT SERPL-MCNC: 7 G/DL (ref 6–8.2)
SODIUM SERPL-SCNC: 139 MMOL/L (ref 135–145)
TRIGL SERPL-MCNC: 92 MG/DL (ref 0–149)

## 2025-05-16 ENCOUNTER — OFFICE VISIT (OUTPATIENT)
Dept: MEDICAL GROUP | Facility: PHYSICIAN GROUP | Age: 75
End: 2025-05-16
Payer: MEDICARE

## 2025-05-16 VITALS
BODY MASS INDEX: 29.44 KG/M2 | HEIGHT: 62 IN | TEMPERATURE: 97.4 F | SYSTOLIC BLOOD PRESSURE: 130 MMHG | RESPIRATION RATE: 20 BRPM | HEART RATE: 73 BPM | WEIGHT: 160 LBS | OXYGEN SATURATION: 98 % | DIASTOLIC BLOOD PRESSURE: 78 MMHG

## 2025-05-16 DIAGNOSIS — E78.5 DYSLIPIDEMIA: ICD-10-CM

## 2025-05-16 DIAGNOSIS — D58.2 ELEVATED HEMOGLOBIN (HCC): ICD-10-CM

## 2025-05-16 DIAGNOSIS — N18.31 CHRONIC KIDNEY DISEASE, STAGE 3A: ICD-10-CM

## 2025-05-16 DIAGNOSIS — R73.01 ELEVATED FASTING GLUCOSE: ICD-10-CM

## 2025-05-16 DIAGNOSIS — Z79.01 LONG TERM (CURRENT) USE OF ANTICOAGULANTS: ICD-10-CM

## 2025-05-16 DIAGNOSIS — R93.89 THICKENED ENDOMETRIUM: Primary | ICD-10-CM

## 2025-05-16 DIAGNOSIS — R79.89 ABNORMAL CBC: ICD-10-CM

## 2025-05-16 DIAGNOSIS — E55.9 VITAMIN D DEFICIENCY: ICD-10-CM

## 2025-05-16 PROCEDURE — 3075F SYST BP GE 130 - 139MM HG: CPT | Performed by: FAMILY MEDICINE

## 2025-05-16 PROCEDURE — 99214 OFFICE O/P EST MOD 30 MIN: CPT | Performed by: FAMILY MEDICINE

## 2025-05-16 PROCEDURE — 3078F DIAST BP <80 MM HG: CPT | Performed by: FAMILY MEDICINE

## 2025-05-16 RX ORDER — METHOCARBAMOL 500 MG/1
500 TABLET, FILM COATED ORAL 3 TIMES DAILY
Qty: 90 TABLET | Refills: 3 | Status: SHIPPED | OUTPATIENT
Start: 2025-05-16

## 2025-05-16 RX ORDER — EZETIMIBE 10 MG/1
10 TABLET ORAL DAILY
Qty: 90 TABLET | Refills: 3 | Status: SHIPPED | OUTPATIENT
Start: 2025-05-16

## 2025-05-16 ASSESSMENT — PATIENT HEALTH QUESTIONNAIRE - PHQ9
8. MOVING OR SPEAKING SO SLOWLY THAT OTHER PEOPLE COULD HAVE NOTICED. OR THE OPPOSITE, BEING SO FIGETY OR RESTLESS THAT YOU HAVE BEEN MOVING AROUND A LOT MORE THAN USUAL: NOT AT ALL
4. FEELING TIRED OR HAVING LITTLE ENERGY: NOT AT ALL
1. LITTLE INTEREST OR PLEASURE IN DOING THINGS: NOT AT ALL
SUM OF ALL RESPONSES TO PHQ9 QUESTIONS 1 AND 2: 0
6. FEELING BAD ABOUT YOURSELF - OR THAT YOU ARE A FAILURE OR HAVE LET YOURSELF OR YOUR FAMILY DOWN: NOT AL ALL
7. TROUBLE CONCENTRATING ON THINGS, SUCH AS READING THE NEWSPAPER OR WATCHING TELEVISION: NOT AT ALL
SUM OF ALL RESPONSES TO PHQ QUESTIONS 1-9: 0
5. POOR APPETITE OR OVEREATING: NOT AT ALL
2. FEELING DOWN, DEPRESSED, IRRITABLE, OR HOPELESS: NOT AT ALL
3. TROUBLE FALLING OR STAYING ASLEEP OR SLEEPING TOO MUCH: NOT AT ALL
9. THOUGHTS THAT YOU WOULD BE BETTER OFF DEAD, OR OF HURTING YOURSELF: NOT AT ALL

## 2025-05-16 ASSESSMENT — FIBROSIS 4 INDEX: FIB4 SCORE: 1.64

## 2025-05-16 NOTE — LETTER
[facility]  Maribel Long M.D.  [address] [place]  [place] [address]  Fax: [phone]   Authorization for Release/Disclosure of   Protected Health Information   Name: HERMELINDA MOSLEY : 1950 SSN: [id number]   Address: [address]  [place] [address] Phone:    [phone] (home)    I authorize the entity listed below to release/disclose the PHI below to:   [facility]/Maribel Long M.D. and Maribel Long M.D.   Provider or Entity Name:     Address   City, State, Zip   Phone:      Fax:     Reason for request: continuity of care   Information to be released:    [  ] LAST COLONOSCOPY,  including any PATH REPORT and follow-up  [  ] LAST FIT/COLOGUARD RESULT [  ] LAST DEXA  [  ] LAST MAMMOGRAM  [  ] LAST PAP  [  ] LAST LABS [  ] RETINA EXAM REPORT  [  ] IMMUNIZATION RECORDS  [  ] Release all info      [  ] Check here and initial the line next to each item to release ALL health information INCLUDING  _____ Care and treatment for drug and / or alcohol abuse  _____ HIV testing, infection status, or AIDS  _____ Genetic Testing    DATES OF SERVICE OR TIME PERIOD TO BE DISCLOSED: _____________  I understand and acknowledge that:  * This Authorization may be revoked at any time by you in writing, except if your health information has already been used or disclosed.  * Your health information that will be used or disclosed as a result of you signing this authorization could be re-disclosed by the recipient. If this occurs, your re-disclosed health information may no longer be protected by State or Federal laws.  * You may refuse to sign this Authorization. Your refusal will not affect your ability to obtain treatment.  * This Authorization becomes effective upon signing and will  on (date) __________.      If no date is indicated, this Authorization will  one (1) year from the signature date.    Name: Hermelinda Mosley  Signature: Date:   2025     PLEASE FAX REQUESTED RECORDS  BACK TO: [phone]

## 2025-05-16 NOTE — PROGRESS NOTES
"Subjective:   Hermelinda Mosley is a 74 y.o. female here today for evaluation and management of:     Dyslipidemia  Myalgia with statins  Rx for zetia provided.   Follow up lipid panel ordered  Continue healthy diet rich in fiber, vegetables, low in saturated fats    Elevated hemoglobin (HCC)  Mild elevated hb, [place], does not smoke.   Has alpha 1 antitripsin def and had pulm testing by pulmonology,   Mild elevated h/h stable for many years.   On xarelto for hx of PE has factor V leiden mutation         She had benign emb done by gyn, records requested.       Current medicines (including changes today)  Current Medications[1]  She  has a past medical history of Abdominal pain, unspecified site, Alpha 1-antitrypsin PiMS phenotype, Back pain, Bronchitis, Chickenpox, Degeneration of lumbar or lumbosacral intervertebral disc, Diarrhea, DVT (deep venous thrombosis) (HCC), Esophageal reflux, Syriac measles, Hernia of unspecified site of abdominal cavity without mention of obstruction or gangrene (repaired), History of colonoscopy (2005=normal), Influenza, Irritable bowel syndrome, Mumps, Pap smear (due), Pneumonia, Recurrent UTI (9/4/2009), Restless leg syndrome, Screening mammogram (10/30/2008=normal), Superficial thrombophlebitis of left leg (10/20/2017), Swelling (9/4/2009), Tonsillitis, Unspecified hemorrhoids without mention of complication, and Vein, varicose (stripped).    She has no past medical history of Encounter for long-term (current) use of other medications.    ROS  No chest pain, no shortness of breath, no abdominal pain       Objective:     /78 (BP Location: Right arm, Patient Position: Sitting, BP Cuff Size: Adult)   Pulse 73   Temp 36.3 °C (97.4 °F) (Temporal)   Resp 20   Ht 1.575 m (5' 2\")   Wt 72.6 kg (160 lb)   SpO2 98%  Body mass index is 29.26 kg/m².   Physical Exam:  Constitutional: Alert, no distress.  Skin: Warm, dry, good turgor, no rashes in visible " areas.  Eye: Equal, round and reactive, conjunctiva clear, lids normal.  ENMT: Lips without lesions, good dentition, oropharynx clear.  Neck: Trachea midline, no masses, no thyromegaly. No cervical or supraclavicular lymphadenopathy  Respiratory: Unlabored respiratory effort, lungs clear to auscultation, no wheezes, no ronchi.  Cardiovascular: Normal S1, S2, no murmur, no edema.  Abdomen: Soft, non-tender, no masses, no hepatosplenomegaly.  Psych: Alert and oriented x3, normal affect and mood.    Assessment and Plan:   The following treatment plan was discussed    1. Thickened endometrium    2. Long term (current) use of anticoagulants    3. Chronic kidney disease, stage 3a  - Comp Metabolic Panel; Future    4. Abnormal CBC  - CBC WITH DIFFERENTIAL; Future    5. Vitamin D deficiency  - VITAMIN D,25 HYDROXY (DEFICIENCY); Future    6. Dyslipidemia  - Lipid Profile; Future    7. Elevated fasting glucose    8. Elevated hemoglobin (HCC)    Other orders  - Obtain Results: Other (see comment) (consult notes, emb biopsy results); Obtain Results From: Other (see comment)  - methocarbamol (ROBAXIN) 500 MG Tab; Take 1 Tablet by mouth 3 times a day.  Dispense: 90 Tablet; Refill: 3  - ezetimibe (ZETIA) 10 MG Tab; Take 1 Tablet by mouth every day.  Dispense: 90 Tablet; Refill: 3      Followup: Return in about 6 months (around 11/16/2025) for Lab Review.                [1]   Current Outpatient Medications   Medication Sig Dispense Refill    methocarbamol (ROBAXIN) 500 MG Tab Take 1 Tablet by mouth 3 times a day. 90 Tablet 3    ezetimibe (ZETIA) 10 MG Tab Take 1 Tablet by mouth every day. 90 Tablet 3    rivaroxaban (XARELTO) 10 MG Tab tablet Take 1 Tablet by mouth with dinner. 90 Tablet 3    omeprazole (PRILOSEC) 20 MG delayed-release capsule Take 1 Capsule by mouth 1 time a day as needed (acid reflux). 90 Capsule 3    cyanocobalamin (VITAMIN B-12) 500 MCG Tab Take 500 mcg by mouth every day.      magnesium oxide (MAG-OX) 400 MG  Tab tablet Take 250 mg by mouth 1 time a day as needed (for leg cramps). Leg Cramps      lidocaine (LIDODERM) 5 % Patch Place 1 Patch on the skin every 24 hours. 10 Patch 0    diclofenac sodium (VOLTAREN) 1 % Gel Apply 2 g topically 1 time a day as needed (pain). 50 g 0    CALCIUM PO Take 1 Tablet by mouth every day.      Multiple Vitamins-Minerals (MULTIVITAMIN PO) Take 1 Tablet by mouth every day.      Cholecalciferol (VITAMIN D PO) Take  by mouth.       No current facility-administered medications for this visit.

## 2025-05-16 NOTE — ASSESSMENT & PLAN NOTE
Mild elevated hb, lives [place], does not smoke.   Has alpha 1 antitripsin def and had pulm testing by pulmonology,   Mild elevated h/h stable for many years.   On xarelto for hx of PE has factor V leiden mutation

## 2025-05-16 NOTE — LETTER
[facility]  Maribel Long M.D.  [address] [place]  [place] [address]  Fax: [phone]   Authorization for Release/Disclosure of   Protected Health Information   Name: HERMELINDA MOSLEY : 1950 SSN: [id number]   Address: [address]  [place] [address] Phone:    [phone] (home)    I authorize the entity listed below to release/disclose the PHI below to:   [facility]/Maribel Long M.D. and Maribel Long M.D.   Provider or Entity Name:     Address   City, State, Zip   Phone:      Fax:     Reason for request: continuity of care   Information to be released:    [  ] LAST COLONOSCOPY,  including any PATH REPORT and follow-up  [  ] LAST FIT/COLOGUARD RESULT [  ] LAST DEXA  [  ] LAST MAMMOGRAM  [  ] LAST PAP  [  ] LAST LABS [  ] RETINA EXAM REPORT  [  ] IMMUNIZATION RECORDS  [  ] Release all info      [  ] Check here and initial the line next to each item to release ALL health information INCLUDING  _____ Care and treatment for drug and / or alcohol abuse  _____ HIV testing, infection status, or AIDS  _____ Genetic Testing    DATES OF SERVICE OR TIME PERIOD TO BE DISCLOSED: _____________  I understand and acknowledge that:  * This Authorization may be revoked at any time by you in writing, except if your health information has already been used or disclosed.  * Your health information that will be used or disclosed as a result of you signing this authorization could be re-disclosed by the recipient. If this occurs, your re-disclosed health information may no longer be protected by State or Federal laws.  * You may refuse to sign this Authorization. Your refusal will not affect your ability to obtain treatment.  * This Authorization becomes effective upon signing and will  on (date) __________.      If no date is indicated, this Authorization will  one (1) year from the signature date.    Name: Hermelinda Mosley  Signature: Date:   2025     PLEASE FAX REQUESTED RECORDS  BACK TO: [phone]

## 2025-05-16 NOTE — ASSESSMENT & PLAN NOTE
Myalgia with statins  Rx for zetia provided.   Follow up lipid panel ordered  Continue healthy diet rich in fiber, vegetables, low in saturated fats

## 2025-07-08 ENCOUNTER — NON-PROVIDER VISIT (OUTPATIENT)
Dept: INTERNAL MEDICINE | Facility: OTHER | Age: 75
End: 2025-07-08
Payer: MEDICARE

## 2025-07-08 DIAGNOSIS — K31.84 GASTROPARESIS: Primary | ICD-10-CM

## 2025-07-08 NOTE — PATIENT INSTRUCTIONS
Goals:  Work on adding in benefiber to your morning - start with 1 tsp in the morning for 5-7 days then go up to 2 tsp for 5-7 days and then work up to 3-4 tsp   Try the lactase enzyme at most pharmacies/grocery stores to take with yogurt   Keep trying foods to see what you tolerate and see what works for you

## 2025-07-08 NOTE — PROGRESS NOTES
Hermelinda is here for FU with RD for gastroparesis per referral from July 2024 with diagnosis code of K31.84     Reports that she is feeling good   Has not been completely following the gastroapresis diet - feels that she can tolerate certain foods that her on the do not eat list for gastroparesis,  But trying to stay away from skin of foods   Monitoring her progress and paying attention to what she does and does not tolerate  No nausea/vomiting     Diarrhea occ once in a while - still drinking coffee but sticking to decaf   Notices pain in her lower right side - pain increases with nuts or after eating certain foods   Still doing miralax some days but not every day - notices that her BMs are good - some times diarrhea but some days she can feel constipation   Eating cooked veggies     Worked with Hermelinda today in understanding that the nutrition approach for gastroparesis is very individualized and that if she find she is tolerating certain foods that are on the do not eat list, that it is ok to allow them but in small portions and to monitor her symptoms. She was very concerned about her bowel movements and not being able to completely empty so we talked about how her gastroparesis diet is quite low in fiber and that we can try to slowly add in more fiber to her diet (especially soluble fiber) to see if that helps.   Set goals; rtc with RD in 6 months    Time spent: 30 minutes

## 2025-07-09 VITALS — BODY MASS INDEX: 29.26 KG/M2 | WEIGHT: 160 LBS

## 2025-07-09 ASSESSMENT — FIBROSIS 4 INDEX: FIB4 SCORE: 1.66
